# Patient Record
Sex: MALE | Race: WHITE | NOT HISPANIC OR LATINO | URBAN - METROPOLITAN AREA
[De-identification: names, ages, dates, MRNs, and addresses within clinical notes are randomized per-mention and may not be internally consistent; named-entity substitution may affect disease eponyms.]

---

## 2019-02-19 ENCOUNTER — OUTPATIENT (OUTPATIENT)
Dept: OUTPATIENT SERVICES | Facility: HOSPITAL | Age: 74
LOS: 1 days | End: 2019-02-19
Payer: MEDICARE

## 2019-02-19 VITALS
HEIGHT: 67 IN | SYSTOLIC BLOOD PRESSURE: 110 MMHG | WEIGHT: 223.11 LBS | HEART RATE: 54 BPM | OXYGEN SATURATION: 99 % | TEMPERATURE: 98 F | RESPIRATION RATE: 14 BRPM | DIASTOLIC BLOOD PRESSURE: 67 MMHG

## 2019-02-19 DIAGNOSIS — Z98.61 CORONARY ANGIOPLASTY STATUS: Chronic | ICD-10-CM

## 2019-02-19 DIAGNOSIS — Z01.818 ENCOUNTER FOR OTHER PREPROCEDURAL EXAMINATION: ICD-10-CM

## 2019-02-19 DIAGNOSIS — M17.5 OTHER UNILATERAL SECONDARY OSTEOARTHRITIS OF KNEE: ICD-10-CM

## 2019-02-19 DIAGNOSIS — M17.12 UNILATERAL PRIMARY OSTEOARTHRITIS, LEFT KNEE: ICD-10-CM

## 2019-02-19 DIAGNOSIS — Z98.890 OTHER SPECIFIED POSTPROCEDURAL STATES: Chronic | ICD-10-CM

## 2019-02-19 LAB
ALBUMIN SERPL ELPH-MCNC: 3.9 G/DL — SIGNIFICANT CHANGE UP (ref 3.3–5)
ALP SERPL-CCNC: 75 U/L — SIGNIFICANT CHANGE UP (ref 30–120)
ALT FLD-CCNC: 28 U/L DA — SIGNIFICANT CHANGE UP (ref 10–60)
ANION GAP SERPL CALC-SCNC: 5 MMOL/L — SIGNIFICANT CHANGE UP (ref 5–17)
APTT BLD: 33.5 SEC — SIGNIFICANT CHANGE UP (ref 28.5–37)
AST SERPL-CCNC: 23 U/L — SIGNIFICANT CHANGE UP (ref 10–40)
BILIRUB SERPL-MCNC: 0.5 MG/DL — SIGNIFICANT CHANGE UP (ref 0.2–1.2)
BLD GP AB SCN SERPL QL: SIGNIFICANT CHANGE UP
BUN SERPL-MCNC: 19 MG/DL — SIGNIFICANT CHANGE UP (ref 7–23)
CALCIUM SERPL-MCNC: 8.9 MG/DL — SIGNIFICANT CHANGE UP (ref 8.4–10.5)
CHLORIDE SERPL-SCNC: 105 MMOL/L — SIGNIFICANT CHANGE UP (ref 96–108)
CO2 SERPL-SCNC: 34 MMOL/L — HIGH (ref 22–31)
CREAT SERPL-MCNC: 1.06 MG/DL — SIGNIFICANT CHANGE UP (ref 0.5–1.3)
GLUCOSE SERPL-MCNC: 130 MG/DL — HIGH (ref 70–99)
HCT VFR BLD CALC: 44.2 % — SIGNIFICANT CHANGE UP (ref 39–50)
HGB BLD-MCNC: 14.5 G/DL — SIGNIFICANT CHANGE UP (ref 13–17)
INR BLD: 1.02 RATIO — SIGNIFICANT CHANGE UP (ref 0.88–1.16)
MCHC RBC-ENTMCNC: 30 PG — SIGNIFICANT CHANGE UP (ref 27–34)
MCHC RBC-ENTMCNC: 32.8 GM/DL — SIGNIFICANT CHANGE UP (ref 32–36)
MCV RBC AUTO: 91.5 FL — SIGNIFICANT CHANGE UP (ref 80–100)
NRBC # BLD: 0 /100 WBCS — SIGNIFICANT CHANGE UP (ref 0–0)
PLATELET # BLD AUTO: 154 K/UL — SIGNIFICANT CHANGE UP (ref 150–400)
POTASSIUM SERPL-MCNC: 4.1 MMOL/L — SIGNIFICANT CHANGE UP (ref 3.5–5.3)
POTASSIUM SERPL-SCNC: 4.1 MMOL/L — SIGNIFICANT CHANGE UP (ref 3.5–5.3)
PROT SERPL-MCNC: 6.9 G/DL — SIGNIFICANT CHANGE UP (ref 6–8.3)
PROTHROM AB SERPL-ACNC: 11.1 SEC — SIGNIFICANT CHANGE UP (ref 10–12.9)
RBC # BLD: 4.83 M/UL — SIGNIFICANT CHANGE UP (ref 4.2–5.8)
RBC # FLD: 13.4 % — SIGNIFICANT CHANGE UP (ref 10.3–14.5)
SODIUM SERPL-SCNC: 144 MMOL/L — SIGNIFICANT CHANGE UP (ref 135–145)
WBC # BLD: 5.34 K/UL — SIGNIFICANT CHANGE UP (ref 3.8–10.5)
WBC # FLD AUTO: 5.34 K/UL — SIGNIFICANT CHANGE UP (ref 3.8–10.5)

## 2019-02-19 PROCEDURE — 87640 STAPH A DNA AMP PROBE: CPT

## 2019-02-19 PROCEDURE — 85610 PROTHROMBIN TIME: CPT

## 2019-02-19 PROCEDURE — 85027 COMPLETE CBC AUTOMATED: CPT

## 2019-02-19 PROCEDURE — 86900 BLOOD TYPING SEROLOGIC ABO: CPT

## 2019-02-19 PROCEDURE — 86850 RBC ANTIBODY SCREEN: CPT

## 2019-02-19 PROCEDURE — 93005 ELECTROCARDIOGRAM TRACING: CPT

## 2019-02-19 PROCEDURE — 36415 COLL VENOUS BLD VENIPUNCTURE: CPT

## 2019-02-19 PROCEDURE — 80053 COMPREHEN METABOLIC PANEL: CPT

## 2019-02-19 PROCEDURE — 87641 MR-STAPH DNA AMP PROBE: CPT

## 2019-02-19 PROCEDURE — G0463: CPT

## 2019-02-19 PROCEDURE — 85730 THROMBOPLASTIN TIME PARTIAL: CPT

## 2019-02-19 PROCEDURE — 93010 ELECTROCARDIOGRAM REPORT: CPT

## 2019-02-19 PROCEDURE — 86901 BLOOD TYPING SEROLOGIC RH(D): CPT

## 2019-02-19 RX ORDER — SILODOSIN 4 MG/1
1 CAPSULE ORAL
Qty: 0 | Refills: 0 | COMMUNITY

## 2019-02-19 RX ORDER — MEMANTINE HYDROCHLORIDE 10 MG/1
0 TABLET ORAL
Qty: 0 | Refills: 0 | COMMUNITY

## 2019-02-19 NOTE — H&P PST ADULT - PROBLEM SELECTOR PLAN 1
Left knee replacement   Medical and cardiac clearance   Plavix and aspirin instruction from cardiologist  Pre op instruction   Respiratory and pharmacy consult

## 2019-02-19 NOTE — H&P PST ADULT - PMH
BPH (benign prostatic hyperplasia)    CAD (coronary artery disease)    Hypothyroid    Memory loss    Myocardial infarction  2002,2006  JILLIAN on CPAP    Other secondary osteoarthritis of left knee BPH (benign prostatic hyperplasia)    CAD (coronary artery disease)    Essential hypertension    HLD (hyperlipidemia)    Hypothyroid    Memory loss    Myocardial infarction  2002,2006  JILLIAN on CPAP    Other secondary osteoarthritis of left knee

## 2019-02-19 NOTE — H&P PST ADULT - HISTORY OF PRESENT ILLNESS
Kyra is a 72 y/o male who presents with 2 year history of left knee pain . Reports pain with movement and bending knee . Takes Tylenol PRN for pain . scheduled for left knee replacement on 5/4/19 Kyra is a 74 y/o male who presents with 2 year history of left knee pain . Reports pain with movement and bending knee . Takes Tylenol PRN for pain . scheduled for left knee replacement on 3/4/19

## 2019-02-19 NOTE — H&P PST ADULT - PSH
Coronary angioplasty status  PCI with stents 2002 LAD,  2006 x 1  S/P arthroscopy of left knee  2014

## 2019-02-20 LAB
MRSA PCR RESULT.: SIGNIFICANT CHANGE UP
S AUREUS DNA NOSE QL NAA+PROBE: SIGNIFICANT CHANGE UP

## 2019-03-01 RX ORDER — ONDANSETRON 8 MG/1
4 TABLET, FILM COATED ORAL EVERY 6 HOURS
Qty: 0 | Refills: 0 | Status: DISCONTINUED | OUTPATIENT
Start: 2019-03-04 | End: 2019-03-06

## 2019-03-01 RX ORDER — SENNA PLUS 8.6 MG/1
2 TABLET ORAL AT BEDTIME
Qty: 0 | Refills: 0 | Status: DISCONTINUED | OUTPATIENT
Start: 2019-03-04 | End: 2019-03-06

## 2019-03-01 RX ORDER — DOCUSATE SODIUM 100 MG
100 CAPSULE ORAL THREE TIMES A DAY
Qty: 0 | Refills: 0 | Status: DISCONTINUED | OUTPATIENT
Start: 2019-03-04 | End: 2019-03-06

## 2019-03-01 RX ORDER — PANTOPRAZOLE SODIUM 20 MG/1
40 TABLET, DELAYED RELEASE ORAL
Qty: 0 | Refills: 0 | Status: DISCONTINUED | OUTPATIENT
Start: 2019-03-04 | End: 2019-03-06

## 2019-03-01 RX ORDER — POLYETHYLENE GLYCOL 3350 17 G/17G
17 POWDER, FOR SOLUTION ORAL DAILY
Qty: 0 | Refills: 0 | Status: DISCONTINUED | OUTPATIENT
Start: 2019-03-04 | End: 2019-03-06

## 2019-03-01 RX ORDER — SODIUM CHLORIDE 9 MG/ML
1000 INJECTION, SOLUTION INTRAVENOUS
Qty: 0 | Refills: 0 | Status: DISCONTINUED | OUTPATIENT
Start: 2019-03-04 | End: 2019-03-06

## 2019-03-01 RX ORDER — MAGNESIUM HYDROXIDE 400 MG/1
30 TABLET, CHEWABLE ORAL DAILY
Qty: 0 | Refills: 0 | Status: DISCONTINUED | OUTPATIENT
Start: 2019-03-04 | End: 2019-03-06

## 2019-03-01 NOTE — PHARMACOTHERAPY INTERVENTION NOTE - COMMENTS
Presurgical evaluation for postoperative medication management. Team emailed. Note placed in [place].

## 2019-03-01 NOTE — PROGRESS NOTE ADULT - ASSESSMENT
Presurgical evaluation:  1.	Topical TXA  2.	Acetaminophen for pain management. History of MI x 2 – add Celecoxib only if necessary for augmented pain management  3.	Therapeutic interchange for Tolterodine ER 4mg Qday is Oxybutynin 10mg BID (enter Tolterodine ER 4mg and page down to Therapeutic Interchange). No therapeutic interchange for Silodosin.  4.	VTE prophylaxis (Caprini 8): ASA EC 81mg q12h x 6 weeks, then resume ASA 81mg Qday. Resume Clopidogrel 75mg Qday POD1.

## 2019-03-03 ENCOUNTER — INPATIENT (INPATIENT)
Facility: HOSPITAL | Age: 74
LOS: 2 days | Discharge: NURSING, REHAB OR LONG-TERM CARE | DRG: 470 | End: 2019-03-06
Attending: ORTHOPAEDIC SURGERY | Admitting: ORTHOPAEDIC SURGERY
Payer: MEDICARE

## 2019-03-03 VITALS
OXYGEN SATURATION: 95 % | SYSTOLIC BLOOD PRESSURE: 148 MMHG | TEMPERATURE: 98 F | DIASTOLIC BLOOD PRESSURE: 80 MMHG | RESPIRATION RATE: 14 BRPM | HEART RATE: 55 BPM

## 2019-03-03 DIAGNOSIS — Z98.890 OTHER SPECIFIED POSTPROCEDURAL STATES: Chronic | ICD-10-CM

## 2019-03-03 DIAGNOSIS — Z98.61 CORONARY ANGIOPLASTY STATUS: Chronic | ICD-10-CM

## 2019-03-03 DIAGNOSIS — M17.12 UNILATERAL PRIMARY OSTEOARTHRITIS, LEFT KNEE: ICD-10-CM

## 2019-03-03 RX ORDER — LEVOTHYROXINE SODIUM 125 MCG
25 TABLET ORAL DAILY
Qty: 0 | Refills: 0 | Status: DISCONTINUED | OUTPATIENT
Start: 2019-03-03 | End: 2019-03-04

## 2019-03-03 RX ORDER — FAMOTIDINE 10 MG/ML
20 INJECTION INTRAVENOUS
Qty: 0 | Refills: 0 | Status: DISCONTINUED | OUTPATIENT
Start: 2019-03-03 | End: 2019-03-04

## 2019-03-03 RX ADMIN — FAMOTIDINE 20 MILLIGRAM(S): 10 INJECTION INTRAVENOUS at 21:40

## 2019-03-03 NOTE — CHART NOTE - NSCHARTNOTEFT_GEN_A_CORE
Patient admitted for pending surgery tomorrow.  Saw patient and patient with no acute complaints and is doing well.  Vital signs stable.  Will be followed by Dr. Schaefer.

## 2019-03-03 NOTE — PROVIDER CONTACT NOTE (OTHER) - BACKGROUND
Pre-op pt pending left total knee replacement tomorrow, DOS 3/4.  Pt states hx of JILLIAN; CPAP compliant, with setting of 4cm water pressure.  Pulmonary consult requested.

## 2019-03-03 NOTE — PROVIDER CONTACT NOTE (OTHER) - ACTION/TREATMENT ORDERED:
Discussed with Dr. Alexandra.  Telephone order taken for remote tele and CPAP machine.  Respiratory made aware.

## 2019-03-04 DIAGNOSIS — I25.10 ATHEROSCLEROTIC HEART DISEASE OF NATIVE CORONARY ARTERY WITHOUT ANGINA PECTORIS: ICD-10-CM

## 2019-03-04 DIAGNOSIS — M17.12 UNILATERAL PRIMARY OSTEOARTHRITIS, LEFT KNEE: ICD-10-CM

## 2019-03-04 DIAGNOSIS — E03.9 HYPOTHYROIDISM, UNSPECIFIED: ICD-10-CM

## 2019-03-04 DIAGNOSIS — I10 ESSENTIAL (PRIMARY) HYPERTENSION: ICD-10-CM

## 2019-03-04 LAB
ANION GAP SERPL CALC-SCNC: 7 MMOL/L — SIGNIFICANT CHANGE UP (ref 5–17)
BUN SERPL-MCNC: 18 MG/DL — SIGNIFICANT CHANGE UP (ref 7–23)
CALCIUM SERPL-MCNC: 8.6 MG/DL — SIGNIFICANT CHANGE UP (ref 8.4–10.5)
CHLORIDE SERPL-SCNC: 101 MMOL/L — SIGNIFICANT CHANGE UP (ref 96–108)
CO2 SERPL-SCNC: 26 MMOL/L — SIGNIFICANT CHANGE UP (ref 22–31)
CREAT SERPL-MCNC: 1.16 MG/DL — SIGNIFICANT CHANGE UP (ref 0.5–1.3)
GLUCOSE SERPL-MCNC: 193 MG/DL — HIGH (ref 70–99)
HCT VFR BLD CALC: 39.9 % — SIGNIFICANT CHANGE UP (ref 39–50)
HGB BLD-MCNC: 13.5 G/DL — SIGNIFICANT CHANGE UP (ref 13–17)
MCHC RBC-ENTMCNC: 29.9 PG — SIGNIFICANT CHANGE UP (ref 27–34)
MCHC RBC-ENTMCNC: 33.8 GM/DL — SIGNIFICANT CHANGE UP (ref 32–36)
MCV RBC AUTO: 88.5 FL — SIGNIFICANT CHANGE UP (ref 80–100)
NRBC # BLD: 0 /100 WBCS — SIGNIFICANT CHANGE UP (ref 0–0)
PLATELET # BLD AUTO: 134 K/UL — LOW (ref 150–400)
POTASSIUM SERPL-MCNC: 4.1 MMOL/L — SIGNIFICANT CHANGE UP (ref 3.5–5.3)
POTASSIUM SERPL-SCNC: 4.1 MMOL/L — SIGNIFICANT CHANGE UP (ref 3.5–5.3)
RBC # BLD: 4.51 M/UL — SIGNIFICANT CHANGE UP (ref 4.2–5.8)
RBC # FLD: 12.9 % — SIGNIFICANT CHANGE UP (ref 10.3–14.5)
SODIUM SERPL-SCNC: 134 MMOL/L — LOW (ref 135–145)
WBC # BLD: 10.47 K/UL — SIGNIFICANT CHANGE UP (ref 3.8–10.5)
WBC # FLD AUTO: 10.47 K/UL — SIGNIFICANT CHANGE UP (ref 3.8–10.5)

## 2019-03-04 PROCEDURE — 88305 TISSUE EXAM BY PATHOLOGIST: CPT | Mod: 26

## 2019-03-04 PROCEDURE — 88311 DECALCIFY TISSUE: CPT | Mod: 26

## 2019-03-04 PROCEDURE — 73562 X-RAY EXAM OF KNEE 3: CPT | Mod: 26,LT

## 2019-03-04 PROCEDURE — 27447 TOTAL KNEE ARTHROPLASTY: CPT | Mod: AS,LT

## 2019-03-04 RX ORDER — OXYCODONE HYDROCHLORIDE 5 MG/1
5 TABLET ORAL
Qty: 0 | Refills: 0 | Status: DISCONTINUED | OUTPATIENT
Start: 2019-03-04 | End: 2019-03-06

## 2019-03-04 RX ORDER — TAMSULOSIN HYDROCHLORIDE 0.4 MG/1
0.4 CAPSULE ORAL AT BEDTIME
Qty: 0 | Refills: 0 | Status: DISCONTINUED | OUTPATIENT
Start: 2019-03-04 | End: 2019-03-06

## 2019-03-04 RX ORDER — OXYCODONE HYDROCHLORIDE 5 MG/1
10 TABLET ORAL
Qty: 0 | Refills: 0 | Status: DISCONTINUED | OUTPATIENT
Start: 2019-03-04 | End: 2019-03-06

## 2019-03-04 RX ORDER — LEVOTHYROXINE SODIUM 125 MCG
75 TABLET ORAL DAILY
Qty: 0 | Refills: 0 | Status: DISCONTINUED | OUTPATIENT
Start: 2019-03-04 | End: 2019-03-06

## 2019-03-04 RX ORDER — APREPITANT 80 MG/1
40 CAPSULE ORAL ONCE
Qty: 0 | Refills: 0 | Status: COMPLETED | OUTPATIENT
Start: 2019-03-04 | End: 2019-03-04

## 2019-03-04 RX ORDER — ACETAMINOPHEN 500 MG
1000 TABLET ORAL EVERY 6 HOURS
Qty: 0 | Refills: 0 | Status: COMPLETED | OUTPATIENT
Start: 2019-03-04 | End: 2019-03-05

## 2019-03-04 RX ORDER — TRANEXAMIC ACID 100 MG/ML
1 INJECTION, SOLUTION INTRAVENOUS ONCE
Qty: 0 | Refills: 0 | Status: DISCONTINUED | OUTPATIENT
Start: 2019-03-04 | End: 2019-03-04

## 2019-03-04 RX ORDER — ATENOLOL 25 MG/1
25 TABLET ORAL DAILY
Qty: 0 | Refills: 0 | Status: DISCONTINUED | OUTPATIENT
Start: 2019-03-04 | End: 2019-03-06

## 2019-03-04 RX ORDER — LEVOTHYROXINE SODIUM 125 MCG
50 TABLET ORAL DAILY
Qty: 0 | Refills: 0 | Status: DISCONTINUED | OUTPATIENT
Start: 2019-03-04 | End: 2019-03-04

## 2019-03-04 RX ORDER — VANCOMYCIN HCL 1 G
1500 VIAL (EA) INTRAVENOUS ONCE
Qty: 0 | Refills: 0 | Status: COMPLETED | OUTPATIENT
Start: 2019-03-04 | End: 2019-03-04

## 2019-03-04 RX ORDER — OXYBUTYNIN CHLORIDE 5 MG
10 TABLET ORAL
Qty: 0 | Refills: 0 | Status: DISCONTINUED | OUTPATIENT
Start: 2019-03-04 | End: 2019-03-06

## 2019-03-04 RX ORDER — ACETAMINOPHEN 500 MG
1000 TABLET ORAL ONCE
Qty: 0 | Refills: 0 | Status: COMPLETED | OUTPATIENT
Start: 2019-03-04 | End: 2019-03-04

## 2019-03-04 RX ORDER — ASPIRIN/CALCIUM CARB/MAGNESIUM 324 MG
81 TABLET ORAL
Qty: 0 | Refills: 0 | Status: DISCONTINUED | OUTPATIENT
Start: 2019-03-04 | End: 2019-03-06

## 2019-03-04 RX ORDER — CLOPIDOGREL BISULFATE 75 MG/1
75 TABLET, FILM COATED ORAL DAILY
Qty: 0 | Refills: 0 | Status: DISCONTINUED | OUTPATIENT
Start: 2019-03-05 | End: 2019-03-06

## 2019-03-04 RX ORDER — HYDROMORPHONE HYDROCHLORIDE 2 MG/ML
0.5 INJECTION INTRAMUSCULAR; INTRAVENOUS; SUBCUTANEOUS
Qty: 0 | Refills: 0 | Status: DISCONTINUED | OUTPATIENT
Start: 2019-03-04 | End: 2019-03-04

## 2019-03-04 RX ORDER — CHLORHEXIDINE GLUCONATE 213 G/1000ML
1 SOLUTION TOPICAL ONCE
Qty: 0 | Refills: 0 | Status: COMPLETED | OUTPATIENT
Start: 2019-03-04 | End: 2019-03-04

## 2019-03-04 RX ORDER — LEVOTHYROXINE SODIUM 125 MCG
75 TABLET ORAL DAILY
Qty: 0 | Refills: 0 | Status: DISCONTINUED | OUTPATIENT
Start: 2019-03-04 | End: 2019-03-04

## 2019-03-04 RX ORDER — ATORVASTATIN CALCIUM 80 MG/1
80 TABLET, FILM COATED ORAL AT BEDTIME
Qty: 0 | Refills: 0 | Status: DISCONTINUED | OUTPATIENT
Start: 2019-03-04 | End: 2019-03-06

## 2019-03-04 RX ORDER — DONEPEZIL HYDROCHLORIDE 10 MG/1
10 TABLET, FILM COATED ORAL AT BEDTIME
Qty: 0 | Refills: 0 | Status: DISCONTINUED | OUTPATIENT
Start: 2019-03-04 | End: 2019-03-06

## 2019-03-04 RX ORDER — HYDROMORPHONE HYDROCHLORIDE 2 MG/ML
0.5 INJECTION INTRAMUSCULAR; INTRAVENOUS; SUBCUTANEOUS
Qty: 0 | Refills: 0 | Status: DISCONTINUED | OUTPATIENT
Start: 2019-03-04 | End: 2019-03-06

## 2019-03-04 RX ORDER — SODIUM CHLORIDE 9 MG/ML
1000 INJECTION, SOLUTION INTRAVENOUS
Qty: 0 | Refills: 0 | Status: DISCONTINUED | OUTPATIENT
Start: 2019-03-04 | End: 2019-03-04

## 2019-03-04 RX ORDER — MEMANTINE HYDROCHLORIDE 10 MG/1
10 TABLET ORAL
Qty: 0 | Refills: 0 | Status: DISCONTINUED | OUTPATIENT
Start: 2019-03-04 | End: 2019-03-06

## 2019-03-04 RX ORDER — ACETAMINOPHEN 500 MG
1000 TABLET ORAL EVERY 8 HOURS
Qty: 0 | Refills: 0 | Status: DISCONTINUED | OUTPATIENT
Start: 2019-03-05 | End: 2019-03-06

## 2019-03-04 RX ADMIN — Medication 300 MILLIGRAM(S): at 06:52

## 2019-03-04 RX ADMIN — APREPITANT 40 MILLIGRAM(S): 80 CAPSULE ORAL at 06:53

## 2019-03-04 RX ADMIN — Medication 10 MILLIGRAM(S): at 17:35

## 2019-03-04 RX ADMIN — Medication 1000 MILLIGRAM(S): at 21:28

## 2019-03-04 RX ADMIN — TAMSULOSIN HYDROCHLORIDE 0.4 MILLIGRAM(S): 0.4 CAPSULE ORAL at 21:27

## 2019-03-04 RX ADMIN — MEMANTINE HYDROCHLORIDE 10 MILLIGRAM(S): 10 TABLET ORAL at 17:35

## 2019-03-04 RX ADMIN — Medication 25 MICROGRAM(S): at 05:02

## 2019-03-04 RX ADMIN — FAMOTIDINE 20 MILLIGRAM(S): 10 INJECTION INTRAVENOUS at 05:02

## 2019-03-04 RX ADMIN — Medication 50 MICROGRAM(S): at 05:17

## 2019-03-04 RX ADMIN — Medication 1000 MILLIGRAM(S): at 16:15

## 2019-03-04 RX ADMIN — SODIUM CHLORIDE 125 MILLILITER(S): 9 INJECTION, SOLUTION INTRAVENOUS at 21:26

## 2019-03-04 RX ADMIN — Medication 300 MILLIGRAM(S): at 19:47

## 2019-03-04 RX ADMIN — CHLORHEXIDINE GLUCONATE 1 APPLICATION(S): 213 SOLUTION TOPICAL at 06:53

## 2019-03-04 RX ADMIN — ATORVASTATIN CALCIUM 80 MILLIGRAM(S): 80 TABLET, FILM COATED ORAL at 21:27

## 2019-03-04 RX ADMIN — DONEPEZIL HYDROCHLORIDE 10 MILLIGRAM(S): 10 TABLET, FILM COATED ORAL at 21:28

## 2019-03-04 RX ADMIN — Medication 81 MILLIGRAM(S): at 17:35

## 2019-03-04 RX ADMIN — Medication 400 MILLIGRAM(S): at 21:25

## 2019-03-04 RX ADMIN — Medication 400 MILLIGRAM(S): at 15:31

## 2019-03-04 RX ADMIN — Medication 100 MILLIGRAM(S): at 21:27

## 2019-03-04 RX ADMIN — SENNA PLUS 2 TABLET(S): 8.6 TABLET ORAL at 21:26

## 2019-03-04 NOTE — DISCHARGE NOTE ADULT - MEDICATION SUMMARY - MEDICATIONS TO CHANGE
I will SWITCH the dose or number of times a day I take the medications listed below when I get home from the hospital:    aspirin 81 mg oral tablet  -- 2  by mouth once a day

## 2019-03-04 NOTE — PHYSICAL THERAPY INITIAL EVALUATION ADULT - RANGE OF MOTION EXAMINATION, REHAB EVAL
deficits as listed below/left knee flex ~60 degrees/bilateral upper extremity ROM was WFL (within functional limits)/Right LE ROM was WFL (within functional limits)

## 2019-03-04 NOTE — DISCHARGE NOTE ADULT - HOSPITAL COURSE
The patient is a 73 y.o.  male with severe osteoarthritis of the left knee.  he was seen in the PST department at [facility] nd obtained the appropriate medical clearances  He was admitted on 3/3/19 (pt boarded overnight due to inclement weather) On 3/4/19, he received pre-operative parenteral prophylactic antibiotics (vancomycin), and was brought to the operating room and underwent an  uncomplicated left TKA by orthopedic surgeon Dr. Eric Colunga.    A medical consultation from the Hospitalist service was obtained for post-operative medical co-management. Typical Physical & occupational therapy modalities post TKA were performed including ambulation training, range of motion, ADL's, and transfers. Ecotrin 81 mg every 12 hrs, along with bilateral venodynes, was given for DVT prophylaxis (caprini 8)  The patient had a clean appearing surgical incision with no sign of surgical site infections and had a stable neuro / vascular exam of the operated extremity.  After progression of mobility guided by the PT/ OT staff,  the patient was felt to benefit from further rehabilitative care for restoration to level of function. This was felt to best be accomplished in a rehab facility.  Discharge and Orthopedic Care instructions were delineated in the Discharge Plan and reviewed with the patient. All medications were delineated in the medication reconciliation tool and key points were reviewed with the patient. They were deemed stable from an Orthopedic & medical standpoint for discharge today.  Upon  discharge from the rehab facility they will be  following up with Dr. Colunga for office  follow up Orthopedic care.

## 2019-03-04 NOTE — DISCHARGE NOTE ADULT - INSTRUCTIONS
regular  For Constipation :   • Increase your water intake. Drink at least 8 glasses of water daily.  • Try adding fiber to your diet by eating fruits, vegetables and foods that are rich in grains.  • If you do experience constipation, you may take an over-the-counter stool softener/laxative such as Nathaly Colace, Senekot, miralax or  Milk of Magnesia.

## 2019-03-04 NOTE — BRIEF OPERATIVE NOTE - PROCEDURE
<<-----Click on this checkbox to enter Procedure Left total knee arthroplasty  03/04/2019    Active  LOGATA

## 2019-03-04 NOTE — DISCHARGE NOTE ADULT - MEDICATION SUMMARY - MEDICATIONS TO TAKE
I will START or STAY ON the medications listed below when I get home from the hospital:    acetaminophen 500 mg oral tablet  -- 2 tab(s) by mouth every 12 hours  -- Indication: For Pain    oxyCODONE 5 mg oral tablet  -- 1 tab(s) by mouth every 3 hours, As needed, Mild Pain (1 - 3)  -- Indication: For Pain    oxyCODONE 10 mg oral tablet  -- 1 tab(s) by mouth every 3 hours, As needed, Moderate Pain (4 - 6)  -- Indication: For Pain    aspirin 81 mg oral delayed release tablet  -- 1 tab(s) by mouth 2 times a day x 40 more days  -- Indication: For DVT prophylaxis    tamsulosin 0.4 mg oral capsule  -- 1 cap(s) by mouth once a day (at bedtime)  -- Indication: For BPH    Rapaflo 8 mg oral capsule  -- 1 cap(s) by mouth once a day (at bedtime)  -- Indication: For BPH    Crestor 20 mg oral tablet  -- 1 tab(s) by mouth once a day (at bedtime)  -- Indication: For Hyperlipidimia    Plavix 75 mg oral tablet  -- 1 tab(s) by mouth once a day  -- Indication: For CAD (coronary artery disease)    atenolol 25 mg oral tablet  -- 1 tab(s) by mouth once a day  -- Indication: For HTN    Aricept 10 mg oral tablet  -- 1 tab(s) by mouth once a day (at bedtime)  -- Indication: For dementia    docusate sodium 100 mg oral capsule  -- 1 cap(s) by mouth 3 times a day  -- Indication: For Constipation    senna oral tablet  -- 2 tab(s) by mouth once a day (at bedtime)  -- Indication: For Constipation    memantine 10 mg oral tablet  -- orally 2 times a day  -- Indication: For dementia    Fish Oil oral capsule  -- 1 tab(s) by mouth once a day  -- Indication: For supplem    pantoprazole 40 mg oral delayed release tablet  -- 1 tab(s) by mouth once a day (before a meal)  -- Indication: For Acid reflux    levothyroxine 75 mcg (0.075 mg) oral tablet  -- 1 tab(s) by mouth once a day  -- Indication: For Hypothyrodism    Detrol  -- 4 milligram(s) by mouth once a day  -- Indication: For Over active bladder    Multi Vitamin+  -- 1 tab(s) orally  -- Indication: For vitamin    Vitamin B12 50 mcg oral tablet  -- 1 tab(s) by mouth once a day  -- Indication: For vitamin

## 2019-03-04 NOTE — DISCHARGE NOTE ADULT - CARE PROVIDER_API CALL
Eric Colunga)  Orthopaedic Surgery  [address] [place] [address]  Phone: [phone]  Fax: [phone]  Follow Up Time:

## 2019-03-04 NOTE — DISCHARGE NOTE ADULT - MEDICATION SUMMARY - MEDICATIONS TO STOP TAKING
I will STOP taking the medications listed below when I get home from the hospital:    Ecotrin  -- 81 milligram(s) orally

## 2019-03-04 NOTE — CONSULT NOTE ADULT - ATTENDING COMMENTS
Patient seen and examined.   Full consult dictated.   Will follow.   Pulmonary consult called for JILLIAN.

## 2019-03-04 NOTE — DISCHARGE NOTE ADULT - PATIENT PORTAL LINK FT
You can access the [facility], offered by [facility], by registering with the following website: [email or web address]

## 2019-03-04 NOTE — OCCUPATIONAL THERAPY INITIAL EVALUATION ADULT - ADL RETRAINING, OT EVAL
Patient will dress lower body with set-up , AE as needed within 2-3 sessions.
Patient will dress lower body with set-up , AE as needed within 2-3 sessions.

## 2019-03-04 NOTE — DISCHARGE NOTE ADULT - PLAN OF CARE
improve ambulation, range of motion, quality of life, and decrease pain Physical Therapy/Occupational Therapy for ambulation, transfers, stairs, ADL's, Range of Motion Exercises, Isometrics.  Full weight bearing as tolerated with rolling walker  Range of Motion Goals: Flexion 120 degrees; Extension 0 degrees  Keep incision clean and dry.  Staple removal 14 days after surgery at rehab facility or Surgeon's office  May shower post-op day #5 if no drainage from incision - Call your doctor if you experience:  • An increase in pain not controlled by pain medication or change in activity or  position.  • Temperature greater than 101° F.  • Redness, increased swelling or foul smelling drainage from or around the  incision.  • Numbness, tingling or a change in color or temperature of the operative leg.  • Call your doctor immediately if you experience chest pain, shortness of breath or calf pain.

## 2019-03-04 NOTE — DISCHARGE NOTE ADULT - CARE PLAN
Principal Discharge DX:	Primary osteoarthritis of left knee  Goal:	improve ambulation, range of motion, quality of life, and decrease pain  Assessment and plan of treatment:	Physical Therapy/Occupational Therapy for ambulation, transfers, stairs, ADL's, Range of Motion Exercises, Isometrics.  Full weight bearing as tolerated with rolling walker  Range of Motion Goals: Flexion 120 degrees; Extension 0 degrees  Keep incision clean and dry.  Staple removal 14 days after surgery at rehab facility or Surgeon's office  May shower post-op day #5 if no drainage from incision  Assessment and plan of treatment:	- Call your doctor if you experience:  • An increase in pain not controlled by pain medication or change in activity or  position.  • Temperature greater than 101° F.  • Redness, increased swelling or foul smelling drainage from or around the  incision.  • Numbness, tingling or a change in color or temperature of the operative leg.  • Call your doctor immediately if you experience chest pain, shortness of breath or calf pain.

## 2019-03-05 DIAGNOSIS — E03.9 HYPOTHYROIDISM, UNSPECIFIED: ICD-10-CM

## 2019-03-05 DIAGNOSIS — G47.33 OBSTRUCTIVE SLEEP APNEA (ADULT) (PEDIATRIC): ICD-10-CM

## 2019-03-05 DIAGNOSIS — Z29.9 ENCOUNTER FOR PROPHYLACTIC MEASURES, UNSPECIFIED: ICD-10-CM

## 2019-03-05 DIAGNOSIS — D50.0 IRON DEFICIENCY ANEMIA SECONDARY TO BLOOD LOSS (CHRONIC): ICD-10-CM

## 2019-03-05 DIAGNOSIS — N40.0 BENIGN PROSTATIC HYPERPLASIA WITHOUT LOWER URINARY TRACT SYMPTOMS: ICD-10-CM

## 2019-03-05 LAB
ANION GAP SERPL CALC-SCNC: 5 MMOL/L — SIGNIFICANT CHANGE UP (ref 5–17)
BUN SERPL-MCNC: 17 MG/DL — SIGNIFICANT CHANGE UP (ref 7–23)
CALCIUM SERPL-MCNC: 8.5 MG/DL — SIGNIFICANT CHANGE UP (ref 8.4–10.5)
CHLORIDE SERPL-SCNC: 107 MMOL/L — SIGNIFICANT CHANGE UP (ref 96–108)
CO2 SERPL-SCNC: 28 MMOL/L — SIGNIFICANT CHANGE UP (ref 22–31)
CREAT SERPL-MCNC: 1.03 MG/DL — SIGNIFICANT CHANGE UP (ref 0.5–1.3)
GLUCOSE SERPL-MCNC: 149 MG/DL — HIGH (ref 70–99)
HCT VFR BLD CALC: 33.4 % — LOW (ref 39–50)
HGB BLD-MCNC: 11.5 G/DL — LOW (ref 13–17)
MCHC RBC-ENTMCNC: 30.3 PG — SIGNIFICANT CHANGE UP (ref 27–34)
MCHC RBC-ENTMCNC: 34.4 GM/DL — SIGNIFICANT CHANGE UP (ref 32–36)
MCV RBC AUTO: 88.1 FL — SIGNIFICANT CHANGE UP (ref 80–100)
NRBC # BLD: 0 /100 WBCS — SIGNIFICANT CHANGE UP (ref 0–0)
PLATELET # BLD AUTO: 129 K/UL — LOW (ref 150–400)
POTASSIUM SERPL-MCNC: 4.4 MMOL/L — SIGNIFICANT CHANGE UP (ref 3.5–5.3)
POTASSIUM SERPL-SCNC: 4.4 MMOL/L — SIGNIFICANT CHANGE UP (ref 3.5–5.3)
RBC # BLD: 3.79 M/UL — LOW (ref 4.2–5.8)
RBC # FLD: 13 % — SIGNIFICANT CHANGE UP (ref 10.3–14.5)
SODIUM SERPL-SCNC: 140 MMOL/L — SIGNIFICANT CHANGE UP (ref 135–145)
WBC # BLD: 10.75 K/UL — HIGH (ref 3.8–10.5)
WBC # FLD AUTO: 10.75 K/UL — HIGH (ref 3.8–10.5)

## 2019-03-05 PROCEDURE — 12345: CPT | Mod: NC

## 2019-03-05 RX ADMIN — DONEPEZIL HYDROCHLORIDE 10 MILLIGRAM(S): 10 TABLET, FILM COATED ORAL at 21:52

## 2019-03-05 RX ADMIN — Medication 81 MILLIGRAM(S): at 17:38

## 2019-03-05 RX ADMIN — Medication 1000 MILLIGRAM(S): at 17:40

## 2019-03-05 RX ADMIN — MEMANTINE HYDROCHLORIDE 10 MILLIGRAM(S): 10 TABLET ORAL at 05:03

## 2019-03-05 RX ADMIN — Medication 1000 MILLIGRAM(S): at 09:56

## 2019-03-05 RX ADMIN — Medication 100 MILLIGRAM(S): at 05:03

## 2019-03-05 RX ADMIN — TAMSULOSIN HYDROCHLORIDE 0.4 MILLIGRAM(S): 0.4 CAPSULE ORAL at 21:51

## 2019-03-05 RX ADMIN — Medication 100 MILLIGRAM(S): at 21:52

## 2019-03-05 RX ADMIN — ATORVASTATIN CALCIUM 80 MILLIGRAM(S): 80 TABLET, FILM COATED ORAL at 21:52

## 2019-03-05 RX ADMIN — Medication 1000 MILLIGRAM(S): at 02:56

## 2019-03-05 RX ADMIN — Medication 10 MILLIGRAM(S): at 17:38

## 2019-03-05 RX ADMIN — SENNA PLUS 2 TABLET(S): 8.6 TABLET ORAL at 21:52

## 2019-03-05 RX ADMIN — PANTOPRAZOLE SODIUM 40 MILLIGRAM(S): 20 TABLET, DELAYED RELEASE ORAL at 05:03

## 2019-03-05 RX ADMIN — Medication 100 MILLIGRAM(S): at 14:18

## 2019-03-05 RX ADMIN — Medication 400 MILLIGRAM(S): at 02:54

## 2019-03-05 RX ADMIN — Medication 1000 MILLIGRAM(S): at 09:57

## 2019-03-05 RX ADMIN — CLOPIDOGREL BISULFATE 75 MILLIGRAM(S): 75 TABLET, FILM COATED ORAL at 14:18

## 2019-03-05 RX ADMIN — ATENOLOL 25 MILLIGRAM(S): 25 TABLET ORAL at 05:03

## 2019-03-05 RX ADMIN — Medication 75 MICROGRAM(S): at 05:02

## 2019-03-05 RX ADMIN — Medication 81 MILLIGRAM(S): at 05:03

## 2019-03-05 RX ADMIN — Medication 1000 MILLIGRAM(S): at 17:38

## 2019-03-05 RX ADMIN — MEMANTINE HYDROCHLORIDE 10 MILLIGRAM(S): 10 TABLET ORAL at 17:39

## 2019-03-05 RX ADMIN — Medication 10 MILLIGRAM(S): at 05:03

## 2019-03-05 NOTE — PROGRESS NOTE ADULT - ASSESSMENT
73-year-old gentleman with known history of coronary artery disease, hypertension, hyperlipidemia, benign prostatic hypertrophy, hypothyroidism, cognitive impairment, history of myocardial infarction in the past, and obstructive sleep apnea on CPAP who is status post left total knee arthroplasty secondary to worsening knee pain.  The patient is being seen at the request of Orthopedics for medical comanagement.

## 2019-03-05 NOTE — PROGRESS NOTE ADULT - PROBLEM SELECTOR PLAN 1
S/P Left TKA, post op day #1.   On ASA/Plavix for DVT prophylaxis.   On Tylenol and oxycodone as needed for pain.   Continue PT/CPM.   Encourage incentive spirometry.   Monitor labs.   For ISHAN in AM if stable as per patient/family request.

## 2019-03-05 NOTE — PROGRESS NOTE ADULT - PROBLEM SELECTOR PLAN 2
Continue patient on Atenolol with holding parameters.   Monitor blood pressure per routine. Patient with mild anemia of acute post op blood loss.   Patient is asymptomatic.   Monitor serial CBC.

## 2019-03-05 NOTE — PROGRESS NOTE ADULT - PROBLEM SELECTOR PROBLEM 3
Hypothyroidism, unspecified type Benign prostatic hyperplasia, unspecified whether lower urinary tract symptoms present

## 2019-03-05 NOTE — PROGRESS NOTE ADULT - PROBLEM SELECTOR PLAN 4
Continue CPAP at night.   Pulmonary follow up noted.  Monitor on remote tele. Continue patient on Atenolol with holding parameters.   Monitor blood pressure per routine.

## 2019-03-05 NOTE — PROGRESS NOTE ADULT - PROBLEM SELECTOR PLAN 6
ASA BID for DVT prophylaxis.   Protonix for GI prophylaxis. Continue CPAP at night.   Pulmonary follow up noted.  Monitor on remote tele.

## 2019-03-06 VITALS
HEART RATE: 65 BPM | DIASTOLIC BLOOD PRESSURE: 71 MMHG | RESPIRATION RATE: 16 BRPM | TEMPERATURE: 98 F | SYSTOLIC BLOOD PRESSURE: 131 MMHG | OXYGEN SATURATION: 90 %

## 2019-03-06 LAB
ANION GAP SERPL CALC-SCNC: 5 MMOL/L — SIGNIFICANT CHANGE UP (ref 5–17)
BUN SERPL-MCNC: 18 MG/DL — SIGNIFICANT CHANGE UP (ref 7–23)
CALCIUM SERPL-MCNC: 8.5 MG/DL — SIGNIFICANT CHANGE UP (ref 8.4–10.5)
CHLORIDE SERPL-SCNC: 107 MMOL/L — SIGNIFICANT CHANGE UP (ref 96–108)
CO2 SERPL-SCNC: 32 MMOL/L — HIGH (ref 22–31)
CREAT SERPL-MCNC: 1.07 MG/DL — SIGNIFICANT CHANGE UP (ref 0.5–1.3)
GLUCOSE SERPL-MCNC: 119 MG/DL — HIGH (ref 70–99)
HCT VFR BLD CALC: 34 % — LOW (ref 39–50)
HGB BLD-MCNC: 11.4 G/DL — LOW (ref 13–17)
MCHC RBC-ENTMCNC: 30.1 PG — SIGNIFICANT CHANGE UP (ref 27–34)
MCHC RBC-ENTMCNC: 33.5 GM/DL — SIGNIFICANT CHANGE UP (ref 32–36)
MCV RBC AUTO: 89.7 FL — SIGNIFICANT CHANGE UP (ref 80–100)
NRBC # BLD: 0 /100 WBCS — SIGNIFICANT CHANGE UP (ref 0–0)
PLATELET # BLD AUTO: 115 K/UL — LOW (ref 150–400)
POTASSIUM SERPL-MCNC: 4 MMOL/L — SIGNIFICANT CHANGE UP (ref 3.5–5.3)
POTASSIUM SERPL-SCNC: 4 MMOL/L — SIGNIFICANT CHANGE UP (ref 3.5–5.3)
RBC # BLD: 3.79 M/UL — LOW (ref 4.2–5.8)
RBC # FLD: 13.3 % — SIGNIFICANT CHANGE UP (ref 10.3–14.5)
SODIUM SERPL-SCNC: 144 MMOL/L — SIGNIFICANT CHANGE UP (ref 135–145)
WBC # BLD: 8.68 K/UL — SIGNIFICANT CHANGE UP (ref 3.8–10.5)
WBC # FLD AUTO: 8.68 K/UL — SIGNIFICANT CHANGE UP (ref 3.8–10.5)

## 2019-03-06 PROCEDURE — 97116 GAIT TRAINING THERAPY: CPT

## 2019-03-06 PROCEDURE — C1713: CPT

## 2019-03-06 PROCEDURE — 80048 BASIC METABOLIC PNL TOTAL CA: CPT

## 2019-03-06 PROCEDURE — C1776: CPT

## 2019-03-06 PROCEDURE — 88311 DECALCIFY TISSUE: CPT

## 2019-03-06 PROCEDURE — 97535 SELF CARE MNGMENT TRAINING: CPT

## 2019-03-06 PROCEDURE — 97165 OT EVAL LOW COMPLEX 30 MIN: CPT

## 2019-03-06 PROCEDURE — 97110 THERAPEUTIC EXERCISES: CPT

## 2019-03-06 PROCEDURE — 97161 PT EVAL LOW COMPLEX 20 MIN: CPT

## 2019-03-06 PROCEDURE — 85027 COMPLETE CBC AUTOMATED: CPT

## 2019-03-06 PROCEDURE — 36415 COLL VENOUS BLD VENIPUNCTURE: CPT

## 2019-03-06 PROCEDURE — 94660 CPAP INITIATION&MGMT: CPT

## 2019-03-06 PROCEDURE — 88305 TISSUE EXAM BY PATHOLOGIST: CPT

## 2019-03-06 PROCEDURE — 73562 X-RAY EXAM OF KNEE 3: CPT

## 2019-03-06 PROCEDURE — 97530 THERAPEUTIC ACTIVITIES: CPT

## 2019-03-06 RX ORDER — OXYCODONE HYDROCHLORIDE 5 MG/1
1 TABLET ORAL
Qty: 0 | Refills: 0 | DISCHARGE
Start: 2019-03-06

## 2019-03-06 RX ORDER — ASPIRIN/CALCIUM CARB/MAGNESIUM 324 MG
2 TABLET ORAL
Qty: 0 | Refills: 0 | COMMUNITY

## 2019-03-06 RX ORDER — PANTOPRAZOLE SODIUM 20 MG/1
1 TABLET, DELAYED RELEASE ORAL
Qty: 0 | Refills: 0 | DISCHARGE
Start: 2019-03-06

## 2019-03-06 RX ORDER — DOCUSATE SODIUM 100 MG
1 CAPSULE ORAL
Qty: 0 | Refills: 0 | DISCHARGE
Start: 2019-03-06

## 2019-03-06 RX ORDER — ASPIRIN/CALCIUM CARB/MAGNESIUM 324 MG
81 TABLET ORAL
Qty: 0 | Refills: 0 | COMMUNITY

## 2019-03-06 RX ORDER — TAMSULOSIN HYDROCHLORIDE 0.4 MG/1
1 CAPSULE ORAL
Qty: 0 | Refills: 0 | DISCHARGE
Start: 2019-03-06

## 2019-03-06 RX ORDER — ASPIRIN/CALCIUM CARB/MAGNESIUM 324 MG
1 TABLET ORAL
Qty: 0 | Refills: 0 | DISCHARGE
Start: 2019-03-06

## 2019-03-06 RX ORDER — SENNA PLUS 8.6 MG/1
2 TABLET ORAL
Qty: 0 | Refills: 0 | DISCHARGE
Start: 2019-03-06

## 2019-03-06 RX ORDER — ACETAMINOPHEN 500 MG
2 TABLET ORAL
Qty: 0 | Refills: 0 | DISCHARGE
Start: 2019-03-06

## 2019-03-06 RX ADMIN — Medication 100 MILLIGRAM(S): at 05:15

## 2019-03-06 RX ADMIN — Medication 10 MILLIGRAM(S): at 05:14

## 2019-03-06 RX ADMIN — Medication 1000 MILLIGRAM(S): at 08:51

## 2019-03-06 RX ADMIN — Medication 75 MICROGRAM(S): at 05:14

## 2019-03-06 RX ADMIN — MEMANTINE HYDROCHLORIDE 10 MILLIGRAM(S): 10 TABLET ORAL at 05:14

## 2019-03-06 RX ADMIN — Medication 1000 MILLIGRAM(S): at 00:19

## 2019-03-06 RX ADMIN — Medication 1000 MILLIGRAM(S): at 00:20

## 2019-03-06 RX ADMIN — Medication 81 MILLIGRAM(S): at 05:15

## 2019-03-06 RX ADMIN — CLOPIDOGREL BISULFATE 75 MILLIGRAM(S): 75 TABLET, FILM COATED ORAL at 12:11

## 2019-03-06 RX ADMIN — OXYCODONE HYDROCHLORIDE 5 MILLIGRAM(S): 5 TABLET ORAL at 12:46

## 2019-03-06 RX ADMIN — PANTOPRAZOLE SODIUM 40 MILLIGRAM(S): 20 TABLET, DELAYED RELEASE ORAL at 05:15

## 2019-03-06 RX ADMIN — ATENOLOL 25 MILLIGRAM(S): 25 TABLET ORAL at 05:15

## 2019-03-06 RX ADMIN — OXYCODONE HYDROCHLORIDE 5 MILLIGRAM(S): 5 TABLET ORAL at 12:16

## 2019-03-06 RX ADMIN — Medication 1000 MILLIGRAM(S): at 08:50

## 2019-03-06 NOTE — PROGRESS NOTE ADULT - PROBLEM SELECTOR PLAN 2
Patient with mild anemia of acute post op blood loss.   Patient is asymptomatic.   Monitor CBC at [facility].

## 2019-03-06 NOTE — PROGRESS NOTE ADULT - SUBJECTIVE AND OBJECTIVE BOX
Post Op Day # 1    SUBJECTIVE    74yo Male status post left TKR .   Patient is alert and comfortable.    Pain is controlled with current pain regimen.  Denies nausea, vomiting, chest pain, shortness of breath, abdominal pain or fever.   No new complaints.    OBJECTIVE    Vital Signs Last 24 Hrs  T(C): 36.9 (05 Mar 2019 11:00), Max: 36.9 (05 Mar 2019 11:00)  T(F): 98.4 (05 Mar 2019 11:00), Max: 98.4 (05 Mar 2019 11:00)  HR: 60 (05 Mar 2019 11:00) (57 - 85)  BP: 108/64 (05 Mar 2019 11:00) (95/56 - 137/68)  BP(mean): --  RR: 20 (05 Mar 2019 11:00) (13 - 20)  SpO2: 95% (05 Mar 2019 11:00) (95% - 98%)  I&O's Summary    04 Mar 2019 07:01  -  05 Mar 2019 07:00  --------------------------------------------------------  IN: 2150 mL / OUT: 2480 mL / NET: -330 mL    05 Mar 2019 07:01  -  05 Mar 2019 11:35  --------------------------------------------------------  IN: 117 mL / OUT: 0 mL / NET: 117 mL        Left knee dressing is clean, dry and intact.   The calf is supple/nontender.   Passive range of motion is acceptable to due postoperative pain.   Sensation to light touch is grossly intact distally.   The lateral cutaneous nerve is intact.   Motor function distally is intact.   No foot drop.   (2+) dorsalis pedis pulse. Capillary refill is less than 2 seconds. No cyanosis.                          11.5<L>  10.75<H> )-----------( 129<L>    ( 05 Mar 2019 07:25 )             33.4<L>  05 Mar 2019 07:25                        13.5   10.47 )-----------( 134<L>    ( 04 Mar 2019 17:43 )             39.9   04 Mar 2019 17:43    05 Mar 2019 07:25    140    |  107    |  17     ----------------------------<  149<H>  4.4     |  28     |  1.03   04 Mar 2019 17:43    134<L>  |  101    |  18     ----------------------------<  193<H>  4.1     |  26     |  1.16     Ca    8.5        05 Mar 2019 07:25  Ca    8.6        04 Mar 2019 17:43        ASSESSMENT AND PLAN    - Orthopedically stable  - DVT prophylaxis: PAS + Ecotrin  - Continue physical therapy and occupational therapy  - Weight bearing as tolerated of the left lower extremity with assistance of a walker  - Incentive spirometry encouraged  - Pain control as clinically indicated  - Disposition:  subacute rehabilitation
Admission medication reconciliation/Presurgical evaluation:  Allergies:  penicillin: Swelling, Rash, Hives    Home Medications:   · 	Donepezil (Aricept) 10 mg once a day (at bedtime)  · 	atenolol 25 mg once a day  · 	Tolterodine (Detrol) ER 4mg once a day  · 	Plavix 75 mg once a day  · 	Crestor 20 mg once a day (at bedtime)  · 	Ecotrin 81 milligram(s) orally  · 	Fish Oil once a day  · 	Vitamin B12 50 mcg once a day  · 	Multi Vitamin  · 	aspirin 162mg once a day  · 	Silodosin (Rapaflo) 8 mg once a day (at bedtime)  · 	levothyroxine 75 once a day  · 	memantine 10 mg 2 times a day     Past Medical History:  BPH (benign prostatic hyperplasia)    CAD (coronary artery disease)    Essential hypertension    HLD (hyperlipidemia)    Hypothyroid    Memory loss    Myocardial infarction  2002,2006  JILLIAN on CPAP    Osteoarthritis of left knee      Past Surgical History:  Coronary angioplasty status  PCI with stents 2002 LAD,  2006 x 1  S/P arthroscopy of left knee  2014    PST values of interest:  SCr 1.06 mg/dL  LFTs WNL  QTc 396ms (WNL)
Discharge medication calendar:  (ASA 162mg Qday, Clopidogrel 75mg Qday preop)  Aspirin EC 81mg q12h x 6 weeks then resume ASA 162mg Qday  Clopidogrel 75mg Qday  APAP 1000mg q8h x 2-3 weeks  No NSAID (MI x 2)  Pantoprazole 40mg QAM x 6 weeks  Narcotic PRN  Docusate 100mg TID while taking narcotic  Miralax, Senna, or Bisacodyl PRN for treatment of constipation
Ortho Post Op Check  Procedure: left TKA  Surgeon: Cristine    Pt comfortable without complaints, pain controlled  Denies CP, SOB, N/V, numbness/tingling   Pain Rx:  HYDROmorphone  Injectable 0.5 milliGRAM(s) IV Push every 10 minutes PRN      General Exam:  Vital Signs Last 24 Hrs  T(C): 36.3 (03-04-19 @ 10:30), Max: 36.3 (03-04-19 @ 10:30)  T(F): 97.4 (03-04-19 @ 10:30), Max: 97.4 (03-04-19 @ 10:30)  HR: 75 (03-04-19 @ 11:45) (71 - 78)  BP: 120/62 (03-04-19 @ 11:45) (116/57 - 132/58)  BP(mean): --  RR: 15 (03-04-19 @ 11:45) (12 - 16)  SpO2: 98% (03-04-19 @ 11:45) (93% - 100%)    General: Pt Alert and oriented, NAD, controlled pain.  Heart: RRR no murmur  Lungs:clear  Abdomen: BS+ soft  Ext : Left Knee Dressing clean, dry, & intact.  ROM: Extension 0 deg. Flexion  80 deg   Neuro/Vasc: Feet toes warm, pink. DP = 2+. No calf tenderness bilat.. Has sensation over feet & toes bilat. Full AROM bilat feet & toes. EHL = 5/5  Stood w/ PT.  Exercises reveiwed and performed by pt.  No void as yet, though he feels the urge  VTEP: On Venodynes Bilat + BID Ecotrin       A/P: 73yMale POD#0 s/p   - Stable  - Pain Control  - DVT ppx: ecotrin  - Post op abx: vanco-- one dose remains  - PT eval pending  - Weight bearing status: wbat  - d/c plan-- pt want rehab.
PULMONARY/CRITICAL CARE        Patient is a 73y old  Male who presents with a chief complaint of left knee pain-- for left TKR (04 Mar 2019 14:38)    BRIEF HOSPITAL COURSE: ***    Events last 24 hours: ***    PAST MEDICAL & SURGICAL HISTORY:  HLD (hyperlipidemia)  Essential hypertension  Hypothyroid  CAD (coronary artery disease)  Other secondary osteoarthritis of left knee  Memory loss  BPH (benign prostatic hyperplasia)  Myocardial infarction: 2002,2006  JILLIAN on CPAP 7  Coronary angioplasty status: PCI with stents 2002 LAD,  2006 x 1  S/P arthroscopy of left knee: 2014    Allergies    penicillin (Swelling; Rash; Hives)    Intolerances      FAMILY HISTORY and SOCIAL: smoked 1-2 ppd for 20 yrs until 1996  No etoh.   Family history of lung cancer (Mother): mother      Review of Systems:  CONSTITUTIONAL: No fever, chills, or fatigue  EYES: No eye pain, visual disturbances, or discharge  ENMT:  No difficulty hearing, tinnitus, vertigo; No sinus or throat pain  NECK: No pain or stiffness  RESPIRATORY: No cough, wheezing, chills or hemoptysis; No shortness of breath  CARDIOVASCULAR: No chest pain, palpitations, dizziness, or leg swelling  GASTROINTESTINAL: No abdominal or epigastric pain. No nausea, vomiting, or hematemesis; No diarrhea or constipation. No melena or hematochezia.  GENITOURINARY: No dysuria, frequency, hematuria, or incontinence  NEUROLOGICAL: No headaches, memory loss, loss of strength, numbness, or tremors  SKIN: No itching, burning, rashes, or lesions   MUSCULOSKELETAL:  No muscle, back,  Left knee  pain  PSYCHIATRIC: No depression, anxiety, mood swings, or difficulty sleeping      Medications:  vancomycin  IVPB 1500 milliGRAM(s) IV Intermittent once    ATENolol  Tablet 25 milliGRAM(s) Oral daily  tamsulosin 0.4 milliGRAM(s) Oral at bedtime      acetaminophen  IVPB .. 1000 milliGRAM(s) IV Intermittent every 6 hours  donepezil 10 milliGRAM(s) Oral at bedtime  HYDROmorphone  Injectable 0.5 milliGRAM(s) IV Push every 3 hours PRN  memantine 10 milliGRAM(s) Oral two times a day  ondansetron Injectable 4 milliGRAM(s) IV Push every 6 hours PRN  oxyCODONE    IR 5 milliGRAM(s) Oral every 3 hours PRN  oxyCODONE    IR 10 milliGRAM(s) Oral every 3 hours PRN      aspirin enteric coated 81 milliGRAM(s) Oral two times a day    aluminum hydroxide/magnesium hydroxide/simethicone Suspension 30 milliLiter(s) Oral four times a day PRN  docusate sodium 100 milliGRAM(s) Oral three times a day  magnesium hydroxide Suspension 30 milliLiter(s) Oral daily PRN  pantoprazole    Tablet 40 milliGRAM(s) Oral before breakfast  polyethylene glycol 3350 17 Gram(s) Oral daily PRN  senna 2 Tablet(s) Oral at bedtime    oxybutynin 10 milliGRAM(s) Oral two times a day    atorvastatin 80 milliGRAM(s) Oral at bedtime  levothyroxine 75 MICROGram(s) Oral daily    lactated ringers. 1000 milliLiter(s) IV Continuous <Continuous>                ICU Vital Signs Last 24 Hrs  T(C): 36.6 (04 Mar 2019 15:16), Max: 36.8 (04 Mar 2019 06:57)  T(F): 97.9 (04 Mar 2019 15:16), Max: 98.2 (04 Mar 2019 06:57)  HR: 67 (04 Mar 2019 15:16) (54 - 78)  BP: 122/79 (04 Mar 2019 15:16) (110/60 - 154/83)  BP(mean): --  ABP: --  ABP(mean): --  RR: 17 (04 Mar 2019 15:16) (12 - 17)  SpO2: 98% (04 Mar 2019 15:16) (93% - 100%)    Vital Signs Last 24 Hrs  T(C): 36.6 (04 Mar 2019 15:16), Max: 36.8 (04 Mar 2019 06:57)  T(F): 97.9 (04 Mar 2019 15:16), Max: 98.2 (04 Mar 2019 06:57)  HR: 67 (04 Mar 2019 15:16) (54 - 78)  BP: 122/79 (04 Mar 2019 15:16) (110/60 - 154/83)  BP(mean): --  RR: 17 (04 Mar 2019 15:16) (12 - 17)  SpO2: 98% (04 Mar 2019 15:16) (93% - 100%)        I&O's Detail    04 Mar 2019 07:01  -  04 Mar 2019 19:05  --------------------------------------------------------  IN:    IV PiggyBack: 100 mL    lactated ringers.: 1550 mL    lactated ringers.: 500 mL  Total IN: 2150 mL    OUT:    Intermittent Catheterization - Urethral: 900 mL    Voided: 180 mL  Total OUT: 1080 mL    Total NET: 1070 mL            LABS:                        13.5   10.47 )-----------( 134      ( 04 Mar 2019 17:43 )             39.9     03-04    134<L>  |  101  |  18  ----------------------------<  193<H>  4.1   |  26  |  1.16    Ca    8.6      04 Mar 2019 17:43            CAPILLARY BLOOD GLUCOSE            CULTURES:      Physical Examination:    General: No acute distress.  obese male    HEENT: Pupils equal, reactive to light.  Symmetric.    PULM: Clear to auscultation bilaterally, no significant sputum production    CVS: Regular rate and rhythm, no murmurs, rubs, or gallops    ABD: Soft, nondistended, nontender, normoactive bowel sounds, no masses    EXT: No edema, nontender  Left knee bandaged    SKIN: Warm and well perfused, no rashes noted.    NEURO: Alert, oriented, interactive, nonfocal    RADIOLOGY: ***    CRITICAL CARE TIME SPENT: ***
PULMONARY/CRITICAL CARE      INTERVAL HPI/OVERNIGHT EVENTS:    73y MaleHPI:    Doing well, ambulated. Didnt wear cpap overnite--wore O2    PAST MEDICAL & SURGICAL HISTORY:  HLD (hyperlipidemia)  Essential hypertension  Hypothyroid  CAD (coronary artery disease)  Other secondary osteoarthritis of left knee  Memory loss  BPH (benign prostatic hyperplasia)  Myocardial infarction: 2002,2006  JILLIAN on CPAP  Coronary angioplasty status: PCI with stents 2002 LAD,  2006 x 1  S/P arthroscopy of left knee: 2014        ICU Vital Signs Last 24 Hrs  T(C): 36.8 (06 Mar 2019 07:52), Max: 37 (05 Mar 2019 19:15)  T(F): 98.2 (06 Mar 2019 07:52), Max: 98.6 (05 Mar 2019 19:15)  HR: 65 (06 Mar 2019 07:52) (57 - 80)  BP: 131/71 (06 Mar 2019 07:52) (99/58 - 131/71)  BP(mean): --  ABP: --  ABP(mean): --  RR: 16 (06 Mar 2019 07:52) (15 - 20)  SpO2: 90% (06 Mar 2019 07:52) (90% - 98%)    Qtts:     I&O's Summary    05 Mar 2019 07:01  -  06 Mar 2019 07:00  --------------------------------------------------------  IN: 117 mL / OUT: 1200 mL / NET: -1083 mL                LABS:                        11.4   8.68  )-----------( 115      ( 06 Mar 2019 07:19 )             34.0     03-06    144  |  107  |  18  ----------------------------<  119<H>  4.0   |  32<H>  |  1.07    Ca    8.5      06 Mar 2019 07:19      REVIEW OF SYSTEMS:    CONSTITUTIONAL: No fever, weight loss, or fatigue  RESPIRATORY: No cough, wheezing, chills or hemoptysis; No shortness of breath  CARDIOVASCULAR: No chest pain, palpitations, dizziness, or leg swelling  GASTROINTESTINAL: No abdominal or epigastric pain. No nausea, vomiting, or hematemesis; No diarrhea or constipation. No melena or hematochezia.  GENITOURINARY: No dysuria, frequency, hematuria, or incontinence  NEUROLOGICAL: No headaches, memory loss, loss of strength, numbness, or tremors  SKIN: No itching, burning, rashes, or lesions   LYMPH NODES: No enlarged glands  ENDOCRINE: No heat or cold intolerance; No hair loss  MUSCULOSKELETAL: left knee  joint pain      PHYSICAL EXAM:    GENERAL: NAD, well-groomed, well-developed, NAD  HEAD:  Atraumatic, Normocephalic  EYES: EOMI, PERRLA, conjunctiva and sclera clear  ENMT: No tonsillar erythema, exudates, or enlargement; Moist mucous membranes, Good dentition, No lesions  NECK: Supple, No JVD, Normal thyroid  NERVOUS SYSTEM:  Alert & Oriented X3, Good concentration; Motor Strength 5/5 B/L upper and lower extremities  CHEST/LUNG: Clear to percussion bilaterally; No rales, rhonchi, wheezing, or rubs  HEART: Regular rate and rhythm; No murmurs, rubs, or gallops  ABDOMEN: Soft, Nontender, Nondistended; Bowel sounds present  EXTREMITIES:  2+ Peripheral Pulses, No clubbing, cyanosis, or edema  Left knee bandaged.  LYMPH: No lymphadenopathy noted  SKIN: No rashes or lesions        vanco through     RADIOLOGY & ADDITIONAL STUDIES:      CRITICAL CARE TIME SPENT:
PULMONARY/CRITICAL CARE      INTERVAL HPI/OVERNIGHT EVENTS:    73y MaleHPI: Pt feel well, denies pain. Wore cpap        PAST MEDICAL & SURGICAL HISTORY:  HLD (hyperlipidemia)  Essential hypertension  Hypothyroid  CAD (coronary artery disease)  Other secondary osteoarthritis of left knee  Memory loss  BPH (benign prostatic hyperplasia)  Myocardial infarction: 2002,2006  JILLIAN on CPAP  Coronary angioplasty status: PCI with stents 2002 LAD,  2006 x 1  S/P arthroscopy of left knee: 2014        ICU Vital Signs Last 24 Hrs  T(C): 36.6 (05 Mar 2019 07:41), Max: 36.8 (04 Mar 2019 23:00)  T(F): 97.9 (05 Mar 2019 07:41), Max: 98.3 (04 Mar 2019 23:00)  HR: 57 (05 Mar 2019 07:41) (57 - 85)  BP: 95/56 (05 Mar 2019 07:41) (95/56 - 137/68)  BP(mean): --  ABP: --  ABP(mean): --  RR: 17 (05 Mar 2019 07:41) (12 - 18)  SpO2: 97% (05 Mar 2019 07:41) (93% - 100%)    Qtts:     I&O's Summary    04 Mar 2019 07:01  -  05 Mar 2019 07:00  --------------------------------------------------------  IN: 2150 mL / OUT: 2480 mL / NET: -330 mL            REVIEW OF SYSTEMS:    CONSTITUTIONAL: No fever, weight loss, or fatigue  EYES: No eye pain, visual disturbances, or discharge  ENMT:  No difficulty hearing, tinnitus, vertigo; No sinus or throat pain  NECK: No pain or stiffness  BREASTS: No pain, masses, or nipple discharge  RESPIRATORY: No cough, wheezing, chills or hemoptysis; No shortness of breath  CARDIOVASCULAR: No chest pain, palpitations, dizziness, or leg swelling  GASTROINTESTINAL: No abdominal or epigastric pain. No nausea, vomiting, or hematemesis; No diarrhea or constipation. No melena or hematochezia.  GENITOURINARY: No dysuria, frequency, hematuria, or incontinence  NEUROLOGICAL: No headaches, memory loss, loss of strength, numbness, or tremors  SKIN: No itching, burning, rashes, or lesions   LYMPH NODES: No enlarged glands  ENDOCRINE: No heat or cold intolerance; No hair loss  MUSCULOSKELETAL: left knee  joint pain  PSYCHIATRIC: No depression, anxiety, mood swings, or difficulty sleeping  HEME/LYMPH: No easy bruising, or bleeding gums  ALLERGY AND IMMUNOLOGIC: No hives or eczema      PHYSICAL EXAM:    GENERAL: NAD, well-groomed, well-developed, NAD  HEAD:  Atraumatic, Normocephalic  EYES: EOMI, PERRLA, conjunctiva and sclera clear  ENMT: No tonsillar erythema, exudates, or enlargement; Moist mucous membranes, Good dentition, No lesions  NECK: Supple, No JVD, Normal thyroid  NERVOUS SYSTEM:  Alert & Oriented X3, Good concentration; Motor Strength 5/5 B/L upper and lower extremities  CHEST/LUNG: Clear to percussion bilaterally; No rales, rhonchi, wheezing, or rubs  HEART: Regular rate and rhythm; No murmurs, rubs, or gallops  ABDOMEN: Soft, Nontender, Nondistended; Bowel sounds present  EXTREMITIES:  2+ Peripheral Pulses, No clubbing, cyanosis, or edema  Left knee bandaged.  LYMPH: No lymphadenopathy noted  SKIN: No rashes or lesions        LABS:                        11.5   10.75 )-----------( 129      ( 05 Mar 2019 07:25 )             33.4     03-05    140  |  107  |  17  ----------------------------<  149<H>  4.4   |  28  |  1.03    Ca    8.5      05 Mar 2019 07:25            vanco through     RADIOLOGY & ADDITIONAL STUDIES:      CRITICAL CARE TIME SPENT:
Patient is a 73y old  Male who presents with a chief complaint of left knee pain-- for left TKA (04 Mar 2019 14:38)    HPI: 73-year-old gentleman with known history of coronary artery disease, status post MI x2, history of stent placement, hypertension, hyperlipidemia, hypothyroidism, obstructive sleep apnea on CPAP, benign prostatic hypertrophy, cognitive impairment, and osteoarthritis of the knee who had failed outpatient treatment for his left knee pain.  The patient had increasing difficulty ambulating and performing activities of daily living.  He was recommended left total knee arthroplasty.  The patient is seen postoperatively at the request of Orthopedics for medical comanagement.    INTERVAL HPI/OVERNIGHT EVENTS:  Chart reviewed,  notes reviewed.  Patient seen and examined.  Pain is fairly controlled with medications.  Ambulated with PT.   Doing incentive spirometry on regular basis.    Pain Location & Control: Left knee, controlled.     03/05/19 --> Doing well. Pain is well controlled. Denies any chest pain or pressure. No nausea or vomiting.  No fever or chills.     03/06/19 --> Doing well. Denies any chest pain or pressure. Pain is well controlled. For rehab today.     MEDICATIONS  (STANDING):  acetaminophen   Tablet .. 1000 milliGRAM(s) Oral every 8 hours  aspirin enteric coated 81 milliGRAM(s) Oral two times a day  ATENolol  Tablet 25 milliGRAM(s) Oral daily  atorvastatin 80 milliGRAM(s) Oral at bedtime  clopidogrel Tablet 75 milliGRAM(s) Oral daily  docusate sodium 100 milliGRAM(s) Oral three times a day  donepezil 10 milliGRAM(s) Oral at bedtime  lactated ringers. 1000 milliLiter(s) (125 mL/Hr) IV Continuous <Continuous>  levothyroxine 75 MICROGram(s) Oral daily  memantine 10 milliGRAM(s) Oral two times a day  oxybutynin 10 milliGRAM(s) Oral two times a day  pantoprazole    Tablet 40 milliGRAM(s) Oral before breakfast  senna 2 Tablet(s) Oral at bedtime  tamsulosin 0.4 milliGRAM(s) Oral at bedtime    MEDICATIONS  (PRN):  aluminum hydroxide/magnesium hydroxide/simethicone Suspension 30 milliLiter(s) Oral four times a day PRN Indigestion  bisacodyl Suppository 10 milliGRAM(s) Rectal daily PRN If no bowel movement by postoperative day #2  HYDROmorphone  Injectable 0.5 milliGRAM(s) IV Push every 3 hours PRN Severe Pain (7 - 10)  magnesium hydroxide Suspension 30 milliLiter(s) Oral daily PRN Constipation  ondansetron Injectable 4 milliGRAM(s) IV Push every 6 hours PRN Nausea and/or Vomiting  oxyCODONE    IR 5 milliGRAM(s) Oral every 3 hours PRN Mild Pain (1 - 3)  oxyCODONE    IR 10 milliGRAM(s) Oral every 3 hours PRN Moderate Pain (4 - 6)  polyethylene glycol 3350 17 Gram(s) Oral daily PRN Constipation      Allergies:  penicillin (Swelling; Rash; Hives)    Intolerances    REVIEW OF SYSTEMS:  CONSTITUTIONAL: No fever, weight loss, or fatigue  EES: No eye pain, visual disturbances, or discharge  ENMT:  No difficulty hearing, tinnitus, vertigo; No sinus or throat pain  NECK: No pain or stiffness  BREASTS: No pain, masses, or nipple discharge  RESPIRATORY: No cough, wheezing, chills or hemoptysis; No shortness of breath  CARDIOVASCULAR: No chest pain, palpitations, dizziness, or leg swelling  GASTROINTESTINAL: No abdominal or epigastric pain. No nausea, vomiting, or hematemesis; No diarrhea or constipation. No melena or hematochezia.  GENITOURINARY: No dysuria, frequency, hematuria, or incontinence  NEUROLOGICAL: No headaches, memory loss, loss of strength, numbness, or tremors  SKIN: No itching, burning, rashes, or lesions   LYMPH NODES: No enlarged glands  ENDOCRINE: No heat or cold intolerance; No hair loss; No polydipsia or polyuria  MUSCULOSKELETAL: No back pain  PSYCHIATRIC: No depression, anxiety, mood swings, or difficulty sleeping  HEME/LYMPH: No easy bruising, or bleeding gums  ALLERGY AND IMMUNOLOGIC: No hives or eczema    Vital Signs Last 24 Hrs  T(C): 36.8 (06 Mar 2019 07:52), Max: 37 (05 Mar 2019 19:15)  T(F): 98.2 (06 Mar 2019 07:52), Max: 98.6 (05 Mar 2019 19:15)  HR: 65 (06 Mar 2019 07:52) (57 - 80)  BP: 131/71 (06 Mar 2019 07:52) (102/57 - 131/71)  BP(mean): --  RR: 16 (06 Mar 2019 07:52) (15 - 17)  SpO2: 90% (06 Mar 2019 07:52) (90% - 98%)    PHYSICAL EXAM:  GENERAL: NAD, well-groomed, well-developed  HEAD:  Atraumatic, Normocephalic  EYES: EOMI, PERRLA, conjunctiva and sclera clear  ENMT: No tonsillar erythema, exudates, or enlargement; Moist mucous membranes, Good dentition, No lesions  NECK: Supple, No JVD, Normal thyroid  NERVOUS SYSTEM:  Alert & Oriented X3, Good concentration; Bilateral LE mobile, sensation to light touch intact  CHEST/LUNG: Clear to auscultation bilaterally; No rales, rhonchi, wheezing, or rubs  HEART: Regular rate and rhythm; No murmurs, rubs, or gallops  ABDOMEN: Soft, Nontender, Nondistended; Bowel sounds present  EXTREMITIES:  2+ Peripheral Pulses, No clubbing or cyanosis  LYMPH: No lymphadenopathy noted  SKIN: No rashes or lesions  INCISION:  Dressing dry and intact    LABS:                        11.4   8.68  )-----------( 115      ( 06 Mar 2019 07:19 )             34.0     06 Mar 2019 07:19    144    |  107    |  18     ----------------------------<  119    4.0     |  32     |  1.07     Ca    8.5        06 Mar 2019 07:19    RADIOLOGY & ADDITIONAL TESTS:  < from: Xray Knee 3 Views, Left (03.04.19 @ 10:20) >  EXAM:  KNEE AP LAT & OBL LEFT                        PROCEDURE DATE:  03/04/2019    INTERPRETATION:  Clinical information: Left knee osteoarthritis.    Portable postop study obtained in the operating room, 10:02 AM    2 views, frontal and lateral    A total left knee replacement is identified, in place. Skin staples   present anterior soft tissues. Suggest follow-up left knee radiographs   when clinically warranted.    IMPRESSION: See above report      < end of copied text >    Imaging Personally Reviewed:  [X] YES      Consultant(s) Notes Reviewed:  Yes    Care Discussed with Consultants/Other Providers: Yes
Patient is a 73y old  Male who presents with a chief complaint of left knee pain-- for left TKA (04 Mar 2019 14:38)    HPI: 73-year-old gentleman with known history of coronary artery disease, status post MI x2, history of stent placement, hypertension, hyperlipidemia, hypothyroidism, obstructive sleep apnea on CPAP, benign prostatic hypertrophy, cognitive impairment, and osteoarthritis of the knee who had failed outpatient treatment for his left knee pain.  The patient had increasing difficulty ambulating and performing activities of daily living.  He was recommended left total knee arthroplasty.  The patient is seen postoperatively at the request of Orthopedics for medical comanagement.    INTERVAL HPI/OVERNIGHT EVENTS:  Chart reviewed,  notes reviewed.  Patient seen and examined.  Pain is fairly controlled with medications.  Ambulated with PT.   Doing incentive spirometry on regular basis.    Pain Location & Control: Left knee, controlled.     03/05/19 --> Doing well. Pain is well controlled. Denies any chest pain or pressure. No nausea or vomiting.  No fever or chills.     MEDICATIONS  (STANDING):  acetaminophen   Tablet .. 1000 milliGRAM(s) Oral every 8 hours  aspirin enteric coated 81 milliGRAM(s) Oral two times a day  ATENolol  Tablet 25 milliGRAM(s) Oral daily  atorvastatin 80 milliGRAM(s) Oral at bedtime  clopidogrel Tablet 75 milliGRAM(s) Oral daily  docusate sodium 100 milliGRAM(s) Oral three times a day  donepezil 10 milliGRAM(s) Oral at bedtime  lactated ringers. 1000 milliLiter(s) (125 mL/Hr) IV Continuous <Continuous>  levothyroxine 75 MICROGram(s) Oral daily  memantine 10 milliGRAM(s) Oral two times a day  oxybutynin 10 milliGRAM(s) Oral two times a day  pantoprazole    Tablet 40 milliGRAM(s) Oral before breakfast  senna 2 Tablet(s) Oral at bedtime  tamsulosin 0.4 milliGRAM(s) Oral at bedtime    MEDICATIONS  (PRN):  aluminum hydroxide/magnesium hydroxide/simethicone Suspension 30 milliLiter(s) Oral four times a day PRN Indigestion  HYDROmorphone  Injectable 0.5 milliGRAM(s) IV Push every 3 hours PRN Severe Pain (7 - 10)  magnesium hydroxide Suspension 30 milliLiter(s) Oral daily PRN Constipation  ondansetron Injectable 4 milliGRAM(s) IV Push every 6 hours PRN Nausea and/or Vomiting  oxyCODONE    IR 5 milliGRAM(s) Oral every 3 hours PRN Mild Pain (1 - 3)  oxyCODONE    IR 10 milliGRAM(s) Oral every 3 hours PRN Moderate Pain (4 - 6)  polyethylene glycol 3350 17 Gram(s) Oral daily PRN Constipation      Allergies:  penicillin (Swelling; Rash; Hives)    Intolerances    REVIEW OF SYSTEMS:  CONSTITUTIONAL: No fever, weight loss, or fatigue  EYES: No eye pain, visual disturbances, or discharge  ENMT:  No difficulty hearing, tinnitus, vertigo; No sinus or throat pain  NECK: No pain or stiffness  BREASTS: No pain, masses, or nipple discharge  RESPIRATORY: No cough, wheezing, chills or hemoptysis; No shortness of breath  CARDIOVASCULAR: No chest pain, palpitations, dizziness, or leg swelling  GASTROINTESTINAL: No abdominal or epigastric pain. No nausea, vomiting, or hematemesis; No diarrhea or constipation. No melena or hematochezia.  GENITOURINARY: No dysuria, frequency, hematuria, or incontinence  NEUROLOGICAL: No headaches, memory loss, loss of strength, numbness, or tremors  SKIN: No itching, burning, rashes, or lesions   LYMPH NODES: No enlarged glands  ENDOCRINE: No heat or cold intolerance; No hair loss; No polydipsia or polyuria  MUSCULOSKELETAL: No back pain  PSYCHIATRIC: No depression, anxiety, mood swings, or difficulty sleeping  HEME/LYMPH: No easy bruising, or bleeding gums  ALLERGY AND IMMUNOLOGIC: No hives or eczema    Vital Signs Last 24 Hrs  T(C): 36.6 (05 Mar 2019 15:25), Max: 36.9 (05 Mar 2019 11:00)  T(F): 97.9 (05 Mar 2019 15:25), Max: 98.4 (05 Mar 2019 11:00)  HR: 70 (05 Mar 2019 15:25) (57 - 85)  BP: 102/61 (05 Mar 2019 15:25) (95/56 - 137/68)  BP(mean): --  RR: 16 (05 Mar 2019 15:25) (16 - 20)  SpO2: 98% (05 Mar 2019 15:25) (95% - 98%)    PHYSICAL EXAM:  GENERAL: NAD, well-groomed, well-developed  HEAD:  Atraumatic, Normocephalic  EYES: EOMI, PERRLA, conjunctiva and sclera clear  ENMT: No tonsillar erythema, exudates, or enlargement; Moist mucous membranes, Good dentition, No lesions  NECK: Supple, No JVD, Normal thyroid  NERVOUS SYSTEM:  Alert & Oriented X3, Good concentration; Bilateral LE mobile, sensation to light touch intact  CHEST/LUNG: Clear to auscultation bilaterally; No rales, rhonchi, wheezing, or rubs  HEART: Regular rate and rhythm; No murmurs, rubs, or gallops  ABDOMEN: Soft, Nontender, Nondistended; Bowel sounds present  EXTREMITIES:  2+ Peripheral Pulses, No clubbing or cyanosis  LYMPH: No lymphadenopathy noted  SKIN: No rashes or lesions  INCISION:  Dressing dry and intact    LABS:                        11.5   10.75 )-----------( 129      ( 05 Mar 2019 07:25 )             33.4     05 Mar 2019 07:25    140    |  107    |  17     ----------------------------<  149    4.4     |  28     |  1.03     Ca    8.5        05 Mar 2019 07:25      RADIOLOGY & ADDITIONAL TESTS:  < from: Xray Knee 3 Views, Left (03.04.19 @ 10:20) >  EXAM:  KNEE AP LAT & OBL LEFT                        PROCEDURE DATE:  03/04/2019    INTERPRETATION:  Clinical information: Left knee osteoarthritis.    Portable postop study obtained in the operating room, 10:02 AM    2 views, frontal and lateral    A total left knee replacement is identified, in place. Skin staples   present anterior soft tissues. Suggest follow-up left knee radiographs   when clinically warranted.    IMPRESSION: See above report      < end of copied text >    Imaging Personally Reviewed:  [X] YES      Consultant(s) Notes Reviewed:  Yes    Care Discussed with Consultants/Other Providers: Yes
Post Op     CARLITOS LOPEZ      73y        Male                                                                                                                 T(C): 36.8 (03-06-19 @ 03:36), Max: 37 (03-05-19 @ 19:15)  HR: 65 (03-06-19 @ 05:13) (57 - 80)  BP: 123/76 (03-06-19 @ 05:13) (95/56 - 124/70)  RR: 15 (03-06-19 @ 03:36) (15 - 20)  SpO2: 95% (03-06-19 @ 03:36) (92% - 98%)      S/P   total knee replacement    Patient denies shortness of breath, chest pain, dyspnea, No complaints  Pain is 3 /10    Physical Exam    Extremity: Bilaterally:  No holmon                                           No Cord                                          PAS on b/l                                           Neurovascular intact                                          Motor intact EHL/FHL                                          Sensation intact                                          Pulses intact DP/PT                                         Calves Soft                                         Dressing Clean / Dry / Intact changed  incision clean dry and intact staples, mild swelling , dressing applied                                          Capillary refill with 5 seconds                          11.5   10.75 )-----------( 129      ( 05 Mar 2019 07:25 )             33.4       03-05    140  |  107  |  17  ----------------------------<  149<H>  4.4   |  28  |  1.03    Ca    8.5      05 Mar 2019 07:25        A/P  -- S/P total knee replacement     -  Medicine To Follow   - DVT prophylaxis PAS, plavix , ASA 81mg po bid  - No celebrex  History of MI  as per provider handoff  - PT & OT   - Analagesia  - Incentive Spirometry  - Discharge Planning  - Safety Precautions  -  CBC , BMP daily

## 2019-03-06 NOTE — PROGRESS NOTE ADULT - PROVIDER SPECIALTY LIST ADULT
Internal Medicine
Internal Medicine
Orthopedics
Orthopedics
Pharmacy
Pharmacy
Pulmonology
Orthopedics

## 2019-03-06 NOTE — PROGRESS NOTE ADULT - PROBLEM SELECTOR PLAN 1
S/P Left TKA, post op day # 2.   On ASA/Plavix for DVT prophylaxis.   On Tylenol and oxycodone as needed for pain.   Continue PT/CPM.   Encourage incentive spirometry.   Monitor labs.   Stable for ISHAN.

## 2019-08-21 NOTE — OCCUPATIONAL THERAPY INITIAL EVALUATION ADULT - PHYSICAL ASSIST/NONPHYSICAL ASSIST: SIT/STAND, REHAB EVAL
1 person + 1 person to manage equipment SSKI Counseling:  I discussed with the patient the risks of SSKI including but not limited to thyroid abnormalities, metallic taste, GI upset, fever, headache, acne, arthralgias, paraesthesias, lymphadenopathy, easy bleeding, arrhythmias, and allergic reaction.

## 2019-12-15 NOTE — H&P PST ADULT - GENITOURINARY COMMENTS
Airway patent, TM normal bilaterally, normal appearing mouth, nose, throat, neck supple with full range of motion, no cervical adenopathy.
bph

## 2020-07-30 NOTE — PROGRESS NOTE ADULT - PROBLEM SELECTOR PLAN 8
ASA BID for DVT prophylaxis.   Protonix for GI prophylaxis.
ASA BID for DVT prophylaxis.   Protonix for GI prophylaxis.
Statement Selected

## 2021-08-10 NOTE — H&P PST ADULT - BP NONINVASIVE DIASTOLIC (MM HG)
Patient discharged to [facility] ([facility]) located at [address], [place] [address]; ([phone]). transported via Ambulance. Pts daughter, Rain 
([phone]) consented to transfer to [facility] . At this time, pt presents calm and 
cooperative at time of discharge.. Pt is on continuos O2 via N/C @ 2L/M. Medical clearance 
given by Marvin Garcia NP. Dr Barnes ordered Zyprexa Zydis 5mg sublingual prior to transport. 
Vital taken /62 HR 66. Dr barnes asked to consult with CHRISTA Garcia prior to administering 
zyprexa Zydis 5mg sublingual. CHRISTA Garcia ordered administration of Zyprexa Zydis 5mg 
sublingual. Report given to Kathi ALLEN from [facility]. Kathi aware that 
pt is on continuos O2 and needs a 1:1 sitter in order to help maintain O2@ sats @ 88% - 92%. 
Personal meds SUBOXONE 8MG2MG SL FILM in the amount of 7.5 given to transport team in order 
to give to facility Pt is cooperative. Pt denies both suicidal and homicidal ideation as 
well as auditory and visual hallucinations. Pt will continue to be under the care of her 
psychiatrist, Dr. Gonsales located at [phone]

## 2022-10-05 NOTE — DISCHARGE NOTE ADULT - CONDITION (STATED IN TERMS THAT PERMIT A SPECIFIC MEASURABLE COMPARISON WITH CONDITION ON ADMISSION):
[facility] Hematology and Oncology Progress Note    Patient: Theo Meyers  MRN: [id number]  Date of Service: Oct 5, 2022          Reason for Visit    Chief Complaint   Patient presents with     Hematology     Anemia        Assessment and Plan    Cancer Staging  No matching staging information was found for the patient.    1.  Myeloma, kappa light chain disease: Patient had natural killer cell therapy at the [facility] Dr. Guan.  This does not appear to have helped his myeloma unfortunately.  He had his myeloma labs drawn last week and his light chains are significantly elevated which is very worrisome.  Unfortunately patient's performance status and his heart is preventing us from really doing any significant treatment.  Patient is also having some pancytopenia issues which may be from his myeloma or his previous treatment.  Last dose of chemo in July, last dose of Daratumumab 9/8. At this point patient would like to pursue more treatment for his myeloma if possible.  Per Dr. Clifford he feels like he needs to have his heart fixed before we can do that.  I will have patient see Dr. Clifford in 2 weeks to evaluate what is been going on and to see if we can pursue some treatment.  Some treatment options that Dr. Guan suggested were: Selinexor/Dex, CyBorD, Ninlaro/Dex     2. DVT: This is recurrent.    Is been on Xarelto but we have been holding it since his platelets have been less than 50.     3.  Pancytopenia: Unclear if this is from his disease or all of his previous treatment.  He has not really had any major treatment for a while now.  Fludarabine/Cytoxan, daratumumab and NK cell therapy was done at the end of July.  That has now been over 8 weeks ago.  He did have a little bit of an improvement in his counts last week but today they are worse again.  I am slightly concerned that some of his pancytopenia may be from his myeloma.  We may have to do a bone marrow biopsy at some point to  evaluate.  For right now we will do weekly labs on patient.  He knows about neutropenic precautions.  We want to keep his hemoglobin above 8 since he has some any heart issues.  He did get 1 unit of blood on Monday.  No need for any products today.  Encourage patient to call us with any worsening symptoms especially infection, bruising or bleeding    4.  Aortic valve stenosis: Patient is quite symptomatic, likely from this issue.  He has significant chest pain even at rest.  He also has a lot of dyspnea on exertion.  He did see cardiology about a week and a half ago.  He had a CT of his heart yesterday and is waiting an appointment tomorrow.  Is hoping to get a TAVR.  If that is the case the sooner this gets done the better so we can try to pursue some treatment for his myeloma.  Other than his shortness of breath and chest issues he has a good performance status.    5.  Pulmonary artery hypertension: Patient may need to see someone at the [place] for this.  At this point we can hold off until his heart issues hopefully improved.    6.  Prophylaxis: Patient continues on acyclovir and levofloxacin.  Would like to continue these until his neutrophil count is consistently above 500.  He also I believe was started on pentamadine at the [facility].  His last dose was September 15.  He likely should be getting that monthly.     ECOG Performance    0 - Independent    Distress Screening (within last 30 days)    No data recorded     Pain  Pain Score: Severe Pain (6)    Problem List    Patient Active Problem List   Diagnosis     Multiple myeloma (H)     Unspecified glaucoma     Cataract     Shingles (herpes zoster) polyneuropathy     Back pain     Post herpetic neuralgia     Pancytopenia (H)     Syncope and collapse     History of DVT (deep vein thrombosis)     Spinal stenosis of lumbar region without neurogenic claudication     Anemia, vitamin B12 deficiency     Chemotherapy-induced neutropenia (H)      Thrombocytopenia (H)     Persistent atrial fibrillation (H)     Nonrheumatic aortic valve stenosis     SAMANO (dyspnea on exertion)     Immunocompromised state (H)     Neutropenic fever (H)     Personal history of DVT (deep vein thrombosis)     Pneumonia of both lungs due to infectious organism, unspecified part of lung     Acute heart failure with preserved ejection fraction (HFpEF) (H)     Moderate pulmonary hypertension (H)     Status post coronary angiogram     Chronic kidney disease, stage 3a (H)     COPD (chronic obstructive pulmonary disease) (H)     Multiple myeloma in relapse (H)     Stem cells transplant status (H)        ______________________________________________________________________________    History of Present Illness    DIAGNOSIS:   1. IgG kappa light chain myeloma. Hyposecretory. Diagnosed 2009.   He presented at diagnosis with a lytic lesion involving the C6 vertebral body, although no measurable M protein. IgG kappa monoclonal immunoglobulin was confirmed by ELMA as well as elevated kappa free light chains with an abnormal kappa lambda ratio. Bone marrow biopsy showed 30% involvement and cytogenetics confirmed deletion of 13 q. as well as 11;14 translocation.    2. Deep vein thrombosis of the left leg diagnosed 09/2012 while on treatment.       CURRENT TREATMENT:   Observation.  Patient last had treatment at the [facility] with fludarabine and Cytoxan given on July 22.  Patient then had infusion of  NK cellular therapy on July 25.  Patient also went on to have weekly daratumumab subcu. Last dose 9/8/22.          PAST TREATMENT and HISTORY:    -He was initially treated with Velcade and dexamethasone and achieved a good partial response.     -He was referred to the [facility] where he underwent high-dose chemotherapy with autologous peripheral stem cell transplant in April of 2010.     -He began Revlimid as maintenance therapy post transplant.     -Repeat bone  marrow biopsy done October 2010 showed about 5% kappa restricted plasma cells and so at that time weekly Velcade was added along with his maintenance Revlimid and dexamethasone. He required dose reductions of weekly Velcade because of cytopenias and was ultimately switched to a q.2 week maintenance schedule which he has been on since September 2012. His Revlimid dose is 20 mg daily and he continues on dexamethasone 20 mg p.o. q. Week.  Was on this until October 2020 when he had signs of progression.  His Revlimid dose was reduced to 15 mg daily and he continues on dexamethasone 20 mg p.o. weekly  Revlimid dosing changed to 20 mg, 3 weeks on 1 week (instead of 15 mg daily) for cost reasons. Started March, 2018     Started daratumumab/pomalidomide/dexamethasone (20mg every 14 days) on October 14, 2020.     Carfilzomib/dexamethasone started 4/6/22. Days 1, 2, 8, 9, 15, 16     Flu/Cy/Brianne  (allogeneic cell therapy/NK cell)  7/25/2022 clinical trial    Progressed on:  bortezomib  lenalidomide        INTERIM HISTORY:     Pt is here today for follow up.  He is doing poorly. Mainly issues with chest pain, SOB. Ongoing issues. Causing him to not sleep well. Hoping heart clinic will be able to help him. Unable to walk and do any exercise because of it. Denies pain. Denies bleeding. Having bruising. No fevers/infectious complaints. Pt very anxious about worsening myeloma.     Review of Systems    Pertinent items are noted in HPI.    Past History    Past Medical History:   Diagnosis Date     Anemia 12/13/2017     Arthritis      Bilateral inguinal hernia      Chest tube in place      Elevated INR      Glaucoma      Glaucoma      History of blood clots     DVT - left chronic     Multiple myeloma (H)     Gets chemo infusions every 2 weeks     Pancytopenia (H)      Parapneumonic effusion      Peripheral neuropathy      Shingles 9/24/2014     Syncope      TMJ (temporomandibular joint disorder)        PHYSICAL EXAM  BP 96/51    Pulse 61   Temp 97.6  F (36.4  C)   Resp 20   Wt 74 kg (163 lb 1.6 oz)   SpO2 100%   BMI 24.80 kg/m      GENERAL: no acute distress. Cooperative in conversation. Here with wife today. Mask on  RESP: Regular respiratory rate. No expiratory wheezes   MUSCULOSKELETAL: no bilateral leg swelling  NEURO: non focal. Alert and oriented x3.   PSYCH: within normal limits. No depression or anxiety.  SKIN: exposed skin is dry intact.  Some bruising around eyes.       Lab Results    Recent Results (from the past 168 hour(s))   Comprehensive metabolic panel   Result Value Ref Range    Sodium 137 136 - 145 mmol/L    Potassium 4.8 3.4 - 5.3 mmol/L    Chloride 101 98 - 107 mmol/L    Carbon Dioxide (CO2) 27 22 - 29 mmol/L    Anion Gap 9 7 - 15 mmol/L    Urea Nitrogen 42.7 (H) 8.0 - 23.0 mg/dL    Creatinine 1.93 (H) 0.67 - 1.17 mg/dL    Calcium 8.4 (L) 8.8 - 10.2 mg/dL    Glucose 107 (H) 70 - 99 mg/dL    Alkaline Phosphatase 105 40 - 129 U/L    AST 26 10 - 50 U/L    ALT 57 (H) 10 - 50 U/L    Protein Total 4.8 (L) 6.4 - 8.3 g/dL    Albumin 3.4 (L) 3.5 - 5.2 g/dL    Bilirubin Total 0.3 <=1.2 mg/dL    GFR Estimate 35 (L) >60 mL/min/1.73m2   Magnesium   Result Value Ref Range    Magnesium 1.9 1.7 - 2.3 mg/dL   Phosphorus   Result Value Ref Range    Phosphorus 4.2 2.5 - 4.5 mg/dL   Lactate Dehydrogenase   Result Value Ref Range    Lactate Dehydrogenase 169 0 - 250 U/L   Bilirubin direct   Result Value Ref Range    Bilirubin Direct <0.20 0.00 - 0.30 mg/dL   Protein Immunofixation Serum   Result Value Ref Range    Immunofixation ELP       Monoclonal free immunoglobulin light chain of kappa chain type.  Monoclonal IgG immunoglobulin of kappa light chain type. Monoclonal antibody therapeutics (e.g. Daratumumab) may appear as monoclonal proteins on serum electrophoresis and immunofixation. Results should be interpreted with caution.  Pathologic significance requires clinical correlation. Waldemar Enamorado MD   Immunoglobulins A G and M    Result Value Ref Range    Immunoglobulin G 186 (L) 610 - 1,616 mg/dL    Immunoglobulin A <2 (L) 84 - 499 mg/dL    Immunoglobulin M <10 (L) 35 - 242 mg/dL   Protein electrophoresis timed urine   Result Value Ref Range    Albumin Urine 20.6 (H) <=0.0 %    Alpha 1 Urine 2.5 (H) <=0.0 %    Alpha 2 Urine 5.7 (H) <=0.0 %    Beta Globulin Urine 58.4 (H) <=0.0 %    Gamma Globulin Urine 12.8 (H) <=0.0 %    Monocloncal Peak Urine % 25.7 (H) <=0.0 %    ELP Interpretation Urine       Albumin and globulins seen. A monoclonal protein (25.7%) is seen in the beta fraction. A possible second small monoclonal protein is also present. See immunofixation report on this sample.  Pathologic significance requires clinical correlation. Waldemar Enamorado MD   Protein immunofixation urine   Result Value Ref Range    Immunofixation ELP Urine       Two monoclonal free immunoglobulin light chains of kappa type.  Pathologic significance requires clinical correlation. Waldemar Enamorado MD   [place] and lambda light chain   Result Value Ref Range    Kappa Free Light Chains 152.02 (H) 0.33 - 1.94 mg/dL    Lambda Free Light Chains 0.18 (L) 0.57 - 2.63 mg/dL    Kappa /Lambda Ratio 844.56 (H) 0.26 - 1.65   CBC with platelets and differential   Result Value Ref Range    WBC Count 1.4 (L) 4.0 - 11.0 10e3/uL    RBC Count 2.38 (L) 4.40 - 5.90 10e6/uL    Hemoglobin 8.0 (L) 13.3 - 17.7 g/dL    Hematocrit 24.7 (L) 40.0 - 53.0 %     (H) 78 - 100 fL    MCH 33.6 (H) 26.5 - 33.0 pg    MCHC 32.4 31.5 - 36.5 g/dL    RDW 16.3 (H) 10.0 - 15.0 %    Platelet Count 29 (LL) 150 - 450 10e3/uL    % Neutrophils 75 %    % Lymphocytes 6 %    % Monocytes 16 %    % Eosinophils 2 %    % Basophils 0 %    % Immature Granulocytes 1 %    NRBCs per 100 WBC 0 <1 /100    Absolute Neutrophils 1.0 (L) 1.6 - 8.3 10e3/uL    Absolute Lymphocytes 0.1 (L) 0.8 - 5.3 10e3/uL    Absolute Monocytes 0.2 0.0 - 1.3 10e3/uL    Absolute Eosinophils 0.0 0.0 - 0.7 10e3/uL    Absolute  Basophils 0.0 0.0 - 0.2 10e3/uL    Absolute Immature Granulocytes 0.0 <=0.4 10e3/uL    Absolute NRBCs 0.0 10e3/uL   Total Protein, Serum for ELP   Result Value Ref Range    Total Protein Serum for ELP 4.5 (L) 6.4 - 8.3 g/dL   Protein Electrophoresis, Serum   Result Value Ref Range    Albumin 2.9 (L) 3.7 - 5.1 g/dL    Alpha 1 0.3 0.2 - 0.4 g/dL    Alpha 2 0.6 0.5 - 0.9 g/dL    Beta Globulin 0.5 (L) 0.6 - 1.0 g/dL    Gamma Globulin 0.2 (L) 0.7 - 1.6 g/dL    Monoclonal Peak 0.1 (H) <=0.0 g/dL    ELP Interpretation       Small monoclonal protein (0.1 g/dL) seen in the gamma fraction. Very small monoclonal protein (0.1 g/dL) seen in the alpha 2 fraction. See immunofixation report on same specimen. Hypoalbuminemia. Decreased beta fraction. Marked hypogammaglobulinemia. Pathologic significance requires clinical correlation. Waldemar Enamorado MD   Adult Type and Screen   Result Value Ref Range    Antibody Screen Positive (A) Negative    SPECIMEN EXPIRATION DATE 20221003235900    Antibody Screen (Reflex)   Result Value Ref Range    ANTIBODY SCREEN, TUBE POS     SPECIMEN EXPIRATION DATE 20221003235900    ABO/RH Type & Screen   Result Value Ref Range    SPECIMEN EXPIRATION DATE [id number]     ABORH A POS    Prepare red blood cells (unit)   Result Value Ref Range    Blood Component Type Red Blood Cells     Product Code J2815G60     Unit Status Released     Unit Number [id number]     UNIT ABO/RH O+     CROSSMATCH LEAST INCOMPATIBLE     CODING SYSTEM [id number]     UNIT TYPE ISBT 5100    Other Laboratory; MBC; Reference Lab Workup (Laboratory Miscellaneous Order)   Result Value Ref Range    Performing Laboratory MBC     Test Name Reference Lab Workup     Test Code     Extra Red Top Tube   Result Value Ref Range    Hold Specimen JIC    Extra Purple Top Tube   Result Value Ref Range    Hold Specimen JIC    Prepare red blood cells (unit)   Result Value Ref Range    ISSUE DATE AND TIME [id number]     Blood Component Type Red  Blood Cells     Product Code W7400X19     Unit Status Transfused     Unit Number [id number]     UNIT ABO/RH O+     CROSSMATCH LEAST INCOMPATIBLE     CODING SYSTEM NIUE351     UNIT TYPE ISBT 5100    CBC with platelets and differential   Result Value Ref Range    WBC Count 0.7 (LL) 4.0 - 11.0 10e3/uL    RBC Count 2.67 (L) 4.40 - 5.90 10e6/uL    Hemoglobin 8.8 (L) 13.3 - 17.7 g/dL    Hematocrit 26.9 (L) 40.0 - 53.0 %     (H) 78 - 100 fL    MCH 33.0 26.5 - 33.0 pg    MCHC 32.7 31.5 - 36.5 g/dL    RDW 16.9 (H) 10.0 - 15.0 %    Platelet Count 30 (LL) 150 - 450 10e3/uL    NRBCs per 100 WBC 0 <1 /100    Absolute NRBCs 0.0 10e3/uL     Reviewed myeloma labs.  [place] light chains  Lab Results   Component Value Date    KFLCA 152.02 (H) 09/30/2022    KFLCA 87.74 (H) 09/08/2022    KFLCA 84.26 (H) 08/22/2022    KFLCA 108.57 (H) 07/14/2022    KFLCA 88.26 (H) 06/30/2022    KFLCA 29.02 (H) 05/25/2022    KFLCA 38.53 (H) 04/27/2022    KFLCA 100.90 (H) 04/06/2022    KFLCA 57.39 (H) 02/23/2022    KFLCA 56.66 (H) 01/26/2022     kappa/lambda ratio  Lab Results   Component Value Date    KLRA 844.56 (H) 09/30/2022    KLRA 516.12 (H) 09/08/2022    KLRA 443.47 (H) 08/22/2022    KLRA 517.00 (H) 07/14/2022    KLRA 519.18 (H) 06/30/2022    KLRA 207.29 (H) 05/25/2022    KLRA 256.87 (H) 04/27/2022    KLRA 347.93 (H) 04/06/2022    KLRA 92.56 (H) 02/23/2022    KLRA 106.91 (H) 01/26/2022   ]  Imaging    Radiologist Consult For Cardiology    Result Date: 10/4/2022  OVERREAD: DETAILED [place] RADIOLOGY EXTRACARDIAC OVERREAD OF CARDIAC CT LOCATION: [facility] DATE/TIME: 10/4/2022 1:19 PM INDICATION:  Aortic valve stenosis, etiology of cardiac valve disease unspecified, SAMANO (dyspnea on exertion), Angina at rest (H) TECHNIQUE: Dose reduction techniques were used. COMPARISON: 09/10/2022 FINDINGS:  LIMITED CHEST: Small right greater than left pleural effusions with atelectasis.  Left pleural effusions are partially  loculated. Low attenuation in the cardiac ventricles. LIMITED MEDIASTINUM: Right chest wall Port-A-Cath. LIMITED ABDOMEN: Atherosclerotic disease.  Nonobstructing 3 mm calculus at the lower pole of the right kidney. LIMITED PELVIS: Trace ascites. LIMITED MUSCULOSKELETAL: T6 vertebroplasty.     IMPRESSION:  1.  Right greater than left pleural effusions with atelectasis. Left pleural fluid is partially loculated. 2.  Low attenuation in the cardiac ventricles could be seen with anemia. 3.  Please refer to cardiologist's dictation for the cardiac CT report.    CT Chest Pulmonary Embolism w Contrast    Result Date: 9/10/2022  EXAM: CT CHEST PULMONARY EMBOLISM W CONTRAST LOCATION: [facility] DATE/TIME: 9/10/2022 8:26 PM INDICATION: Shortness of breath COMPARISON: PET/CT 08/22/2022 TECHNIQUE: CT chest pulmonary angiogram during arterial phase injection of IV contrast. Multiplanar reformats and MIP reconstructions were performed. Dose reduction techniques were used. CONTRAST: 75ml Isovue 370 FINDINGS: ANGIOGRAM CHEST: Pulmonary arteries are normal caliber and negative for pulmonary emboli. Mid ascending aorta is dilated measuring 4 cm in diameter, but does not meet size criteria for aneurysm. Considerable thickening/calcification of aortic valve leaflets. Mild arch and descending aorta atheromatous calcifications. No findings of acute aortic syndrome. LUNGS AND PLEURA: Symmetric lung inflation. No airspace opacities. Symmetric central airway wall thickening is present. No bronchiectasis. Minimal foci of subpleural atelectasis in the paradiaphragmatic left lower lobe. No pleural effusion. MEDIASTINUM: Chest port catheter terminates near the SVC right atrial junction. Mitral annular calcifications. Mild enlargement of the left atrium and left atrial appendage. No pericardial effusion. No lymphadenopathy in the chest. Esophagus is decompressed. CORONARY ARTERY CALCIFICATION: Severe. UPPER ABDOMEN:  Normal. MUSCULOSKELETAL: T6 compression deformity is moderate containing radiopaque cement from prior treatment. Generalized bone demineralization. No other compression deformities. Multilevel disc space calcifications are present. Small lytic lesion at the base  of the right scapular spine (series 6, image 50. No new lytic lesions in the chest.     IMPRESSION: 1.  No acute pulmonary embolism. 2.  Calcific aortic stenosis. Minimal enlargement of the mid ascending aorta measuring 4 cm. 3.  Stable lytic lesion at the base of the right scapular spine consistent with known diagnosis of multiple myeloma.    Total time spent with patient in face to face time, chart review and documentation was 45 minutes.  Discussed with Dr. Clifford      Signed by: CECELIA Elaine CNP   medically, orthopedically, and neurovascularly stable for discharge

## 2023-06-21 ENCOUNTER — INPATIENT (INPATIENT)
Facility: HOSPITAL | Age: 78
LOS: 4 days | Discharge: NURSING, REHAB OR LONG-TERM CARE | DRG: 73 | End: 2023-06-26
Attending: GENERAL ACUTE CARE HOSPITAL | Admitting: FAMILY MEDICINE
Payer: MEDICARE

## 2023-06-21 VITALS
OXYGEN SATURATION: 97 % | HEART RATE: 67 BPM | DIASTOLIC BLOOD PRESSURE: 69 MMHG | SYSTOLIC BLOOD PRESSURE: 167 MMHG | HEIGHT: 70 IN | RESPIRATION RATE: 16 BRPM

## 2023-06-21 DIAGNOSIS — Z98.61 CORONARY ANGIOPLASTY STATUS: Chronic | ICD-10-CM

## 2023-06-21 DIAGNOSIS — Z96.652 PRESENCE OF LEFT ARTIFICIAL KNEE JOINT: Chronic | ICD-10-CM

## 2023-06-21 DIAGNOSIS — G45.9 TRANSIENT CEREBRAL ISCHEMIC ATTACK, UNSPECIFIED: ICD-10-CM

## 2023-06-21 DIAGNOSIS — Z98.890 OTHER SPECIFIED POSTPROCEDURAL STATES: Chronic | ICD-10-CM

## 2023-06-21 DIAGNOSIS — Z95.9 PRESENCE OF CARDIAC AND VASCULAR IMPLANT AND GRAFT, UNSPECIFIED: Chronic | ICD-10-CM

## 2023-06-21 LAB
ALBUMIN SERPL ELPH-MCNC: 4.3 G/DL — SIGNIFICANT CHANGE UP (ref 3.3–5.2)
ALP SERPL-CCNC: 90 U/L — SIGNIFICANT CHANGE UP (ref 40–120)
ALT FLD-CCNC: 12 U/L — SIGNIFICANT CHANGE UP
ANION GAP SERPL CALC-SCNC: 10 MMOL/L — SIGNIFICANT CHANGE UP (ref 5–17)
APTT BLD: 30.8 SEC — SIGNIFICANT CHANGE UP (ref 27.5–35.5)
AST SERPL-CCNC: 17 U/L — SIGNIFICANT CHANGE UP
BASOPHILS # BLD AUTO: 0.03 K/UL — SIGNIFICANT CHANGE UP (ref 0–0.2)
BASOPHILS NFR BLD AUTO: 0.3 % — SIGNIFICANT CHANGE UP (ref 0–2)
BILIRUB SERPL-MCNC: 0.4 MG/DL — SIGNIFICANT CHANGE UP (ref 0.4–2)
BUN SERPL-MCNC: 20.4 MG/DL — HIGH (ref 8–20)
CALCIUM SERPL-MCNC: 9.5 MG/DL — SIGNIFICANT CHANGE UP (ref 8.4–10.5)
CHLORIDE SERPL-SCNC: 107 MMOL/L — SIGNIFICANT CHANGE UP (ref 96–108)
CK SERPL-CCNC: 88 U/L — SIGNIFICANT CHANGE UP (ref 30–200)
CO2 SERPL-SCNC: 30 MMOL/L — HIGH (ref 22–29)
CREAT SERPL-MCNC: 0.81 MG/DL — SIGNIFICANT CHANGE UP (ref 0.5–1.3)
EGFR: 92 ML/MIN/1.73M2 — SIGNIFICANT CHANGE UP
EOSINOPHIL # BLD AUTO: 0.05 K/UL — SIGNIFICANT CHANGE UP (ref 0–0.5)
EOSINOPHIL NFR BLD AUTO: 0.5 % — SIGNIFICANT CHANGE UP (ref 0–6)
GLUCOSE SERPL-MCNC: 100 MG/DL — HIGH (ref 70–99)
HCT VFR BLD CALC: 42.6 % — SIGNIFICANT CHANGE UP (ref 39–50)
HGB BLD-MCNC: 14.2 G/DL — SIGNIFICANT CHANGE UP (ref 13–17)
IMM GRANULOCYTES NFR BLD AUTO: 0.3 % — SIGNIFICANT CHANGE UP (ref 0–0.9)
INR BLD: 1.07 RATIO — SIGNIFICANT CHANGE UP (ref 0.88–1.16)
LYMPHOCYTES # BLD AUTO: 2.66 K/UL — SIGNIFICANT CHANGE UP (ref 1–3.3)
LYMPHOCYTES # BLD AUTO: 26.4 % — SIGNIFICANT CHANGE UP (ref 13–44)
MCHC RBC-ENTMCNC: 30 PG — SIGNIFICANT CHANGE UP (ref 27–34)
MCHC RBC-ENTMCNC: 33.3 GM/DL — SIGNIFICANT CHANGE UP (ref 32–36)
MCV RBC AUTO: 89.9 FL — SIGNIFICANT CHANGE UP (ref 80–100)
MONOCYTES # BLD AUTO: 0.63 K/UL — SIGNIFICANT CHANGE UP (ref 0–0.9)
MONOCYTES NFR BLD AUTO: 6.2 % — SIGNIFICANT CHANGE UP (ref 2–14)
NEUTROPHILS # BLD AUTO: 6.69 K/UL — SIGNIFICANT CHANGE UP (ref 1.8–7.4)
NEUTROPHILS NFR BLD AUTO: 66.3 % — SIGNIFICANT CHANGE UP (ref 43–77)
PLATELET # BLD AUTO: 184 K/UL — SIGNIFICANT CHANGE UP (ref 150–400)
POTASSIUM SERPL-MCNC: 4.3 MMOL/L — SIGNIFICANT CHANGE UP (ref 3.5–5.3)
POTASSIUM SERPL-SCNC: 4.3 MMOL/L — SIGNIFICANT CHANGE UP (ref 3.5–5.3)
PROT SERPL-MCNC: 7.2 G/DL — SIGNIFICANT CHANGE UP (ref 6.6–8.7)
PROTHROM AB SERPL-ACNC: 12.4 SEC — SIGNIFICANT CHANGE UP (ref 10.5–13.4)
RBC # BLD: 4.74 M/UL — SIGNIFICANT CHANGE UP (ref 4.2–5.8)
RBC # FLD: 13.5 % — SIGNIFICANT CHANGE UP (ref 10.3–14.5)
SODIUM SERPL-SCNC: 147 MMOL/L — HIGH (ref 135–145)
TROPONIN T SERPL-MCNC: <0.01 NG/ML — SIGNIFICANT CHANGE UP (ref 0–0.06)
WBC # BLD: 10.09 K/UL — SIGNIFICANT CHANGE UP (ref 3.8–10.5)
WBC # FLD AUTO: 10.09 K/UL — SIGNIFICANT CHANGE UP (ref 3.8–10.5)

## 2023-06-21 PROCEDURE — 70496 CT ANGIOGRAPHY HEAD: CPT | Mod: 26,MA

## 2023-06-21 PROCEDURE — 71045 X-RAY EXAM CHEST 1 VIEW: CPT | Mod: 26

## 2023-06-21 PROCEDURE — 93010 ELECTROCARDIOGRAM REPORT: CPT

## 2023-06-21 PROCEDURE — 70498 CT ANGIOGRAPHY NECK: CPT | Mod: 26,MA

## 2023-06-21 PROCEDURE — 99285 EMERGENCY DEPT VISIT HI MDM: CPT

## 2023-06-21 PROCEDURE — 0042T: CPT

## 2023-06-21 PROCEDURE — 99223 1ST HOSP IP/OBS HIGH 75: CPT

## 2023-06-21 RX ORDER — CIPROFLOXACIN HCL 0.3 %
1 DROPS OPHTHALMIC (EYE) EVERY 8 HOURS
Refills: 0 | Status: COMPLETED | OUTPATIENT
Start: 2023-06-21 | End: 2023-06-26

## 2023-06-21 RX ORDER — ASPIRIN/CALCIUM CARB/MAGNESIUM 324 MG
300 TABLET ORAL DAILY
Refills: 0 | Status: DISCONTINUED | OUTPATIENT
Start: 2023-06-21 | End: 2023-06-22

## 2023-06-21 RX ORDER — HALOPERIDOL DECANOATE 100 MG/ML
3 INJECTION INTRAMUSCULAR ONCE
Refills: 0 | Status: COMPLETED | OUTPATIENT
Start: 2023-06-21 | End: 2023-06-21

## 2023-06-21 RX ORDER — HALOPERIDOL DECANOATE 100 MG/ML
2.5 INJECTION INTRAMUSCULAR EVERY 6 HOURS
Refills: 0 | Status: DISCONTINUED | OUTPATIENT
Start: 2023-06-21 | End: 2023-06-24

## 2023-06-21 RX ORDER — ACETAMINOPHEN 500 MG
650 TABLET ORAL EVERY 6 HOURS
Refills: 0 | Status: DISCONTINUED | OUTPATIENT
Start: 2023-06-21 | End: 2023-06-26

## 2023-06-21 RX ORDER — ONDANSETRON 8 MG/1
4 TABLET, FILM COATED ORAL EVERY 6 HOURS
Refills: 0 | Status: DISCONTINUED | OUTPATIENT
Start: 2023-06-21 | End: 2023-06-26

## 2023-06-21 RX ORDER — SODIUM CHLORIDE 9 MG/ML
1000 INJECTION, SOLUTION INTRAVENOUS
Refills: 0 | Status: DISCONTINUED | OUTPATIENT
Start: 2023-06-21 | End: 2023-06-24

## 2023-06-21 RX ORDER — MIDAZOLAM HYDROCHLORIDE 1 MG/ML
5 INJECTION, SOLUTION INTRAMUSCULAR; INTRAVENOUS ONCE
Refills: 0 | Status: DISCONTINUED | OUTPATIENT
Start: 2023-06-21 | End: 2023-06-21

## 2023-06-21 RX ORDER — LEVOTHYROXINE SODIUM 125 MCG
37.5 TABLET ORAL AT BEDTIME
Refills: 0 | Status: DISCONTINUED | OUTPATIENT
Start: 2023-06-21 | End: 2023-06-23

## 2023-06-21 RX ADMIN — SODIUM CHLORIDE 75 MILLILITER(S): 9 INJECTION, SOLUTION INTRAVENOUS at 22:10

## 2023-06-21 RX ADMIN — Medication 300 MILLIGRAM(S): at 22:40

## 2023-06-21 RX ADMIN — MIDAZOLAM HYDROCHLORIDE 5 MILLIGRAM(S): 1 INJECTION, SOLUTION INTRAMUSCULAR; INTRAVENOUS at 14:17

## 2023-06-21 RX ADMIN — Medication 1 DROP(S): at 22:38

## 2023-06-21 RX ADMIN — HALOPERIDOL DECANOATE 3 MILLIGRAM(S): 100 INJECTION INTRAMUSCULAR at 18:28

## 2023-06-21 RX ADMIN — Medication 37.5 MICROGRAM(S): at 22:38

## 2023-06-21 NOTE — H&P ADULT - ASSESSMENT
INCOMPLETE 77 year old male with history of Dementia, PAD s/p LE Stents x 2, HTN (diet controlled), HLD, Hypothyroidism and BPH brought by EMS with right facial droop. History was taken from wife at bedside as patient is unable to provide any information due to his baseline dementia. Per wife, patient was doing well until this morning when she went to visit him and noticed his right eye was drooping. Few minutes later she noticed right facial droop as well so she called the NH staff who confirmed that these are new changes so EMS was called. He does not speak much but there was no change from baseline. No arm/leg weakness. He had breakfast this morning with no difficulty swallowing.  In ER, he was Code Stroke. NIH 1. Not a candidate for Tenecteplase. CT Scans with no acute findings.     1) R/O Stroke  - Stroke protocol  - MRI Brain   - ECHO  - Lipid Profile and HbA1c   - PT/OT  - Aspirin Suppository   - Will start Lipitor once passes dysphagia screen (failed earlier today)   - Permissive HTN    2) Hypernatremia  - Gentle hydration     3) PAD / HLD / HTN  - Will resume Plavix and Statins once starts PO    4) Hypothyroidism  - Levothyroxine 37.5 IVP daily     5) BPH  - Will resume Flomax once starts PO    6) Dementia   - Will resume Memantine once starts PO    DVT Prophylaxis -- Venodyne    Dispo: Vladislav Valadez in 48 hours.

## 2023-06-21 NOTE — H&P ADULT - NSHPLABSRESULTS_GEN_ALL_CORE
LABS:                        14.2   10.09 )-----------( 184      ( 21 Jun 2023 13:45 )             42.6     06-21    147<H>  |  107  |  20.4<H>  ----------------------------<  100<H>  4.3   |  30.0<H>  |  0.81    Ca    9.5      21 Jun 2023 13:45    TPro  7.2  /  Alb  4.3  /  TBili  0.4  /  DBili  x   /  AST  17  /  ALT  12  /  AlkPhos  90  06-21    PT/INR - ( 21 Jun 2023 13:45 )   PT: 12.4 sec;   INR: 1.07 ratio       PTT - ( 21 Jun 2023 13:45 )  PTT:30.8 sec  CARDIAC MARKERS ( 21 Jun 2023 13:45 )  x     / <0.01 ng/mL / 88 U/L / x     / x        ACC: [id number] EXAM:  CT PERFUSION W MAPS IC   ORDERED BY: RODRICK HYDE     ACC: [id number] EXAM:  CT ANGIO BRAIN (W)AW IC   ORDERED BY: RODRICK HYDE     ACC: [id number] EXAM:  CT BRAIN STROKE PROTOCOL   ORDERED BY: RODRICK HYDE     ACC: [id number] EXAM:  CT ANGIO NECK (W)AW IC   ORDERED BY: RODRICK HYDE     No acute intracranial hemorrhage.    Age-indeterminate lacunar infarct posterior limb right internal capsule.   Artifact versus questionable loss of gray-white matter differentiation in the bilateral occipital lobes. MRI exam recommended to exclude acute ischemia.   ER called at 2:00 PM and 2:02 PM on 6/21/2023. Dr. Hyde   notified at 2:04 PM    Unremarkable CT perfusion    No hemodynamically significant stenosis LABS:                        14.2   10.09 )-----------( 184      ( 21 Jun 2023 13:45 )             42.6     06-21    147<H>  |  107  |  20.4<H>  ----------------------------<  100<H>  4.3   |  30.0<H>  |  0.81    Ca    9.5      21 Jun 2023 13:45    TPro  7.2  /  Alb  4.3  /  TBili  0.4  /  DBili  x   /  AST  17  /  ALT  12  /  AlkPhos  90  06-21    PT/INR - ( 21 Jun 2023 13:45 )   PT: 12.4 sec;   INR: 1.07 ratio       PTT - ( 21 Jun 2023 13:45 )  PTT:30.8 sec  CARDIAC MARKERS ( 21 Jun 2023 13:45 )  x     / <0.01 ng/mL / 88 U/L / x     / x        ACC: [id number] EXAM:  CT PERFUSION W MAPS IC   ORDERED BY: RODRICK HYDE     ACC: [id number] EXAM:  CT ANGIO BRAIN (W)AW IC   ORDERED BY: RODRICK HYDE     ACC: [id number] EXAM:  CT BRAIN STROKE PROTOCOL   ORDERED BY: RODRICK HYDE     ACC: [id number] EXAM:  CT ANGIO NECK (W)AW IC   ORDERED BY: RODRICK HYDE     No acute intracranial hemorrhage.    Age-indeterminate lacunar infarct posterior limb right internal capsule.   Artifact versus questionable loss of gray-white matter differentiation in the bilateral occipital lobes. MRI exam recommended to exclude acute ischemia.   ER called at 2:00 PM and 2:02 PM on 6/21/2023. Dr. Hyde   notified at 2:04 PM    Unremarkable CT perfusion    No hemodynamically significant stenosis LABS:                        14.2   10.09 )-----------( 184      ( 21 Jun 2023 13:45 )             42.6     06-21    147<H>  |  107  |  20.4<H>  ----------------------------<  100<H>  4.3   |  30.0<H>  |  0.81    Ca    9.5      21 Jun 2023 13:45    TPro  7.2  /  Alb  4.3  /  TBili  0.4  /  DBili  x   /  AST  17  /  ALT  12  /  AlkPhos  90  06-21    PT/INR - ( 21 Jun 2023 13:45 )   PT: 12.4 sec;   INR: 1.07 ratio       PTT - ( 21 Jun 2023 13:45 )  PTT:30.8 sec  CARDIAC MARKERS ( 21 Jun 2023 13:45 )  x     / <0.01 ng/mL / 88 U/L / x     / x        ACC: [id number] EXAM:  CT PERFUSION W MAPS IC   ORDERED BY: RODRICK HYDE     ACC: [id number] EXAM:  CT ANGIO BRAIN (W)AW IC   ORDERED BY: RODRICK HYDE     ACC: [id number] EXAM:  CT BRAIN STROKE PROTOCOL   ORDERED BY: RODRICK HYDE     ACC: [id number] EXAM:  CT ANGIO NECK (W)AW IC   ORDERED BY: RODRICK HYDE     No acute intracranial hemorrhage.    Age-indeterminate lacunar infarct posterior limb right internal capsule.   Artifact versus questionable loss of gray-white matter differentiation in the bilateral occipital lobes. MRI exam recommended to exclude acute ischemia.   ER called at 2:00 PM and 2:02 PM on 6/21/2023. Dr. Hyde   notified at 2:04 PM    Unremarkable CT perfusion    No hemodynamically significant stenosis

## 2023-06-21 NOTE — PHARMACOTHERAPY INTERVENTION NOTE - COMMENTS
Referenced facility records to obtain medication list. Outpatient Medication Review updated.    HOME MEDICATIONS:  bisacodyl 10 mg rectal suppository: 1 suppository(ies) rectally once a day as needed for  constipation (21 Jun 2023 15:25)  clopidogrel 75 mg oral tablet: 1 tab(s) orally once a day (21 Jun 2023 15:25)  escitalopram 5 mg oral tablet: 0.5 tab(s) orally once a day (in the morning) (21 Jun 2023 15:25)  Fleet Enema 19 g-7 g rectal enema: 133 milliliter(s) rectally once a day as needed for  constipation (21 Jun 2023 15:26)  levothyroxine 75 mcg (0.075 mg) oral tablet: 1 tab(s) orally once a day (21 Jun 2023 15:25)  memantine 10 mg oral tablet: 1 tab(s) orally once a day (21 Jun 2023 15:25)  memantine 5 mg oral tablet: 1 tab(s) orally once a day (at bedtime) (21 Jun 2023 15:27)  rosuvastatin 20 mg oral capsule: 1 tab(s) orally once a day (21 Jun 2023 15:28)  tamsulosin 0.4 mg oral capsule: 1 cap(s) orally once a day (21 Jun 2023 15:25)  Triple Antibiotic topical ointment: Apply topically to affected area to R and L heel fissures every shift (21 Jun 2023 15:25)  
Stroke

## 2023-06-21 NOTE — PATIENT PROFILE ADULT - FALL HARM RISK - HARM RISK INTERVENTIONS
Assistance with ambulation/Assistance OOB with selected safe patient handling equipment/Communicate Risk of Fall with Harm to all staff/Discuss with provider need for PT consult/Monitor for mental status changes/Monitor gait and stability/Move patient closer to nurses' station/Provide patient with walking aids - walker, cane, crutches/Reinforce activity limits and safety measures with patient and family/Reorient to person, place and time as needed/Tailored Fall Risk Interventions/Toileting schedule using arm’s reach rule for commode and bathroom/Use of alarms - bed, chair and/or voice tab/Visual Cue: Yellow wristband and red socks/Bed in lowest position, wheels locked, appropriate side rails in place/Call bell, personal items and telephone in reach/Instruct patient to call for assistance before getting out of bed or chair/Non-slip footwear when patient is out of bed/[place] to call system/Physically safe environment - no spills, clutter or unnecessary equipment/Purposeful Proactive Rounding/Room/bathroom lighting operational, light cord in reach Assistance with ambulation/Assistance OOB with selected safe patient handling equipment/Communicate Risk of Fall with Harm to all staff/Discuss with provider need for PT consult/Monitor for mental status changes/Monitor gait and stability/Move patient closer to nurses' station/Provide patient with walking aids - walker, cane, crutches/Reinforce activity limits and safety measures with patient and family/Reorient to person, place and time as needed/Tailored Fall Risk Interventions/Toileting schedule using arm’s reach rule for commode and bathroom/Use of alarms - bed, chair and/or voice tab/Visual Cue: Yellow wristband and red socks/Bed in lowest position, wheels locked, appropriate side rails in place/Call bell, personal items and telephone in reach/Instruct patient to call for assistance before getting out of bed or chair/Non-slip footwear when patient is out of bed/[place] to call system/Physically safe environment - no spills, clutter or unnecessary equipment/Purposeful Proactive Rounding/Room/bathroom lighting operational, light cord in reach Assistance with ambulation/Assistance OOB with selected safe patient handling equipment/Communicate Risk of Fall with Harm to all staff/Discuss with provider need for PT consult/Monitor for mental status changes/Monitor gait and stability/Move patient closer to nurses' station/Provide patient with walking aids - walker, cane, crutches/Reinforce activity limits and safety measures with patient and family/Reorient to person, place and time as needed/Tailored Fall Risk Interventions/Toileting schedule using arm’s reach rule for commode and bathroom/Use of alarms - bed, chair and/or voice tab/Visual Cue: Yellow wristband and red socks/Bed in lowest position, wheels locked, appropriate side rails in place/Call bell, personal items and telephone in reach/Instruct patient to call for assistance before getting out of bed or chair/Non-slip footwear when patient is out of bed/[place] to call system/Physically safe environment - no spills, clutter or unnecessary equipment/Purposeful Proactive Rounding/Room/bathroom lighting operational, light cord in reach

## 2023-06-21 NOTE — ED ADULT NURSE REASSESSMENT NOTE - NS ED NURSE REASSESS COMMENT FT1
pt restless and agitated in stretcher pt keeps trying to get up and pulling at lines, called MD Vazquez pt medicated as ordered and 1:1 placed, pt A&Ox0, not following commands, resp even and unlabored no  distress noted, bed in lowest position and side rails up pt restless and agitated in stretcher pt keeps trying to get up and pulling at lines, called MD Vazquez pt medicated as ordered and 1:1 placed, pt A&Ox0, not following most  commands, resp even and unlabored no  distress noted, bed in lowest position and side rails up

## 2023-06-21 NOTE — ED ADULT NURSE REASSESSMENT NOTE - NS ED NURSE REASSESS COMMENT FT1
Report given to PAZ Ravi: pt. A&Ox0, baseline mental status. No noted neuro deficits at this time. Resp. equal and unlabored b/l. VSS. NAD. Comfort measures provided, safety measures implemented. PCA at bedside for CO.

## 2023-06-21 NOTE — H&P ADULT - HISTORY OF PRESENT ILLNESS
INCOMPLETE 77 year old male with history of Dementia, PAD s/p LE Stents x 2, HTN (diet controlled), HLD, Hypothyroidism and BPH brought by EMS with right facial droop. History was taken from wife at bedside as patient is unable to provide any information due to his baseline dementia. Per wife, patient was doing well until this morning when she went to visit him and noticed his right eye was drooping. Few minutes later she noticed right facial droop as well so she called the NH staff who confirmed that these are new changes so EMS was called. He does not speak much but there was no change from baseline. No arm/leg weakness. He had breakfast this morning with no difficulty swallowing.  In ER, he was Code Stroke. NIH 1. Not a candidate for Tenecteplase. CT Scans with no acute findings.

## 2023-06-21 NOTE — H&P ADULT - NSICDXPASTMEDICALHX_GEN_ALL_CORE_FT
PAST MEDICAL HISTORY:  Dementia     HLD (hyperlipidemia)     HTN (hypertension)     PAD (peripheral artery disease)

## 2023-06-21 NOTE — H&P ADULT - NSHPSOCIALHISTORY_GEN_ALL_CORE
Resident of [facility].  No smoking, alcohol or illicit drug use. Resident of [facility].  No smoking, alcohol or illicit drug use. Resident of [facility].  No smoking, alcohol or illicit drug use.

## 2023-06-21 NOTE — ED ADULT NURSE REASSESSMENT NOTE - NS ED NURSE REASSESS COMMENT FT1
Assumed care of pt from PAZ Olivera: Pt. resting cam and comfortable in stretcher. A&Ox0, which is pt baseline. PERRLA. No notable neuro deficits at this time. EKG obtained and given to attending MD. VSS as documented in flow sheet. Pt. on constant observation for safety due to worsening dementia and sundowning. Scene maintained for safety. PCA at bedside within arms reach.

## 2023-06-21 NOTE — ED PROVIDER NOTE - ATTENDING APP SHARED VISIT CONTRIBUTION OF CARE
Pt is a 78 yo M sent from [facility] for ams, facial droop and slurred speech. LKW 1130 AM.  Pt's symptoms improved en route. Pt unable to give clear hx.    physical - rrr. ctab. abd - soft, nt. no edema. no rash. mild R facial droop. moving all extremities equally. agitated and uncooperative.    plan - labs and imaging reviewed.  will admit to medicine for further eval. Pt is a 76 yo M sent from [facility] for ams, facial droop and slurred speech. LKW 1130 AM.  Pt's symptoms improved en route. Pt unable to give clear hx.    physical - rrr. ctab. abd - soft, nt. no edema. no rash. mild R facial droop. moving all extremities equally. agitated and uncooperative.    plan - labs and imaging reviewed.  will admit to medicine for further eval.

## 2023-06-21 NOTE — H&P ADULT - NSHPPHYSICALEXAM_GEN_ALL_CORE
Vital Signs   HR: 53 (21 Jun 2023 14:23) (53 - 67)  BP: 128/58 (21 Jun 2023 14:23) (128/58 - 167/69)  RR: 18 (21 Jun 2023 14:23) (16 - 18)  SpO2: 100% (21 Jun 2023 14:23) (97% - 100%)  Parameters below as of 21 Jun 2023 14:23  Patient On (Oxygen Delivery Method): room air  General: Elderly male lying in bed comfortably. No acute distress  HEENT: PERRLA. EOMI. Conjunctival injection on right side. Moist mucus membrane  Neck: Supple.   Chest: CTA bilaterally - no wheezing, rales or rhonchi.   Heart: Normal S1 & S2 with RRR.   Abdomen: Non distended. Soft. Non-tender. + BS  Ext: No pedal edema. No calf tenderness   Neuro: Awake. Following commands intermittently. Right facial droop. Motor 5/5 in all extremities. Sensation intact. Reflexes 1+. Baseline aphasia.   Skin: Warm and Dry  Psychiatry: Normal mood and affect

## 2023-06-21 NOTE — ED PROVIDER NOTE - CLINICAL SUMMARY MEDICAL DECISION MAKING FREE TEXT BOX
ASSESSMENT:   CARLITOS LOPEZ is a 78yo M who presented with symptoms concerning for stroke, improving on arrival and now w/ only mild facial droop. Vitals stable.     PLAN: Code stroke called. PT w/ negative CTs. Family refusing TNK in setting of mild, improving symptoms but requesting MRI. Will admit for further work-up. ASSESSMENT:   CARLITOS LOPEZ is a 76yo M who presented with symptoms concerning for stroke, improving on arrival and now w/ only mild facial droop. Vitals stable.     PLAN: Code stroke called. PT w/ negative CTs. Family refusing TNK in setting of mild, improving symptoms but requesting MRI. Will admit for further work-up.

## 2023-06-21 NOTE — ED PROVIDER NOTE - PHYSICAL EXAMINATION
PT w/ dementia and not participating in exam.     CONSTITUTIONAL: appears to be w/o acute distress  SKIN: Warm, dry, no rash.  HEAD: Normocephalic, atraumatic.  EYES: pupils equal, 3mm, conjunctiva and sclera clear.  CARD: Regular rate and rhythm.   RESP: No wheezes, rales or rhonchi.   ABD:  soft, non-distended, apparently not tender.   MSK:  Good ROM in upper/lower extremities +Good strength against gravity in upper/lowers. Strength equal bilaterally.   NEURO: Alert. Not answer orientation questions. +MILD RIGHT SIDED FACIAL DROOP. No aphasia, no slurring.

## 2023-06-21 NOTE — ED PROVIDER NOTE - OBJECTIVE STATEMENT
CARLITOS LOPEZ is a 76yo Male with unknown PMH who presents as a code stroke from nursing home. PT was noted to be w/ right sided facial asymmetry, slurring of his words about thirty minutes prior to arrival. Staff saw him well at 11:30 am this morning. ON arrival to the ED pt w/ mild right sided facial drop and speaking clearly. PT minimally following commands and not answering questions. History of dementia at baseline. Code stroke called and pt taken to scanner. CARLITOS LOPEZ is a 78yo Male with unknown PMH who presents as a code stroke from nursing home. PT was noted to be w/ right sided facial asymmetry, slurring of his words about thirty minutes prior to arrival. Staff saw him well at 11:30 am this morning. ON arrival to the ED pt w/ mild right sided facial drop and speaking clearly. PT minimally following commands and not answering questions. History of dementia at baseline. Code stroke called and pt taken to scanner.

## 2023-06-21 NOTE — ED ADULT TRIAGE NOTE - NS ED NURSE AMBULANCES
[facility]

## 2023-06-21 NOTE — ED PROVIDER NOTE - NS ED ATTENDING STATEMENT MOD
This was a shared visit with the NICOLE. I reviewed and verified the documentation and independently performed the documented: Attending with

## 2023-06-22 LAB
A1C WITH ESTIMATED AVERAGE GLUCOSE RESULT: 6 % — HIGH (ref 4–5.6)
ANION GAP SERPL CALC-SCNC: 8 MMOL/L — SIGNIFICANT CHANGE UP (ref 5–17)
APPEARANCE UR: ABNORMAL
BACTERIA # UR AUTO: ABNORMAL
BILIRUB UR-MCNC: NEGATIVE — SIGNIFICANT CHANGE UP
BUN SERPL-MCNC: 16.2 MG/DL — SIGNIFICANT CHANGE UP (ref 8–20)
CALCIUM SERPL-MCNC: 8.8 MG/DL — SIGNIFICANT CHANGE UP (ref 8.4–10.5)
CHLORIDE SERPL-SCNC: 108 MMOL/L — SIGNIFICANT CHANGE UP (ref 96–108)
CHOLEST SERPL-MCNC: 105 MG/DL — SIGNIFICANT CHANGE UP
CO2 SERPL-SCNC: 29 MMOL/L — SIGNIFICANT CHANGE UP (ref 22–29)
COLOR SPEC: YELLOW — SIGNIFICANT CHANGE UP
CREAT SERPL-MCNC: 0.78 MG/DL — SIGNIFICANT CHANGE UP (ref 0.5–1.3)
DIFF PNL FLD: ABNORMAL
EGFR: 93 ML/MIN/1.73M2 — SIGNIFICANT CHANGE UP
EPI CELLS # UR: SIGNIFICANT CHANGE UP
ESTIMATED AVERAGE GLUCOSE: 126 MG/DL — HIGH (ref 68–114)
GLUCOSE SERPL-MCNC: 102 MG/DL — HIGH (ref 70–99)
GLUCOSE UR QL: NEGATIVE — SIGNIFICANT CHANGE UP
HCV AB S/CO SERPL IA: 0.29 S/CO — SIGNIFICANT CHANGE UP (ref 0–0.99)
HCV AB SERPL-IMP: SIGNIFICANT CHANGE UP
HDLC SERPL-MCNC: 44 MG/DL — SIGNIFICANT CHANGE UP
KETONES UR-MCNC: NEGATIVE — SIGNIFICANT CHANGE UP
LEUKOCYTE ESTERASE UR-ACNC: ABNORMAL
LIPID PNL WITH DIRECT LDL SERPL: 40 MG/DL — SIGNIFICANT CHANGE UP
MAGNESIUM SERPL-MCNC: 2.2 MG/DL — SIGNIFICANT CHANGE UP (ref 1.6–2.6)
NITRITE UR-MCNC: POSITIVE
NON HDL CHOLESTEROL: 61 MG/DL — SIGNIFICANT CHANGE UP
PH UR: 6 — SIGNIFICANT CHANGE UP (ref 5–8)
POTASSIUM SERPL-MCNC: 3.7 MMOL/L — SIGNIFICANT CHANGE UP (ref 3.5–5.3)
POTASSIUM SERPL-SCNC: 3.7 MMOL/L — SIGNIFICANT CHANGE UP (ref 3.5–5.3)
PROT UR-MCNC: 15
RBC CASTS # UR COMP ASSIST: ABNORMAL /HPF (ref 0–4)
SODIUM SERPL-SCNC: 145 MMOL/L — SIGNIFICANT CHANGE UP (ref 135–145)
SP GR SPEC: 1.01 — SIGNIFICANT CHANGE UP (ref 1.01–1.02)
TRIGL SERPL-MCNC: 104 MG/DL — SIGNIFICANT CHANGE UP
UROBILINOGEN FLD QL: 1
WBC UR QL: >50 /HPF (ref 0–5)

## 2023-06-22 PROCEDURE — 99232 SBSQ HOSP IP/OBS MODERATE 35: CPT

## 2023-06-22 RX ORDER — CEFTRIAXONE 500 MG/1
1000 INJECTION, POWDER, FOR SOLUTION INTRAMUSCULAR; INTRAVENOUS EVERY 24 HOURS
Refills: 0 | Status: DISCONTINUED | OUTPATIENT
Start: 2023-06-22 | End: 2023-06-26

## 2023-06-22 RX ORDER — ASPIRIN/CALCIUM CARB/MAGNESIUM 324 MG
81 TABLET ORAL DAILY
Refills: 0 | Status: DISCONTINUED | OUTPATIENT
Start: 2023-06-22 | End: 2023-06-26

## 2023-06-22 RX ORDER — CEFTRIAXONE 500 MG/1
1000 INJECTION, POWDER, FOR SOLUTION INTRAMUSCULAR; INTRAVENOUS EVERY 24 HOURS
Refills: 0 | Status: DISCONTINUED | OUTPATIENT
Start: 2023-06-22 | End: 2023-06-22

## 2023-06-22 RX ADMIN — Medication 1 DROP(S): at 14:35

## 2023-06-22 RX ADMIN — CEFTRIAXONE 1000 MILLIGRAM(S): 500 INJECTION, POWDER, FOR SOLUTION INTRAMUSCULAR; INTRAVENOUS at 06:13

## 2023-06-22 RX ADMIN — Medication 1 DROP(S): at 21:19

## 2023-06-22 RX ADMIN — Medication 81 MILLIGRAM(S): at 10:59

## 2023-06-22 RX ADMIN — Medication 37.5 MICROGRAM(S): at 21:19

## 2023-06-22 RX ADMIN — Medication 1 DROP(S): at 05:27

## 2023-06-22 NOTE — SWALLOW BEDSIDE ASSESSMENT ADULT - COMMENTS
As per MD note: "77 year old male with history of Dementia, PAD s/p LE Stents x 2, HTN (diet controlled), HLD, Hypothyroidism and BPH brought by EMS with right facial droop. History was taken from wife at bedside as patient is unable to provide any information due to his baseline dementia. Per wife, patient was doing well until this morning when she went to visit him and noticed his right eye was drooping. Few minutes later she noticed right facial droop as well so she called the NH staff who confirmed that these are new changes so EMS was called. He does not speak much but there was no change from baseline. No arm/leg weakness. He had breakfast this morning with no difficulty swallowing.  In ER, he was Code Stroke. NIH 1. Not a candidate for Tenecteplase. CT Scans with no acute findings."

## 2023-06-22 NOTE — CHART NOTE - NSCHARTNOTEFT_GEN_A_CORE
Urinalysis Basic - ( 2023 04:00 )    Color: Yellow /   Appearance: Slightly Turbid /   S.015 / pH: x  Gluc: x / Ketone: Negative  /   Bili: Negative / Urobili: 1   Blood: x / Protein: 15 /   Nitrite: Positive   Leuk Esterase: Moderate /   RBC: 25-50 /HPF /   WBC >50 /HPF   Sq Epi: x / Non Sq Epi: x /   Bacteria: Moderate    Impression: 1- UTI  Plan: 1-urine culture          2- Ceftriaxone ordered          3- Dr. Chan aware

## 2023-06-22 NOTE — SWALLOW BEDSIDE ASSESSMENT ADULT - DIET PRIOR TO ADMI
Regular solids, thin fluids as per transfer paperwork from [facility] Regular solids, thin fluids as per transfer paperwork from [facility] Regular solids, thin fluids as per transfer paperwork from [facility]

## 2023-06-22 NOTE — OCCUPATIONAL THERAPY INITIAL EVALUATION ADULT - PERTINENT HX OF CURRENT PROBLEM, REHAB EVAL
As per MD note: 77 year old male with history of Dementia, PAD s/p LE Stents x 2, HTN (diet controlled), HLD, Hypothyroidism and BPH brought by EMS with right facial droop. History was taken from wife at bedside as patient is unable to provide any information due to his baseline dementia. Per wife, patient was doing well until this morning when she went to visit him and noticed his right eye was drooping. Few minutes later she noticed right facial droop as well so she called the NH staff who confirmed that these are new changes so EMS was called. He does not speak much but there was no change from baseline. No arm/leg weakness. He had breakfast this morning with no difficulty swallowing.

## 2023-06-22 NOTE — SWALLOW BEDSIDE ASSESSMENT ADULT - SLP GENERAL OBSERVATIONS
Pt received & seen seated upright in bed, awake/alert, 1:1 present, 02 NC, confused with reduced cognition, 0/10 pain pre/post

## 2023-06-22 NOTE — PROGRESS NOTE ADULT - SUBJECTIVE AND OBJECTIVE BOX
cc: r/o cva , hypernatremia       interval hx: patient seen and eval.a wake, alert x 0-1 , comfortable, not following commands,    Vital Signs Last 24 Hrs  T(C): 36.8 (22 Jun 2023 15:13), Max: 98 (21 Jun 2023 23:16)  T(F): 98.2 (22 Jun 2023 15:13), Max: 208.4 (21 Jun 2023 23:16)  HR: 62 (22 Jun 2023 15:13) (54 - 62)  BP: 123/73 (22 Jun 2023 15:13) (123/73 - 152/80)  BP(mean): --  RR: 19 (22 Jun 2023 15:13) (18 - 19)  SpO2: 98% (22 Jun 2023 15:13) (97% - 100%)    Parameters below as of 22 Jun 2023 15:13  Patient On (Oxygen Delivery Method): nasal cannula  O2 Flow (L/min): 2      General: Elderly male lying in bed comfortably. No acute distress  HEENT: PERRLA. EOMI. Conjunctival injection on right side. Moist mucus membrane  Neck: Supple.   Chest: CTA bilaterally - no wheezing, rales or rhonchi.   Heart: Normal S1 & S2 with RRR.   Abdomen: Non distended. Soft. Non-tender. + BS  Ext: No pedal edema. No calf tenderness   Neuro: Awake. confused ,  Following commands intermittently. mild Right facial droop. Motor 5/5 in all extremities. Sensation intact. Reflexes 1+. Baseline aphasia.   Skin: Warm and Dry

## 2023-06-22 NOTE — SWALLOW BEDSIDE ASSESSMENT ADULT - PHARYNGEAL PHASE
Within functional limits when fed by ST; cough x1 when self-fed due to intake of large bolus size/Within functional limits

## 2023-06-22 NOTE — PROGRESS NOTE ADULT - ASSESSMENT
77 year old male with history of Dementia, PAD s/p LE Stents x 2, HTN (diet controlled), HLD, Hypothyroidism and BPH brought by EMS with right facial droop. History was taken from wife at bedside as patient is unable to provide any information due to his baseline dementia. Per wife, patient was doing well until this morning when she went to visit him and noticed his right eye was drooping. Few minutes later she noticed right facial droop as well so she called the NH staff who confirmed that these are new changes so EMS was called. He does not speak much but there was no change from baseline. No arm/leg weakness. He had breakfast this morning with no difficulty swallowing.  In ER, he was Code Stroke. NIH 1. Not a candidate for Tenecteplase. CT Scans with no acute findings.     1) R/O Stroke  - Stroke protocol  - MRI Brain pending   - echo pending   - Lipid Profile and HbA1c   - PT/OT  - Aspirin Suppository   - Will start Lipitor once passes dysphagia screen (failed earlier today)   - Permissive HTN    2) Hypernatremia/ imrpoving   - Gentle hydration     3) PAD / HLD / HTN  - Will resume Plavix and Statins once starts PO    4) Hypothyroidism  - Levothyroxine 37.5 IVP daily     5) BPH  - Will resume Flomax once starts PO    6) Dementia   - Will resume Memantine once starts PO    DVT Prophylaxis -- Venodyne    Dispo: Vladislav Valadez in 48 hours.

## 2023-06-22 NOTE — SWALLOW BEDSIDE ASSESSMENT ADULT - SWALLOW EVAL: DIAGNOSIS
Oral stage grossly WFL. Pharyngeal stage of swallow clinically unremarkable for puree, solids & small cup sips of thin fluids. Cough observed post intake when self fed-via intake of large bolus size

## 2023-06-23 LAB
ALBUMIN SERPL ELPH-MCNC: 3.8 G/DL — SIGNIFICANT CHANGE UP (ref 3.3–5.2)
ALP SERPL-CCNC: 79 U/L — SIGNIFICANT CHANGE UP (ref 40–120)
ALT FLD-CCNC: 8 U/L — SIGNIFICANT CHANGE UP
ANION GAP SERPL CALC-SCNC: 12 MMOL/L — SIGNIFICANT CHANGE UP (ref 5–17)
AST SERPL-CCNC: 14 U/L — SIGNIFICANT CHANGE UP
BILIRUB SERPL-MCNC: 0.5 MG/DL — SIGNIFICANT CHANGE UP (ref 0.4–2)
BUN SERPL-MCNC: 18.5 MG/DL — SIGNIFICANT CHANGE UP (ref 8–20)
CALCIUM SERPL-MCNC: 9 MG/DL — SIGNIFICANT CHANGE UP (ref 8.4–10.5)
CHLORIDE SERPL-SCNC: 107 MMOL/L — SIGNIFICANT CHANGE UP (ref 96–108)
CO2 SERPL-SCNC: 28 MMOL/L — SIGNIFICANT CHANGE UP (ref 22–29)
CREAT SERPL-MCNC: 0.89 MG/DL — SIGNIFICANT CHANGE UP (ref 0.5–1.3)
EGFR: 89 ML/MIN/1.73M2 — SIGNIFICANT CHANGE UP
GLUCOSE SERPL-MCNC: 105 MG/DL — HIGH (ref 70–99)
HCT VFR BLD CALC: 40.6 % — SIGNIFICANT CHANGE UP (ref 39–50)
HGB BLD-MCNC: 13.4 G/DL — SIGNIFICANT CHANGE UP (ref 13–17)
MAGNESIUM SERPL-MCNC: 2.1 MG/DL — SIGNIFICANT CHANGE UP (ref 1.6–2.6)
MCHC RBC-ENTMCNC: 29.6 PG — SIGNIFICANT CHANGE UP (ref 27–34)
MCHC RBC-ENTMCNC: 33 GM/DL — SIGNIFICANT CHANGE UP (ref 32–36)
MCV RBC AUTO: 89.8 FL — SIGNIFICANT CHANGE UP (ref 80–100)
MRSA PCR RESULT.: SIGNIFICANT CHANGE UP
PLATELET # BLD AUTO: 161 K/UL — SIGNIFICANT CHANGE UP (ref 150–400)
POTASSIUM SERPL-MCNC: 4.3 MMOL/L — SIGNIFICANT CHANGE UP (ref 3.5–5.3)
POTASSIUM SERPL-SCNC: 4.3 MMOL/L — SIGNIFICANT CHANGE UP (ref 3.5–5.3)
PROT SERPL-MCNC: 6.5 G/DL — LOW (ref 6.6–8.7)
RBC # BLD: 4.52 M/UL — SIGNIFICANT CHANGE UP (ref 4.2–5.8)
RBC # FLD: 13.3 % — SIGNIFICANT CHANGE UP (ref 10.3–14.5)
S AUREUS DNA NOSE QL NAA+PROBE: SIGNIFICANT CHANGE UP
SODIUM SERPL-SCNC: 147 MMOL/L — HIGH (ref 135–145)
WBC # BLD: 10.43 K/UL — SIGNIFICANT CHANGE UP (ref 3.8–10.5)
WBC # FLD AUTO: 10.43 K/UL — SIGNIFICANT CHANGE UP (ref 3.8–10.5)

## 2023-06-23 PROCEDURE — 70551 MRI BRAIN STEM W/O DYE: CPT | Mod: 26

## 2023-06-23 PROCEDURE — 99223 1ST HOSP IP/OBS HIGH 75: CPT

## 2023-06-23 PROCEDURE — 99232 SBSQ HOSP IP/OBS MODERATE 35: CPT

## 2023-06-23 PROCEDURE — 93306 TTE W/DOPPLER COMPLETE: CPT | Mod: 26

## 2023-06-23 RX ORDER — TAMSULOSIN HYDROCHLORIDE 0.4 MG/1
0.4 CAPSULE ORAL AT BEDTIME
Refills: 0 | Status: DISCONTINUED | OUTPATIENT
Start: 2023-06-23 | End: 2023-06-26

## 2023-06-23 RX ORDER — SODIUM CHLORIDE 9 MG/ML
1000 INJECTION, SOLUTION INTRAVENOUS
Refills: 0 | Status: DISCONTINUED | OUTPATIENT
Start: 2023-06-23 | End: 2023-06-24

## 2023-06-23 RX ORDER — CHLORHEXIDINE GLUCONATE 213 G/1000ML
1 SOLUTION TOPICAL
Refills: 0 | Status: DISCONTINUED | OUTPATIENT
Start: 2023-06-23 | End: 2023-06-26

## 2023-06-23 RX ORDER — LEVOTHYROXINE SODIUM 125 MCG
75 TABLET ORAL DAILY
Refills: 0 | Status: DISCONTINUED | OUTPATIENT
Start: 2023-06-23 | End: 2023-06-26

## 2023-06-23 RX ORDER — ATORVASTATIN CALCIUM 80 MG/1
80 TABLET, FILM COATED ORAL AT BEDTIME
Refills: 0 | Status: DISCONTINUED | OUTPATIENT
Start: 2023-06-23 | End: 2023-06-24

## 2023-06-23 RX ADMIN — Medication 1 DROP(S): at 23:10

## 2023-06-23 RX ADMIN — Medication 1 DROP(S): at 15:37

## 2023-06-23 RX ADMIN — Medication 1 DROP(S): at 05:33

## 2023-06-23 RX ADMIN — TAMSULOSIN HYDROCHLORIDE 0.4 MILLIGRAM(S): 0.4 CAPSULE ORAL at 23:10

## 2023-06-23 RX ADMIN — Medication 81 MILLIGRAM(S): at 11:29

## 2023-06-23 RX ADMIN — CEFTRIAXONE 1000 MILLIGRAM(S): 500 INJECTION, POWDER, FOR SOLUTION INTRAMUSCULAR; INTRAVENOUS at 05:33

## 2023-06-23 RX ADMIN — ATORVASTATIN CALCIUM 80 MILLIGRAM(S): 80 TABLET, FILM COATED ORAL at 23:10

## 2023-06-23 RX ADMIN — SODIUM CHLORIDE 75 MILLILITER(S): 9 INJECTION, SOLUTION INTRAVENOUS at 11:29

## 2023-06-23 RX ADMIN — CHLORHEXIDINE GLUCONATE 1 APPLICATION(S): 213 SOLUTION TOPICAL at 15:38

## 2023-06-23 NOTE — PROGRESS NOTE ADULT - ASSESSMENT
77 year old male with history of Dementia, PAD s/p LE Stents x 2, HTN (diet controlled), HLD, Hypothyroidism and BPH brought by EMS with right facial droop. History was taken from wife at bedside as patient is unable to provide any information due to his baseline dementia. Per wife, patient was doing well until this morning when she went to visit him and noticed his right eye was drooping. Few minutes later she noticed right facial droop as well so she called the NH staff who confirmed that these are new changes so EMS was called. He does not speak much but there was no change from baseline. No arm/leg weakness. He had breakfast this morning with no difficulty swallowing.  In ER, he was Code Stroke. NIH 1. Not a candidate for Tenecteplase. CT Scans with no acute findings.     > facial droop / likely cva   - < from: CT Angio Neck w/ IV Cont (06.21.23 @ 14:00) >No acute intracranial hemorrhage. Age-indeterminate lacunar infarct posterior limb right internal capsule. Artifact versus questionable loss of gray-white matter differentiation in the bilateral occipital lobes. MRI exam recommended to exclude acute ischemia.  No hemodynamically significant stenosis  -In ER, he was Code Stroke. NIH 1. Not a candidate for Tenecteplase. CT Scans with no acute findings.   - Stroke protocol  - neuro checks   - echo , MRI Brain pending   - Lipid Profile and HbA1c   - c/w aspirin , statin   - PT/OT, speech eval   - neuro consulted     >Hypernatremia/ lkely due to dehydration   -  improving   - Gentle hydration     > ua positive / possible uti  - started on     >PAD / HLD / HTN  - resume Plavix if no bleed on mri   - c/w statins     >Hypothyroidism  - Levothyroxine 37.5 IVP daily , change to po     > BPH  - po flomax     >Dementia   - c/w Memantine     >DVT Prophylaxis -- Venodyne pendign mri

## 2023-06-23 NOTE — CONSULT NOTE ADULT - ASSESSMENT
ASSESSMENT:     NEURO: neurologically ---, Continue close monitoring for neurologic deterioration , stroke neuro checks q _ , permissive HTN or  SBP goal , titrate statin to LDL goal less than 70, MRI Brain w/o, MRA Head w/o and Neck w/contrast. Physical therapy/OT/Speech eval/treatment.     ANTITHROMBOTIC THERAPY: ASA 81mg    PULMONARY: CXR showed minimal atelectasis or scarring of the lateral left base. protecting airway, saturating well     CARDIOVASCULAR: TTE pending, cardiac monitoring to screen for atrial fibrillation                         GASTROINTESTINAL:  dysphagia screen  pass/fail     Diet:     RENAL: BUN/Cr _, monitor urine output, maintain adquate hydration       Na Goal:  135-145     Cosme:    HEMATOLOGY: H/H _, Platelets __, patient should have all age and risk appropriate malignancy screenings with PCP or sooner if clinically suspected      DVT ppx: Heparin s.c [] LMWH []     ID: afebrile, no leukocytosis, monitor for si/sx of infection     OTHER:  condition and plan of care d/w patient, questsions and concerns addressed.     DISPOSITION: Rehab or home depending on PT eval once stable and workup is complete      CORE MEASURES:        Admission NIHSS:      Tenecteplase : [] YES [] NO      LDL/HDL/A1C:     Depression Screen- if depression hx and/or present      Statin Therapy:     Dysphagia Screen: [] PASS [] FAIL     Smoking [] YES [] NO      Afib [] YES [] NO     Stroke Education [] YES [] NO    Obtain screening lower extremity venous ultrasound in patients who meet 1 or more of the following criteria as patient is high risk for DVT/PE on admission:   [] History of DVT/PE  []Hypercoagulable states (Factor V Leiden, Cancer, OCP, etc. )  []Prolonged immobility (hemiplegia/hemiparesis/post operative or any other extended immobilization)  [] Transferred from outside facility (Rehab or Long term care)  [] Age </= to 50 ASSESSMENT:     NEURO: neurologically stable with right facial weakness. Continue close monitoring for neurologic deterioration , stroke neuro checks q 4 , currently tolerating 120-140s, avoid rapid fluctuations and hypotension, titrate statin to LDL goal less than 70, MRI Brain w/o, MRA Head w/o and Neck w/contrast. Physical therapy/OT/Speech eval/treatment.     ANTITHROMBOTIC THERAPY: ASA 81mg    PULMONARY: CXR showed minimal atelectasis or scarring of the lateral left base. protecting airway, saturating well     CARDIOVASCULAR: TTE pending, cardiac monitoring to screen for atrial fibrillation                         GASTROINTESTINAL:  dysphagia screen- fail     Diet: NPO    RENAL: BUN/Cr 18.5/0.89, monitor urine output, maintain adequate hydration       Na Goal:  135-145    HEMATOLOGY: H/H 13.4/40.6, Platelets 161,  patient should have all age and risk appropriate malignancy screenings with PCP or sooner if clinically suspected      DVT ppx: Heparin s.c [] LMWH [] - no absolute neurological contraindication to DVT prophylaxis    ID: afebrile, no leukocytosis, monitor for si/sx of infection. Suggest urine culture due to possible UTI.     OTHER:  condition and plan of care d/w patient, questsions and concerns addressed.     DISPOSITION: Rehab or home depending on PT eval once stable and workup is complete      CORE MEASURES:        Admission NIHSS: 1     Tenecteplase : [] YES [x} NO      LDL/HDL/A1C: 40/44/6.0%     Depression Screen- if depression hx and/or present      Statin Therapy: Atorvastatin 80mg     Dysphagia Screen: [] PASS [x] FAIL     Smoking [] YES [x] NO      Afib [] YES [x] NO     Stroke Education [] YES [] NO- ordered and pending    Obtain screening lower extremity venous ultrasound in patients who meet 1 or more of the following criteria as patient is high risk for DVT/PE on admission:   [] History of DVT/PE  []Hypercoagulable states (Factor V Leiden, Cancer, OCP, etc. )  []Prolonged immobility (hemiplegia/hemiparesis/post operative or any other extended immobilization)  [] Transferred from outside facility (Rehab or Long term care)  [] Age </= to 50 ASSESSMENT: 77 year old male with history of Dementia, PAD s/p LE Stents x 2, HTN (diet controlled), HLD, Hypothyroidism and BPH brought by EMS on 6/21/23 with right sided upper motor facial weakness. History was taken from wife at bedside as patient is unable to provide any information due to his baseline dementia. As per wife, patient lives at a memory center where the nurse woke him up on 6/21/23 at an unknown time in the morning and she noticed a right eye weakness. Last known well unknown. The wife then came to visit him at 12:00 pm, where she noticed a right upper motor neuron facial weakness. Initial CT showed no acute intracranial hemorrhage and an age-indeterminate lacunar infarct in the posterior limb right internal capsule. Also, artifact versus questionable loss of gray-white matter differentiation in   the bilateral occipital lobes. CT perfusion was unremarkable. CT angio head w/out showed no findings. CT angio neck w/ showed calcified atherosclerotic plaque at the right carotid bulb with all other arteries patent. Patient not a tenecteplase candidate due to time of last known normal unknown and NIHSS of 1. Patient not a thrombectomy candidate due to no presence of LVO.     Etiology: Workup for stroke and pending MRI to evaluate further.  Old lacunar infacrt likely due to small vessel disease. Need to maintain management of risk factors such as hypertension, hyperlipidemia, and diabetes     NEURO: neurologically stable with right upper motor facial weakness. Continue close monitoring for neurologic deterioration , stroke neuro checks q 4 , currently tolerating 120-140s, avoid rapid fluctuations and hypotension, titrate statin to LDL goal less than 70, MRI Brain w/o,  Physical therapy/OT/Speech eval/treatment.     ANTITHROMBOTIC THERAPY: ASA 81mg    PULMONARY: CXR showed minimal atelectasis or scarring of the lateral left base. protecting airway, saturating well     CARDIOVASCULAR: TTE pending, cardiac monitoring to screen for atrial fibrillation                         GASTROINTESTINAL:  dysphagia screen- fail     Diet: NPO    RENAL: BUN/Cr 18.5/0.89, monitor urine output, maintain adequate hydration       Na Goal:  135-145    HEMATOLOGY: H/H 13.4/40.6, Platelets 161,  patient should have all age and risk appropriate malignancy screenings with PCP or sooner if clinically suspected      DVT ppx: Heparin s.c [] LMWH [] - no absolute neurological contraindication to DVT prophylaxis    ID: afebrile, no leukocytosis, monitor for si/sx of infection. Suggest urine culture due to possible UTI.     OTHER:  condition and plan of care d/w patient, questions and concerns addressed.     DISPOSITION: Rehab or home depending on PT eval once stable and workup is complete      CORE MEASURES:        Admission NIHSS: 1     Tenecteplase : [] YES [x} NO      LDL/HDL/A1C: 40/44/6.0%     Depression Screen- if depression hx and/or present      Statin Therapy: Atorvastatin 80mg     Dysphagia Screen: [] PASS [x] FAIL     Smoking [] YES [x] NO      Afib [] YES [x] NO     Stroke Education [] YES [] NO- ordered and pending    Obtain screening lower extremity venous ultrasound in patients who meet 1 or more of the following criteria as patient is high risk for DVT/PE on admission:   [] History of DVT/PE  []Hypercoagulable states (Factor V Leiden, Cancer, OCP, etc. )  []Prolonged immobility (hemiplegia/hemiparesis/post operative or any other extended immobilization)  [x] Transferred from outside facility (Rehab or Long term care)  [] Age </= to 50 ASSESSMENT: 77 year old male with history of Dementia, PAD s/p LE Stents x 2, HTN (diet controlled), HLD, Hypothyroidism and BPH brought by EMS on 6/21/23 with right sided upper motor facial weakness. History was taken from wife at bedside as patient is unable to provide any information due to his baseline dementia. As per wife, patient lives at a memory center where the nurse woke him up on 6/21/23 at an unknown time in the morning and she noticed a right eye weakness. Last known well unknown. The wife then came to visit him at 12:00 pm, where she noticed a right upper motor neuron facial weakness. Initial CT showed no acute intracranial hemorrhage and an age-indeterminate lacunar infarct in the posterior limb right internal capsule. Also, artifact versus questionable loss of gray-white matter differentiation in   the bilateral occipital lobes. CT perfusion was unremarkable. CT angio head w/out showed no findings. CT angio neck w/ showed calcified atherosclerotic plaque at the right carotid bulb with all other arteries patent. Patient not a tenecteplase candidate due to time of last known normal unknown and NIHSS of 1. Patient not a thrombectomy candidate due to no presence of LVO.     Etiology: Workup for stroke and pending MRI to evaluate further.  Old lacunar infarct likely due to small vessel disease. Need to maintain management of risk factors such as hypertension, hyperlipidemia, and diabetes     NEURO: neurologically stable with right upper motor facial weakness. Continue close monitoring for neurologic deterioration , stroke neuro checks q 4 , currently tolerating 120-140s, avoid rapid fluctuations and hypotension, titrate statin to LDL goal less than 70, MRI Brain w/o,  Physical therapy/OT/Speech eval/treatment.     ANTITHROMBOTIC THERAPY: ASA 81mg    PULMONARY: CXR showed minimal atelectasis or scarring of the lateral left base. protecting airway, saturating well     CARDIOVASCULAR: TTE pending, cardiac monitoring to screen for atrial fibrillation                         GASTROINTESTINAL:  dysphagia screen- fail     Diet: NPO    RENAL: BUN/Cr 18.5/0.89, monitor urine output, maintain adequate hydration       Na Goal:  135-145    HEMATOLOGY: H/H 13.4/40.6, Platelets 161,  patient should have all age and risk appropriate malignancy screenings with PCP or sooner if clinically suspected      DVT ppx: Heparin s.c [] LMWH [] - no absolute neurological contraindication to DVT prophylaxis    ID: afebrile, no leukocytosis, monitor for si/sx of infection. Suggest urine culture due to possible UTI.     OTHER:  condition and plan of care d/w patient, questions and concerns addressed.     DISPOSITION: Rehab or home depending on PT eval once stable and workup is complete      CORE MEASURES:        Admission NIHSS: 1     Tenecteplase : [] YES [x} NO      LDL/HDL/A1C: 40/44/6.0%     Depression Screen- if depression hx and/or present      Statin Therapy: Atorvastatin 80mg     Dysphagia Screen: [] PASS [x] FAIL     Smoking [] YES [x] NO      Afib [] YES [x] NO     Stroke Education [] YES [] NO- ordered and pending    Obtain screening lower extremity venous ultrasound in patients who meet 1 or more of the following criteria as patient is high risk for DVT/PE on admission:   [] History of DVT/PE  []Hypercoagulable states (Factor V Leiden, Cancer, OCP, etc. )  []Prolonged immobility (hemiplegia/hemiparesis/post operative or any other extended immobilization)  [x] Transferred from outside facility (Rehab or Long term care)  [] Age </= to 50

## 2023-06-23 NOTE — PROGRESS NOTE ADULT - SUBJECTIVE AND OBJECTIVE BOX
cc: r/o cva , hypernatremia       interval hx: patient seen and eval.a wake, alert x 0-1 , comfortable, not following commands,    Vital Signs Last 24 Hrs  T(C): 37.1 (23 Jun 2023 15:12), Max: 37.1 (23 Jun 2023 05:18)  T(F): 98.7 (23 Jun 2023 15:12), Max: 98.7 (23 Jun 2023 05:18)  HR: 63 (23 Jun 2023 15:12) (57 - 68)  BP: 138/77 (23 Jun 2023 15:12) (101/61 - 138/77)  BP(mean): 97 (23 Jun 2023 15:12) (97 - 97)  RR: 18 (23 Jun 2023 15:12) (18 - 19)  SpO2: 98% (23 Jun 2023 15:12) (95% - 98%)    Parameters below as of 23 Jun 2023 15:12  Patient On (Oxygen Delivery Method): nasal cannula  O2 Flow (L/min): 3      General: Elderly male lying in bed comfortably. No acute distress  HEENT: PERRLA. EOMI. Conjunctival injection on right side. Moist mucus membrane  Neck: Supple.   Chest: CTA bilaterally - no wheezing, rales or rhonchi.   Heart: Normal S1 & S2 with RRR.   Abdomen: Non distended. Soft. Non-tender. + BS  Ext: No pedal edema. No calf tenderness   Neuro: Awake. confused ,  Following commands intermittently. mild Right facial droop. Motor 5/5 in all extremities. Sensation intact. Reflexes 1+. Baseline aphasia.   Skin: Warm and Dry     MEDICATIONS  (STANDING):  aspirin  chewable 81 milliGRAM(s) Oral daily  cefTRIAXone Injectable. 1000 milliGRAM(s) IV Push every 24 hours  chlorhexidine 2% Cloths 1 Application(s) Topical <User Schedule>  ciprofloxacin  0.3% Ophthalmic Solution 1 Drop(s) Right EYE every 8 hours  dextrose 5% + sodium chloride 0.45%. 1000 milliLiter(s) (75 mL/Hr) IV Continuous <Continuous>  lactated ringers. 1000 milliLiter(s) (75 mL/Hr) IV Continuous <Continuous>  levothyroxine Injectable 37.5 MICROGram(s) IV Push at bedtime    MEDICATIONS  (PRN):  acetaminophen  Suppository .. 650 milliGRAM(s) Rectal every 6 hours PRN Temp greater or equal to 38C (100.4F), Mild Pain (1 - 3), Moderate Pain (4 - 6)  haloperidol    Injectable 2.5 milliGRAM(s) IntraMuscular every 6 hours PRN Agitation  ondansetron Injectable 4 milliGRAM(s) IV Push every 6 hours PRN Nausea and/or Vomiting   cc: r/o cva , hypernatremia       interval hx: patient seen and eval.a wake, alert x 0-1 , comfortable, not following commands,    Vital Signs Last 24 Hrs  T(C): 37.1 (23 Jun 2023 15:12), Max: 37.1 (23 Jun 2023 05:18)  T(F): 98.7 (23 Jun 2023 15:12), Max: 98.7 (23 Jun 2023 05:18)  HR: 63 (23 Jun 2023 15:12) (57 - 68)  BP: 138/77 (23 Jun 2023 15:12) (101/61 - 138/77)  BP(mean): 97 (23 Jun 2023 15:12) (97 - 97)  RR: 18 (23 Jun 2023 15:12) (18 - 19)  SpO2: 98% (23 Jun 2023 15:12) (95% - 98%)    Parameters below as of 23 Jun 2023 15:12  Patient On (Oxygen Delivery Method): nasal cannula  O2 Flow (L/min): 3      General: Elderly male lying in bed comfortably. No acute distress  HEENT: PERRLA. EOMI. Conjunctival injection on right side. Moist mucus membrane  Neck: Supple.   Chest: CTA bilaterally - no wheezing, rales or rhonchi.   Heart: Normal S1 & S2 with RRR.   Abdomen: Non distended. Soft. Non-tender. + BS  Ext: No pedal edema. No calf tenderness   Neuro: Awake. confused ,  Following commands intermittently. mild Right facial droop. Motor 4/5 in all extremities, keeps arms crossed. Sensation intact. Reflexes 1+. Baseline aphasia.   Skin: Warm and Dry     MEDICATIONS  (STANDING):  aspirin  chewable 81 milliGRAM(s) Oral daily  cefTRIAXone Injectable. 1000 milliGRAM(s) IV Push every 24 hours  chlorhexidine 2% Cloths 1 Application(s) Topical <User Schedule>  ciprofloxacin  0.3% Ophthalmic Solution 1 Drop(s) Right EYE every 8 hours  dextrose 5% + sodium chloride 0.45%. 1000 milliLiter(s) (75 mL/Hr) IV Continuous <Continuous>  lactated ringers. 1000 milliLiter(s) (75 mL/Hr) IV Continuous <Continuous>  levothyroxine Injectable 37.5 MICROGram(s) IV Push at bedtime    MEDICATIONS  (PRN):  acetaminophen  Suppository .. 650 milliGRAM(s) Rectal every 6 hours PRN Temp greater or equal to 38C (100.4F), Mild Pain (1 - 3), Moderate Pain (4 - 6)  haloperidol    Injectable 2.5 milliGRAM(s) IntraMuscular every 6 hours PRN Agitation  ondansetron Injectable 4 milliGRAM(s) IV Push every 6 hours PRN Nausea and/or Vomiting

## 2023-06-23 NOTE — CONSULT NOTE ADULT - NS ATTEND AMEND GEN_ALL_CORE FT
Seen and examined with the ACP team.  Plan discussed with ACPs.  I agree with as written above with modifications as below    right facial weakness  new, but no clear time of onset  advanced dementia, aphasic at baseline  pending MRI brain to clarify if this is acute stroke    continue ASA 81 mg  Following MRI brain can likely return to his memory disorder unit, would like to avoid prolonged immobility in hospital setting  PT/OT as tolerated    will follow with you    Santi Cho MD PhD   [id number] Seen and examined with the ACP team.  Plan discussed with ACPs.  I agree with as written above with modifications as below    right facial weakness  new, but no clear time of onset  advanced dementia, aphasic at baseline  pending MRI brain to clarify if this is acute stroke    continue ASA 81 mg  Following MRI brain can likely return to his memory disorder unit, would like to avoid prolonged immobility in hospital setting  PT/OT as tolerated    will follow with you    Santi Cho MD PhD   [id number] Seen and examined with the ACP team.  Plan discussed with ACPs.  I agree with as written above with modifications as below    right facial weakness  new, but no clear time of onset  advanced dementia, aphasic at baseline  pending MRI brain to clarify if this is acute stroke    continue ASA 81 mg  Following MRI brain can likely return to his memory disorder unit, would like to avoid prolonged immobility in hospital setting  PT/OT as tolerated    will follow with you    Santi Cho MD PhD   [id number]

## 2023-06-23 NOTE — CONSULT NOTE ADULT - SUBJECTIVE AND OBJECTIVE BOX
Preliminary note, official note pending attending review/signature.                               [facility] Stroke Team  CC:  HPI:  77 year old male with history of Dementia, PAD s/p LE Stents x 2, HTN (diet controlled), HLD, Hypothyroidism and BPH brought by EMS with right facial droop. History was taken from wife at bedside as patient is unable to provide any information due to his baseline dementia. Per wife, patient was doing well until this morning when she went to visit him and noticed his right eye was drooping. Few minutes later she noticed right facial droop as well so she called the NH staff who confirmed that these are new changes so EMS was called. He does not speak much but there was no change from baseline. No arm/leg weakness. He had breakfast this morning with no difficulty swallowing.  In ER, he was Code Stroke. NIH 1. Not a candidate for Tenecteplase. CT Scans with no acute findings.  (21 Jun 2023 16:03)  The patient is a 77y Male who presented with    77 year old male with history of Dementia, PAD s/p LE Stents x 2, HTN (diet controlled), HLD, Hypothyroidism and BPH brought by EMS with right facial droop. History was taken from wife at bedside as patient is unable to provide any information due to his baseline dementia. Per wife, patient was doing well until this morning when she went to visit him and noticed his right eye was drooping. Few minutes later she noticed right facial droop as well so she called the NH staff who confirmed that these are new changes so EMS was called. He does not speak much but there was no change from baseline. No arm/leg weakness. He had breakfast this morning with no difficulty swallowing.  In ER, he was Code Stroke. NIH 1. Not a candidate for Tenecteplase. CT Scans with no acute findings.     Stroke risk factors:    PAST MEDICAL & SURGICAL HISTORY:  Dementia      PAD (peripheral artery disease)      HTN (hypertension)      HLD (hyperlipidemia)      Status post arterial stent      History of left knee replacement          MEDICATIONS  (STANDING):  aspirin  chewable 81 milliGRAM(s) Oral daily  atorvastatin 80 milliGRAM(s) Oral at bedtime  cefTRIAXone Injectable. 1000 milliGRAM(s) IV Push every 24 hours  chlorhexidine 2% Cloths 1 Application(s) Topical <User Schedule>  ciprofloxacin  0.3% Ophthalmic Solution 1 Drop(s) Right EYE every 8 hours  dextrose 5% + sodium chloride 0.45%. 1000 milliLiter(s) (75 mL/Hr) IV Continuous <Continuous>  lactated ringers. 1000 milliLiter(s) (75 mL/Hr) IV Continuous <Continuous>  levothyroxine 75 MICROGram(s) Oral daily  tamsulosin 0.4 milliGRAM(s) Oral at bedtime    MEDICATIONS  (PRN):  acetaminophen  Suppository .. 650 milliGRAM(s) Rectal every 6 hours PRN Temp greater or equal to 38C (100.4F), Mild Pain (1 - 3), Moderate Pain (4 - 6)  haloperidol    Injectable 2.5 milliGRAM(s) IntraMuscular every 6 hours PRN Agitation  ondansetron Injectable 4 milliGRAM(s) IV Push every 6 hours PRN Nausea and/or Vomiting      Allergies    penicillin (Hives; Urticaria)    Intolerances        SOCIAL HISTORY:  no tob,   no alcohol   no drugs    FAMILY HISTORY:  No pertinent family history in first degree relatives          ROS: 14 point ROS negative other than what is present in HPI or below    Vital Signs Last 24 Hrs  T(C): 37.1 (23 Jun 2023 15:12), Max: 37.1 (23 Jun 2023 05:18)  T(F): 98.7 (23 Jun 2023 15:12), Max: 98.7 (23 Jun 2023 05:18)  HR: 63 (23 Jun 2023 15:12) (57 - 68)  BP: 138/77 (23 Jun 2023 15:12) (101/61 - 138/77)  BP(mean): 97 (23 Jun 2023 15:12) (97 - 97)  RR: 18 (23 Jun 2023 15:12) (18 - 19)  SpO2: 98% (23 Jun 2023 15:12) (95% - 98%)    Parameters below as of 23 Jun 2023 15:12  Patient On (Oxygen Delivery Method): nasal cannula  O2 Flow (L/min): 3        General: NAD    Detailed Neurologic Exam:    Mental status: The patient is awake and alert and has normal attention span.  The patient is fully oriented in 3 spheres. The patient is oriented to current events. The patient is able to name objects, follow commands, repeat sentences.    Cranial nerves: Pupils equal and react symmetrically to light. There is no visual field deficit to confrontation. Extraocular motion is full with no nystagmus. There is no ptosis. Facial sensation is intact. Facial musculature is symmetric. Palate elevates symmetrically. Tongue is midline.    Motor: There is normal bulk and tone.  There is no tremor.  Strength is 5/5 in the right arm and leg.   Strength is 5/5 in the left arm and leg.    Sensation: Intact to light touch and pin in 4 extremities    Reflexes: 1-2+ throughout and plantar responses are flexor.    Cerebellar: There is no dysmetria on finger to nose testing.    Gait : deferred    NIH SS:  DATE: 6/23/23  TIME: 16:00  1A: Level of consciousness (0-3): 0  1B: Questions (0-2): 2  1C: Commands (0-2): 2  2: Gaze (0-2): 0  3: Visual fields (0-3): 0  4: Facial palsy (0-3): 1  MOTOR:  5A: Left arm motor drift (0-4): 0  5B: Right arm motor drift (0-4): 0  6A: Left leg motor drift (0-4): 0  6B: Right leg motor drift (0-4): 0  7: Limb ataxia (0-2): 0  SENSORY:  8: Sensation (0-2): 0  SPEECH:  9: Language (0-3): 3  10: Dysarthria (0-2): 1  EXTINCTION:  11: Extinction/inattention (0-2): 0    TOTAL SCORE: 9    prehospital mRS= 5      LABS:                         13.4   10.43 )-----------( 161      ( 23 Jun 2023 02:54 )             40.6       06-23    147<H>  |  107  |  18.5  ----------------------------<  105<H>  4.3   |  28.0  |  0.89    Ca    9.0      23 Jun 2023 02:54  Mg     2.1     06-23    TPro  6.5<L>  /  Alb  3.8  /  TBili  0.5  /  DBili  x   /  AST  14  /  ALT  8   /  AlkPhos  79  06-23    Lipid Profile (06.22.23 @ 02:50)    Cholesterol: 105 mg/dL   Triglycerides, Serum: 104 mg/dL   HDL Cholesterol: 44 mg/dL   Non HDL Cholesterol: 61tients              LDL Cholesterol Calculated: 40 mg/dL    (06.22.23 @ 02:50)    A1C with Estimated Average Glucose Result: 6.0 %   Estimated Average Glucose: 126 mg/dL    RADIOLOGY & ADDITIONAL STUDIES (independently reviewed unless otherwise noted):    CT Brain Stroke Protocol (06.21.23 @ 13:51)   IMPRESSION:  No acute intracranial hemorrhage.    Age-indeterminate lacunar infarct posterior limb right internal capsule.   Artifact versus questionable loss of gray-white matter differentiation in   the bilateral occipital lobes. MRI exam recommended to exclude acute   ischemia.   ER called at 2:00 PM and 2:02 PM on 6/21/2023. Dr. Perez   notified at 2:04 PM    Unremarkable CT perfusion    No hemodynamically significant stenosis    CTA Head and Neck and CT Perfusion 6/21/23  CTA of the neck:  The common carotid and internal carotid arteries are patent. Calcified   atherosclerotic plaque at the right carotid bulb noted  The extracranial vertebral arteries are patent.  Degenerative changes noted. Small thyroid nodules noted    CTA Head:  The proximal anterior, middle and posterior cerebral arteries are patent.  The intracranial vertebral and basilar arteries are patent.  There is no intracranial aneurysm.    CT perfusion:  No core infarct or evidence of delayed mean transit time is identified.     Preliminary note, official note pending attending review/signature.                               [facility] Stroke Team  CC:  HPI:  77 year old male with history of Dementia, PAD s/p LE Stents x 2, HTN (diet controlled), HLD, Hypothyroidism and BPH brought by EMS with right facial droop. History was taken from wife at bedside as patient is unable to provide any information due to his baseline dementia. Per wife, patient was doing well until this morning when she went to visit him and noticed his right eye was drooping. Few minutes later she noticed right facial droop as well so she called the NH staff who confirmed that these are new changes so EMS was called. He does not speak much but there was no change from baseline. No arm/leg weakness. He had breakfast this morning with no difficulty swallowing.  In ER, he was Code Stroke. NIH 1. Not a candidate for Tenecteplase. CT Scans with no acute findings.  (21 Jun 2023 16:03)  The patient is a 77y Male who presented with    77 year old male with history of Dementia, PAD s/p LE Stents x 2, HTN (diet controlled), HLD, Hypothyroidism and BPH brought by EMS with right facial droop. History was taken from wife at bedside as patient is unable to provide any information due to his baseline dementia. Per wife, patient was doing well until this morning when she went to visit him and noticed his right eye was drooping. Few minutes later she noticed right facial droop as well so she called the NH staff who confirmed that these are new changes so EMS was called. He does not speak much but there was no change from baseline. No arm/leg weakness. He had breakfast this morning with no difficulty swallowing.  In ER, he was Code Stroke. NIH 1. Not a candidate for Tenecteplase. CT Scans with no acute findings.     Stroke risk factors:    PAST MEDICAL & SURGICAL HISTORY:  Dementia      PAD (peripheral artery disease)      HTN (hypertension)      HLD (hyperlipidemia)      Status post arterial stent      History of left knee replacement          MEDICATIONS  (STANDING):  aspirin  chewable 81 milliGRAM(s) Oral daily  atorvastatin 80 milliGRAM(s) Oral at bedtime  cefTRIAXone Injectable. 1000 milliGRAM(s) IV Push every 24 hours  chlorhexidine 2% Cloths 1 Application(s) Topical <User Schedule>  ciprofloxacin  0.3% Ophthalmic Solution 1 Drop(s) Right EYE every 8 hours  dextrose 5% + sodium chloride 0.45%. 1000 milliLiter(s) (75 mL/Hr) IV Continuous <Continuous>  lactated ringers. 1000 milliLiter(s) (75 mL/Hr) IV Continuous <Continuous>  levothyroxine 75 MICROGram(s) Oral daily  tamsulosin 0.4 milliGRAM(s) Oral at bedtime    MEDICATIONS  (PRN):  acetaminophen  Suppository .. 650 milliGRAM(s) Rectal every 6 hours PRN Temp greater or equal to 38C (100.4F), Mild Pain (1 - 3), Moderate Pain (4 - 6)  haloperidol    Injectable 2.5 milliGRAM(s) IntraMuscular every 6 hours PRN Agitation  ondansetron Injectable 4 milliGRAM(s) IV Push every 6 hours PRN Nausea and/or Vomiting      Allergies    penicillin (Hives; Urticaria)    Intolerances        SOCIAL HISTORY:  no tob,   no alcohol   no drugs    FAMILY HISTORY:  No pertinent family history in first degree relatives          ROS: 14 point ROS negative other than what is present in HPI or below    Vital Signs Last 24 Hrs  T(C): 37.1 (23 Jun 2023 15:12), Max: 37.1 (23 Jun 2023 05:18)  T(F): 98.7 (23 Jun 2023 15:12), Max: 98.7 (23 Jun 2023 05:18)  HR: 63 (23 Jun 2023 15:12) (57 - 68)  BP: 138/77 (23 Jun 2023 15:12) (101/61 - 138/77)  BP(mean): 97 (23 Jun 2023 15:12) (97 - 97)  RR: 18 (23 Jun 2023 15:12) (18 - 19)  SpO2: 98% (23 Jun 2023 15:12) (95% - 98%)    Parameters below as of 23 Jun 2023 15:12  Patient On (Oxygen Delivery Method): nasal cannula  O2 Flow (L/min): 3        General: NAD    Detailed Neurologic Exam:    Mental status: The patient is awake and alert and has normal attention span.  The patient is fully oriented in 3 spheres. The patient is oriented to current events. The patient is able to name objects, follow commands, repeat sentences.    Cranial nerves: Pupils equal and react symmetrically to light. There is no visual field deficit to confrontation. Extraocular motion is full with no nystagmus. There is no ptosis. Facial sensation is intact. Facial musculature is symmetric. Palate elevates symmetrically. Tongue is midline.    Motor: There is normal bulk and tone.  There is no tremor.  Strength is 5/5 in the right arm and leg.   Strength is 5/5 in the left arm and leg.    Sensation: Intact to light touch and pin in 4 extremities    Reflexes: 1-2+ throughout and plantar responses are flexor.    Cerebellar: There is no dysmetria on finger to nose testing.    Gait : deferred    NIH SS:  DATE: 6/23/23  TIME: 16:00  1A: Level of consciousness (0-3): 0  1B: Questions (0-2): 2  1C: Commands (0-2): 2  2: Gaze (0-2): 0  3: Visual fields (0-3): 0  4: Facial palsy (0-3): 1  MOTOR:  5A: Left arm motor drift (0-4): 0  5B: Right arm motor drift (0-4): 0  6A: Left leg motor drift (0-4): 0  6B: Right leg motor drift (0-4): 0  7: Limb ataxia (0-2): 0  SENSORY:  8: Sensation (0-2): 0  SPEECH:  9: Language (0-3): 3  10: Dysarthria (0-2): 1  EXTINCTION:  11: Extinction/inattention (0-2): 0    TOTAL SCORE: 9    prehospital mRS= 5      LABS:                         13.4   10.43 )-----------( 161      ( 23 Jun 2023 02:54 )             40.6       06-23    147<H>  |  107  |  18.5  ----------------------------<  105<H>  4.3   |  28.0  |  0.89    Ca    9.0      23 Jun 2023 02:54  Mg     2.1     06-23    TPro  6.5<L>  /  Alb  3.8  /  TBili  0.5  /  DBili  x   /  AST  14  /  ALT  8   /  AlkPhos  79  06-23    Lipid Profile (06.22.23 @ 02:50)    Cholesterol: 105 mg/dL   Triglycerides, Serum: 104 mg/dL   HDL Cholesterol: 44 mg/dL   Non HDL Cholesterol: 61tients              LDL Cholesterol Calculated: 40 mg/dL    (06.22.23 @ 02:50)    A1C with Estimated Average Glucose Result: 6.0 %   Estimated Average Glucose: 126 mg/dL    RADIOLOGY & ADDITIONAL STUDIES (independently reviewed unless otherwise noted):    CT Brain Stroke Protocol (06.21.23 @ 13:51)   IMPRESSION:  No acute intracranial hemorrhage.    Age-indeterminate lacunar infarct posterior limb right internal capsule.   Artifact versus questionable loss of gray-white matter differentiation in   the bilateral occipital lobes. MRI exam recommended to exclude acute   ischemia.   ER called at 2:00 PM and 2:02 PM on 6/21/2023. Dr. Perez   notified at 2:04 PM    Unremarkable CT perfusion    No hemodynamically significant stenosis    CTA Head and Neck and CT Perfusion 6/21/23  CTA of the neck:  The common carotid and internal carotid arteries are patent. Calcified   atherosclerotic plaque at the right carotid bulb noted  The extracranial vertebral arteries are patent.  Degenerative changes noted. Small thyroid nodules noted    CTA Head:  The proximal anterior, middle and posterior cerebral arteries are patent.  The intracranial vertebral and basilar arteries are patent.  There is no intracranial aneurysm.    CT perfusion:  No core infarct or evidence of delayed mean transit time is identified.     Preliminary note, official note pending attending review/signature.                               [facility] Stroke Team  CC:  HPI:  77 year old male with history of Dementia, PAD s/p LE Stents x 2, HTN (diet controlled), HLD, Hypothyroidism and BPH brought by EMS with right facial droop. History was taken from wife at bedside as patient is unable to provide any information due to his baseline dementia. Per wife, patient was doing well until this morning when she went to visit him and noticed his right eye was drooping. Few minutes later she noticed right facial droop as well so she called the NH staff who confirmed that these are new changes so EMS was called. He does not speak much but there was no change from baseline. No arm/leg weakness. He had breakfast this morning with no difficulty swallowing.  In ER, he was Code Stroke. NIH 1. Not a candidate for Tenecteplase. CT Scans with no acute findings.  (21 Jun 2023 16:03)  The patient is a 77y Male who presented with    77 year old male with history of Dementia, PAD s/p LE Stents x 2, HTN (diet controlled), HLD, Hypothyroidism and BPH brought by EMS with right facial droop. History was taken from wife at bedside as patient is unable to provide any information due to his baseline dementia. Per wife, patient was doing well until this morning when she went to visit him and noticed his right eye was drooping. Few minutes later she noticed right facial droop as well so she called the NH staff who confirmed that these are new changes so EMS was called. He does not speak much but there was no change from baseline. No arm/leg weakness. He had breakfast this morning with no difficulty swallowing.  In ER, he was Code Stroke. NIH 1. Not a candidate for Tenecteplase. CT Scans with no acute findings.     Stroke risk factors:    PAST MEDICAL & SURGICAL HISTORY:  Dementia      PAD (peripheral artery disease)      HTN (hypertension)      HLD (hyperlipidemia)      Status post arterial stent      History of left knee replacement          MEDICATIONS  (STANDING):  aspirin  chewable 81 milliGRAM(s) Oral daily  atorvastatin 80 milliGRAM(s) Oral at bedtime  cefTRIAXone Injectable. 1000 milliGRAM(s) IV Push every 24 hours  chlorhexidine 2% Cloths 1 Application(s) Topical <User Schedule>  ciprofloxacin  0.3% Ophthalmic Solution 1 Drop(s) Right EYE every 8 hours  dextrose 5% + sodium chloride 0.45%. 1000 milliLiter(s) (75 mL/Hr) IV Continuous <Continuous>  lactated ringers. 1000 milliLiter(s) (75 mL/Hr) IV Continuous <Continuous>  levothyroxine 75 MICROGram(s) Oral daily  tamsulosin 0.4 milliGRAM(s) Oral at bedtime    MEDICATIONS  (PRN):  acetaminophen  Suppository .. 650 milliGRAM(s) Rectal every 6 hours PRN Temp greater or equal to 38C (100.4F), Mild Pain (1 - 3), Moderate Pain (4 - 6)  haloperidol    Injectable 2.5 milliGRAM(s) IntraMuscular every 6 hours PRN Agitation  ondansetron Injectable 4 milliGRAM(s) IV Push every 6 hours PRN Nausea and/or Vomiting      Allergies    penicillin (Hives; Urticaria)    Intolerances        SOCIAL HISTORY:  no tob,   no alcohol   no drugs    FAMILY HISTORY:  No pertinent family history in first degree relatives          ROS: 14 point ROS negative other than what is present in HPI or below    Vital Signs Last 24 Hrs  T(C): 37.1 (23 Jun 2023 15:12), Max: 37.1 (23 Jun 2023 05:18)  T(F): 98.7 (23 Jun 2023 15:12), Max: 98.7 (23 Jun 2023 05:18)  HR: 63 (23 Jun 2023 15:12) (57 - 68)  BP: 138/77 (23 Jun 2023 15:12) (101/61 - 138/77)  BP(mean): 97 (23 Jun 2023 15:12) (97 - 97)  RR: 18 (23 Jun 2023 15:12) (18 - 19)  SpO2: 98% (23 Jun 2023 15:12) (95% - 98%)    Parameters below as of 23 Jun 2023 15:12  Patient On (Oxygen Delivery Method): nasal cannula  O2 Flow (L/min): 3        General: NAD    Detailed Neurologic Exam:    Mental status: The patient is awake and alert and has normal attention span.  The patient is fully oriented in 3 spheres. The patient is oriented to current events. The patient is able to name objects, follow commands, repeat sentences.    Cranial nerves: Pupils equal and react symmetrically to light. There is no visual field deficit to confrontation. Extraocular motion is full with no nystagmus. There is no ptosis. Facial sensation is intact. Facial musculature is symmetric. Palate elevates symmetrically. Tongue is midline.    Motor: There is normal bulk and tone.  There is no tremor.  Strength is 5/5 in the right arm and leg.   Strength is 5/5 in the left arm and leg.    Sensation: Intact to light touch and pin in 4 extremities    Reflexes: 1-2+ throughout and plantar responses are flexor.    Cerebellar: There is no dysmetria on finger to nose testing.    Gait : deferred    NIH SS:  DATE: 6/23/23  TIME: 16:00  1A: Level of consciousness (0-3): 0  1B: Questions (0-2): 2  1C: Commands (0-2): 2  2: Gaze (0-2): 0  3: Visual fields (0-3): 0  4: Facial palsy (0-3): 1  MOTOR:  5A: Left arm motor drift (0-4): 0  5B: Right arm motor drift (0-4): 0  6A: Left leg motor drift (0-4): 0  6B: Right leg motor drift (0-4): 0  7: Limb ataxia (0-2): 0  SENSORY:  8: Sensation (0-2): 0  SPEECH:  9: Language (0-3): 3  10: Dysarthria (0-2): 1  EXTINCTION:  11: Extinction/inattention (0-2): 0    TOTAL SCORE: 9    prehospital mRS= 5      LABS:                         13.4   10.43 )-----------( 161      ( 23 Jun 2023 02:54 )             40.6       06-23    147<H>  |  107  |  18.5  ----------------------------<  105<H>  4.3   |  28.0  |  0.89    Ca    9.0      23 Jun 2023 02:54  Mg     2.1     06-23    TPro  6.5<L>  /  Alb  3.8  /  TBili  0.5  /  DBili  x   /  AST  14  /  ALT  8   /  AlkPhos  79  06-23    Lipid Profile (06.22.23 @ 02:50)    Cholesterol: 105 mg/dL   Triglycerides, Serum: 104 mg/dL   HDL Cholesterol: 44 mg/dL   Non HDL Cholesterol: 61tients              LDL Cholesterol Calculated: 40 mg/dL    (06.22.23 @ 02:50)    A1C with Estimated Average Glucose Result: 6.0 %   Estimated Average Glucose: 126 mg/dL    RADIOLOGY & ADDITIONAL STUDIES (independently reviewed unless otherwise noted):    CT Brain Stroke Protocol (06.21.23 @ 13:51)   IMPRESSION:  No acute intracranial hemorrhage.    Age-indeterminate lacunar infarct posterior limb right internal capsule.   Artifact versus questionable loss of gray-white matter differentiation in   the bilateral occipital lobes. MRI exam recommended to exclude acute   ischemia.   ER called at 2:00 PM and 2:02 PM on 6/21/2023. Dr. Perez   notified at 2:04 PM    Unremarkable CT perfusion    No hemodynamically significant stenosis    CTA Head and Neck and CT Perfusion 6/21/23  CTA of the neck:  The common carotid and internal carotid arteries are patent. Calcified   atherosclerotic plaque at the right carotid bulb noted  The extracranial vertebral arteries are patent.  Degenerative changes noted. Small thyroid nodules noted    CTA Head:  The proximal anterior, middle and posterior cerebral arteries are patent.  The intracranial vertebral and basilar arteries are patent.  There is no intracranial aneurysm.    CT perfusion:  No core infarct or evidence of delayed mean transit time is identified.     Preliminary note, official note pending attending review/signature.                               [facility] Stroke Team  CC: Right facial weakness  HPI:  77 year old male with history of Dementia, PAD s/p LE Stents x 2, HTN (diet controlled), HLD, Hypothyroidism and BPH brought by EMS on 6/21/23 with right facial droop. History was taken from wife at bedside as patient is unable to provide any information due to his baseline dementia. Stroke consult was called at 6/23/23 at 16:00. Patient cannot provide information, however as per wife, patient lives at a memory center where the nurse woke him up on 6/21/23 at an unknown time and she noticed a right eye weakness. She was unknown if this was contributory due to the patient currently having a right eye infection at this time. The wife came to visit him at 12:00 pm, where she noticed a right facial weakness and EMS was called. On presentation, patient does not speak much, but no arm or leg weakness was appreciated.     PAST MEDICAL & SURGICAL HISTORY:  Dementia  PAD (peripheral artery disease)  HTN (hypertension)  HLD (hyperlipidemia)  Status post arterial stent  History of left knee replacement      MEDICATIONS  (STANDING):  aspirin  chewable 81 milliGRAM(s) Oral daily  atorvastatin 80 milliGRAM(s) Oral at bedtime  cefTRIAXone Injectable. 1000 milliGRAM(s) IV Push every 24 hours  chlorhexidine 2% Cloths 1 Application(s) Topical <User Schedule>  ciprofloxacin  0.3% Ophthalmic Solution 1 Drop(s) Right EYE every 8 hours  dextrose 5% + sodium chloride 0.45%. 1000 milliLiter(s) (75 mL/Hr) IV Continuous <Continuous>  lactated ringers. 1000 milliLiter(s) (75 mL/Hr) IV Continuous <Continuous>  levothyroxine 75 MICROGram(s) Oral daily  tamsulosin 0.4 milliGRAM(s) Oral at bedtime    MEDICATIONS  (PRN):  acetaminophen  Suppository .. 650 milliGRAM(s) Rectal every 6 hours PRN Temp greater or equal to 38C (100.4F), Mild Pain (1 - 3), Moderate Pain (4 - 6)  haloperidol    Injectable 2.5 milliGRAM(s) IntraMuscular every 6 hours PRN Agitation  ondansetron Injectable 4 milliGRAM(s) IV Push every 6 hours PRN Nausea and/or Vomiting      Allergies  penicillin (Hives; Urticaria)    SOCIAL HISTORY:  no tob,   no alcohol   no drugs    FAMILY HISTORY:  No pertinent family history in first degree relatives    ROS: 14 point ROS negative other than what is present in HPI or below    Vital Signs Last 24 Hrs  T(C): 37.1 (23 Jun 2023 15:12), Max: 37.1 (23 Jun 2023 05:18)  T(F): 98.7 (23 Jun 2023 15:12), Max: 98.7 (23 Jun 2023 05:18)  HR: 63 (23 Jun 2023 15:12) (57 - 68)  BP: 138/77 (23 Jun 2023 15:12) (101/61 - 138/77)  BP(mean): 97 (23 Jun 2023 15:12) (97 - 97)  RR: 18 (23 Jun 2023 15:12) (18 - 19)  SpO2: 98% (23 Jun 2023 15:12) (95% - 98%)    Parameters below as of 23 Jun 2023 15:12  Patient On (Oxygen Delivery Method): nasal cannula  O2 Flow (L/min): 3    General: NAD    Detailed Neurologic Exam:    Mental status: The patient is awake and alert and has normal attention span.  The patient is fully oriented in 3 spheres. The patient is oriented to current events. The patient is able to name objects, follow commands, repeat sentences.    Cranial nerves: Pupils equal and react symmetrically to light. There is no visual field deficit to confrontation. Extraocular motion is full with no nystagmus. There is no ptosis. Facial sensation is intact. Facial musculature is symmetric. Palate elevates symmetrically. Tongue is midline.    Motor: There is normal bulk and tone.  There is no tremor.  Strength is 5/5 in the right arm and leg.   Strength is 5/5 in the left arm and leg.    Sensation: Intact to light touch and pin in 4 extremities    Reflexes: 1-2+ throughout and plantar responses are flexor.    Cerebellar: There is no dysmetria on finger to nose testing.    Gait : deferred    NIH SS:  DATE: 6/23/23  TIME: 16:00  1A: Level of consciousness (0-3): 0  1B: Questions (0-2): 2  1C: Commands (0-2): 2  2: Gaze (0-2): 0  3: Visual fields (0-3): 0  4: Facial palsy (0-3): 1  MOTOR:  5A: Left arm motor drift (0-4): 0  5B: Right arm motor drift (0-4): 0  6A: Left leg motor drift (0-4): 0  6B: Right leg motor drift (0-4): 0  7: Limb ataxia (0-2): 0  SENSORY:  8: Sensation (0-2): 0  SPEECH:  9: Language (0-3): 3  10: Dysarthria (0-2): 1  EXTINCTION:  11: Extinction/inattention (0-2): 0    TOTAL SCORE: 9    prehospital mRS= 5      LABS:                         13.4   10.43 )-----------( 161      ( 23 Jun 2023 02:54 )             40.6       06-23    147<H>  |  107  |  18.5  ----------------------------<  105<H>  4.3   |  28.0  |  0.89    Ca    9.0      23 Jun 2023 02:54  Mg     2.1     06-23    TPro  6.5<L>  /  Alb  3.8  /  TBili  0.5  /  DBili  x   /  AST  14  /  ALT  8   /  AlkPhos  79  06-23    Lipid Profile (06.22.23 @ 02:50)    Cholesterol: 105 mg/dL   Triglycerides, Serum: 104 mg/dL   HDL Cholesterol: 44 mg/dL   Non HDL Cholesterol: 61tients              LDL Cholesterol Calculated: 40 mg/dL    (06.22.23 @ 02:50)    A1C with Estimated Average Glucose Result: 6.0 %   Estimated Average Glucose: 126 mg/dL    RADIOLOGY & ADDITIONAL STUDIES (independently reviewed unless otherwise noted):    CT Brain Stroke Protocol (06.21.23 @ 13:51)   IMPRESSION:  No acute intracranial hemorrhage.    Age-indeterminate lacunar infarct posterior limb right internal capsule.   Artifact versus questionable loss of gray-white matter differentiation in   the bilateral occipital lobes. MRI exam recommended to exclude acute   ischemia.   ER called at 2:00 PM and 2:02 PM on 6/21/2023. Dr. Perez   notified at 2:04 PM    Unremarkable CT perfusion    No hemodynamically significant stenosis    CTA Head and Neck and CT Perfusion 6/21/23  CTA of the neck:  The common carotid and internal carotid arteries are patent. Calcified   atherosclerotic plaque at the right carotid bulb noted  The extracranial vertebral arteries are patent.  Degenerative changes noted. Small thyroid nodules noted    CTA Head:  The proximal anterior, middle and posterior cerebral arteries are patent.  The intracranial vertebral and basilar arteries are patent.  There is no intracranial aneurysm.    CT perfusion:  No core infarct or evidence of delayed mean transit time is identified.     Preliminary note, official note pending attending review/signature.                               [facility] Stroke Team  CC: Right facial weakness  HPI:  77 year old male with history of Dementia, PAD s/p LE Stents x 2, HTN (diet controlled), HLD, Hypothyroidism and BPH brought by EMS on 6/21/23 with right facial droop. History was taken from wife at bedside as patient is unable to provide any information due to his baseline dementia. Stroke consult was called at 6/23/23 at 16:00. Patient cannot provide information, however as per wife, patient lives at a memory center where the nurse woke him up on 6/21/23 at an unknown time and she noticed a right eye weakness. She was unknown if this was contributory due to the patient currently having a right eye infection at this time. The wife came to visit him at 12:00 pm, where she noticed a right facial weakness and EMS was called. On presentation, patient does not speak much, but no arm or leg weakness was appreciated.     PAST MEDICAL & SURGICAL HISTORY:  Dementia  PAD (peripheral artery disease)  HTN (hypertension)  HLD (hyperlipidemia)  Status post arterial stent  History of left knee replacement      MEDICATIONS  (STANDING):  aspirin  chewable 81 milliGRAM(s) Oral daily  atorvastatin 80 milliGRAM(s) Oral at bedtime  cefTRIAXone Injectable. 1000 milliGRAM(s) IV Push every 24 hours  chlorhexidine 2% Cloths 1 Application(s) Topical <User Schedule>  ciprofloxacin  0.3% Ophthalmic Solution 1 Drop(s) Right EYE every 8 hours  dextrose 5% + sodium chloride 0.45%. 1000 milliLiter(s) (75 mL/Hr) IV Continuous <Continuous>  lactated ringers. 1000 milliLiter(s) (75 mL/Hr) IV Continuous <Continuous>  levothyroxine 75 MICROGram(s) Oral daily  tamsulosin 0.4 milliGRAM(s) Oral at bedtime    MEDICATIONS  (PRN):  acetaminophen  Suppository .. 650 milliGRAM(s) Rectal every 6 hours PRN Temp greater or equal to 38C (100.4F), Mild Pain (1 - 3), Moderate Pain (4 - 6)  haloperidol    Injectable 2.5 milliGRAM(s) IntraMuscular every 6 hours PRN Agitation  ondansetron Injectable 4 milliGRAM(s) IV Push every 6 hours PRN Nausea and/or Vomiting      Allergies  penicillin (Hives; Urticaria)    SOCIAL HISTORY:  no tob,   no alcohol   no drugs    FAMILY HISTORY:  No pertinent family history in first degree relatives    ROS: 14 point ROS negative other than what is present in HPI or below    Vital Signs Last 24 Hrs  T(C): 37.1 (23 Jun 2023 15:12), Max: 37.1 (23 Jun 2023 05:18)  T(F): 98.7 (23 Jun 2023 15:12), Max: 98.7 (23 Jun 2023 05:18)  HR: 63 (23 Jun 2023 15:12) (57 - 68)  BP: 138/77 (23 Jun 2023 15:12) (101/61 - 138/77)  BP(mean): 97 (23 Jun 2023 15:12) (97 - 97)  RR: 18 (23 Jun 2023 15:12) (18 - 19)  SpO2: 98% (23 Jun 2023 15:12) (95% - 98%)    Parameters below as of 23 Jun 2023 15:12  Patient On (Oxygen Delivery Method): nasal cannula  O2 Flow (L/min): 3    General: NAD    Detailed Neurologic Exam:    Mental status: The patient is awake and alert and has normal attention span.  The patient is fully oriented in 3 spheres. The patient is oriented to current events. The patient is able to name objects, follow commands, repeat sentences.    Cranial nerves: Pupils equal and react symmetrically to light. There is no visual field deficit to confrontation. Extraocular motion is full with no nystagmus. There is no ptosis. Facial sensation is intact. Facial musculature is symmetric. Palate elevates symmetrically. Tongue is midline.    Motor: There is normal bulk and tone.  There is no tremor.  Strength is 5/5 in the right arm and leg.   Strength is 5/5 in the left arm and leg.    Sensation: Intact to light touch and pin in 4 extremities    Reflexes: 1-2+ throughout and plantar responses are flexor.    Cerebellar: There is no dysmetria on finger to nose testing.    Gait : deferred    NIH SS:  DATE: 6/23/23  TIME: 16:00  1A: Level of consciousness (0-3): 0  1B: Questions (0-2): 2  1C: Commands (0-2): 2  2: Gaze (0-2): 0  3: Visual fields (0-3): 0  4: Facial palsy (0-3): 1  MOTOR:  5A: Left arm motor drift (0-4): 0  5B: Right arm motor drift (0-4): 0  6A: Left leg motor drift (0-4): 0  6B: Right leg motor drift (0-4): 0  7: Limb ataxia (0-2): 0  SENSORY:  8: Sensation (0-2): 0  SPEECH:  9: Language (0-3): 3  10: Dysarthria (0-2): 1  EXTINCTION:  11: Extinction/inattention (0-2): 0    TOTAL SCORE: 9    prehospital mRS= 5      LABS:                         13.4   10.43 )-----------( 161      ( 23 Jun 2023 02:54 )             40.6       06-23    147<H>  |  107  |  18.5  ----------------------------<  105<H>  4.3   |  28.0  |  0.89    Ca    9.0      23 Jun 2023 02:54  Mg     2.1     06-23    TPro  6.5<L>  /  Alb  3.8  /  TBili  0.5  /  DBili  x   /  AST  14  /  ALT  8   /  AlkPhos  79  06-23    Lipid Profile (06.22.23 @ 02:50)    Cholesterol: 105 mg/dL   Triglycerides, Serum: 104 mg/dL   HDL Cholesterol: 44 mg/dL   Non HDL Cholesterol: 61tients              LDL Cholesterol Calculated: 40 mg/dL    (06.22.23 @ 02:50)    A1C with Estimated Average Glucose Result: 6.0 %   Estimated Average Glucose: 126 mg/dL    RADIOLOGY & ADDITIONAL STUDIES (independently reviewed unless otherwise noted):    CT Brain Stroke Protocol (06.21.23 @ 13:51)   IMPRESSION:  No acute intracranial hemorrhage.    Age-indeterminate lacunar infarct posterior limb right internal capsule.   Artifact versus questionable loss of gray-white matter differentiation in   the bilateral occipital lobes. MRI exam recommended to exclude acute   ischemia.   ER called at 2:00 PM and 2:02 PM on 6/21/2023. Dr. Perez   notified at 2:04 PM    Unremarkable CT perfusion    No hemodynamically significant stenosis    CTA Head and Neck and CT Perfusion 6/21/23  CTA of the neck:  The common carotid and internal carotid arteries are patent. Calcified   atherosclerotic plaque at the right carotid bulb noted  The extracranial vertebral arteries are patent.  Degenerative changes noted. Small thyroid nodules noted    CTA Head:  The proximal anterior, middle and posterior cerebral arteries are patent.  The intracranial vertebral and basilar arteries are patent.  There is no intracranial aneurysm.    CT perfusion:  No core infarct or evidence of delayed mean transit time is identified.     Preliminary note, official note pending attending review/signature.                               [facility] Stroke Team  CC: Right facial weakness  HPI:  77 year old male with history of Dementia, PAD s/p LE Stents x 2, HTN (diet controlled), HLD, Hypothyroidism and BPH brought by EMS on 6/21/23 with right facial droop. History was taken from wife at bedside as patient is unable to provide any information due to his baseline dementia. Stroke consult was called at 6/23/23 at 16:00. Patient cannot provide information, however as per wife, patient lives at a memory center where the nurse woke him up on 6/21/23 at an unknown time and she noticed a right eye weakness. She was unknown if this was contributory due to the patient currently having a right eye infection at this time. The wife came to visit him at 12:00 pm, where she noticed a right facial weakness and EMS was called. On presentation, patient does not speak much, but no arm or leg weakness was appreciated.     PAST MEDICAL & SURGICAL HISTORY:  Dementia  PAD (peripheral artery disease)  HTN (hypertension)  HLD (hyperlipidemia)  Status post arterial stent  History of left knee replacement      MEDICATIONS  (STANDING):  aspirin  chewable 81 milliGRAM(s) Oral daily  atorvastatin 80 milliGRAM(s) Oral at bedtime  cefTRIAXone Injectable. 1000 milliGRAM(s) IV Push every 24 hours  chlorhexidine 2% Cloths 1 Application(s) Topical <User Schedule>  ciprofloxacin  0.3% Ophthalmic Solution 1 Drop(s) Right EYE every 8 hours  dextrose 5% + sodium chloride 0.45%. 1000 milliLiter(s) (75 mL/Hr) IV Continuous <Continuous>  lactated ringers. 1000 milliLiter(s) (75 mL/Hr) IV Continuous <Continuous>  levothyroxine 75 MICROGram(s) Oral daily  tamsulosin 0.4 milliGRAM(s) Oral at bedtime    MEDICATIONS  (PRN):  acetaminophen  Suppository .. 650 milliGRAM(s) Rectal every 6 hours PRN Temp greater or equal to 38C (100.4F), Mild Pain (1 - 3), Moderate Pain (4 - 6)  haloperidol    Injectable 2.5 milliGRAM(s) IntraMuscular every 6 hours PRN Agitation  ondansetron Injectable 4 milliGRAM(s) IV Push every 6 hours PRN Nausea and/or Vomiting      Allergies  penicillin (Hives; Urticaria)    SOCIAL HISTORY:  no tob,   no alcohol   no drugs    FAMILY HISTORY:  No pertinent family history in first degree relatives    ROS: 14 point ROS negative other than what is present in HPI or below    Vital Signs Last 24 Hrs  T(C): 37.1 (23 Jun 2023 15:12), Max: 37.1 (23 Jun 2023 05:18)  T(F): 98.7 (23 Jun 2023 15:12), Max: 98.7 (23 Jun 2023 05:18)  HR: 63 (23 Jun 2023 15:12) (57 - 68)  BP: 138/77 (23 Jun 2023 15:12) (101/61 - 138/77)  BP(mean): 97 (23 Jun 2023 15:12) (97 - 97)  RR: 18 (23 Jun 2023 15:12) (18 - 19)  SpO2: 98% (23 Jun 2023 15:12) (95% - 98%)    Parameters below as of 23 Jun 2023 15:12  Patient On (Oxygen Delivery Method): nasal cannula  O2 Flow (L/min): 3    General: NAD    Detailed Neurologic Exam:    Mental status: The patient is awake and alert and has normal attention span.  The patient is fully oriented in 3 spheres. The patient is oriented to current events. The patient is able to name objects, follow commands, repeat sentences.    Cranial nerves: Pupils equal and react symmetrically to light. There is no visual field deficit to confrontation. Extraocular motion is full with no nystagmus. There is no ptosis. Facial sensation is intact. Facial musculature is symmetric. Palate elevates symmetrically. Tongue is midline.    Motor: There is normal bulk and tone.  There is no tremor.  Strength is 5/5 in the right arm and leg.   Strength is 5/5 in the left arm and leg.    Sensation: Intact to light touch and pin in 4 extremities    Reflexes: 1-2+ throughout and plantar responses are flexor.    Cerebellar: There is no dysmetria on finger to nose testing.    Gait : deferred    NIH SS:  DATE: 6/23/23  TIME: 16:00  1A: Level of consciousness (0-3): 0  1B: Questions (0-2): 2  1C: Commands (0-2): 2  2: Gaze (0-2): 0  3: Visual fields (0-3): 0  4: Facial palsy (0-3): 1  MOTOR:  5A: Left arm motor drift (0-4): 0  5B: Right arm motor drift (0-4): 0  6A: Left leg motor drift (0-4): 0  6B: Right leg motor drift (0-4): 0  7: Limb ataxia (0-2): 0  SENSORY:  8: Sensation (0-2): 0  SPEECH:  9: Language (0-3): 3  10: Dysarthria (0-2): 1  EXTINCTION:  11: Extinction/inattention (0-2): 0    TOTAL SCORE: 9    prehospital mRS= 5      LABS:                         13.4   10.43 )-----------( 161      ( 23 Jun 2023 02:54 )             40.6       06-23    147<H>  |  107  |  18.5  ----------------------------<  105<H>  4.3   |  28.0  |  0.89    Ca    9.0      23 Jun 2023 02:54  Mg     2.1     06-23    TPro  6.5<L>  /  Alb  3.8  /  TBili  0.5  /  DBili  x   /  AST  14  /  ALT  8   /  AlkPhos  79  06-23    Lipid Profile (06.22.23 @ 02:50)    Cholesterol: 105 mg/dL   Triglycerides, Serum: 104 mg/dL   HDL Cholesterol: 44 mg/dL   Non HDL Cholesterol: 61tients              LDL Cholesterol Calculated: 40 mg/dL    (06.22.23 @ 02:50)    A1C with Estimated Average Glucose Result: 6.0 %   Estimated Average Glucose: 126 mg/dL    RADIOLOGY & ADDITIONAL STUDIES (independently reviewed unless otherwise noted):    CT Brain Stroke Protocol (06.21.23 @ 13:51)   IMPRESSION:  No acute intracranial hemorrhage.    Age-indeterminate lacunar infarct posterior limb right internal capsule.   Artifact versus questionable loss of gray-white matter differentiation in   the bilateral occipital lobes. MRI exam recommended to exclude acute   ischemia.   ER called at 2:00 PM and 2:02 PM on 6/21/2023. Dr. Perez   notified at 2:04 PM    Unremarkable CT perfusion    No hemodynamically significant stenosis    CTA Head and Neck and CT Perfusion 6/21/23  CTA of the neck:  The common carotid and internal carotid arteries are patent. Calcified   atherosclerotic plaque at the right carotid bulb noted  The extracranial vertebral arteries are patent.  Degenerative changes noted. Small thyroid nodules noted    CTA Head:  The proximal anterior, middle and posterior cerebral arteries are patent.  The intracranial vertebral and basilar arteries are patent.  There is no intracranial aneurysm.    CT perfusion:  No core infarct or evidence of delayed mean transit time is identified.     Preliminary note, official note pending attending review/signature.                               [facility] Stroke Team  CC: Right facial weakness  HPI:  77 year old male with history of Dementia, PAD s/p LE Stents x 2, HTN (diet controlled), HLD, Hypothyroidism and BPH brought by EMS on 6/21/23 with right facial weakness. History was taken from wife at bedside as patient is unable to provide any information due to his baseline dementia. Stroke consult was called at 6/23/23 at 16:00. Patient cannot provide information, however as per wife, patient lives at a memory center where the nurse woke him up on 6/21/23 at an unknown time in the morning and she noticed a right eye weakness. Last known well unknown. Patient does currently have a right eye infection, therefore she didn't know if it was due to this. The wife came to visit him at 12:00 pm, where she noticed a right facial weakness and EMS was called. On presentation, patient does not speak much, but no arm or leg weakness was appreciated.     PAST MEDICAL & SURGICAL HISTORY:  Dementia  PAD (peripheral artery disease)  HTN (hypertension)  HLD (hyperlipidemia)  Status post arterial stent  History of left knee replacement      MEDICATIONS  (STANDING):  aspirin  chewable 81 milliGRAM(s) Oral daily  atorvastatin 80 milliGRAM(s) Oral at bedtime  cefTRIAXone Injectable. 1000 milliGRAM(s) IV Push every 24 hours  chlorhexidine 2% Cloths 1 Application(s) Topical <User Schedule>  ciprofloxacin  0.3% Ophthalmic Solution 1 Drop(s) Right EYE every 8 hours  dextrose 5% + sodium chloride 0.45%. 1000 milliLiter(s) (75 mL/Hr) IV Continuous <Continuous>  lactated ringers. 1000 milliLiter(s) (75 mL/Hr) IV Continuous <Continuous>  levothyroxine 75 MICROGram(s) Oral daily  tamsulosin 0.4 milliGRAM(s) Oral at bedtime    MEDICATIONS  (PRN):  acetaminophen  Suppository .. 650 milliGRAM(s) Rectal every 6 hours PRN Temp greater or equal to 38C (100.4F), Mild Pain (1 - 3), Moderate Pain (4 - 6)  haloperidol    Injectable 2.5 milliGRAM(s) IntraMuscular every 6 hours PRN Agitation  ondansetron Injectable 4 milliGRAM(s) IV Push every 6 hours PRN Nausea and/or Vomiting      Allergies  penicillin (Hives; Urticaria)    SOCIAL HISTORY:  no tob,   no alcohol   no drugs    FAMILY HISTORY:  No pertinent family history in first degree relatives    ROS: 14 point ROS negative other than what is present in HPI or below    Vital Signs Last 24 Hrs  T(C): 37.1 (23 Jun 2023 15:12), Max: 37.1 (23 Jun 2023 05:18)  T(F): 98.7 (23 Jun 2023 15:12), Max: 98.7 (23 Jun 2023 05:18)  HR: 63 (23 Jun 2023 15:12) (57 - 68)  BP: 138/77 (23 Jun 2023 15:12) (101/61 - 138/77)  BP(mean): 97 (23 Jun 2023 15:12) (97 - 97)  RR: 18 (23 Jun 2023 15:12) (18 - 19)  SpO2: 98% (23 Jun 2023 15:12) (95% - 98%)    Parameters below as of 23 Jun 2023 15:12  Patient On (Oxygen Delivery Method): nasal cannula  O2 Flow (L/min): 3    General: NAD    Detailed Neurologic Exam:    Mental status: Patient is awake with limited verbal output. Patient mumbles few words that do not make sense. Dysarthria noted.  Unable to orientate to self, place, or time. Unable to repeat, follow commands, or repeat. Patient does have dementia and as per wife this is his baseline of mental status.     Cranial nerves:  There is no ptosis. Facial sensation is intact. Facial musculature shows right sided asymmetry.    Motor: There is normal bulk and tone.  There is no tremor. Moves all 4 extremities spontaneously antigravity but not with following commands.  Strength is 5/5 in the right arm and leg.   Strength is 5/5 in the left arm and leg.    Sensation: Intact to light touch in all 4 extremities    Cerebellar: Unable to access due to mental status    Gait : deferred    NIH SS: 9  DATE: 6/23/23  TIME: 16:00  1A: Level of consciousness (0-3): 0  1B: Questions (0-2): 2  1C: Commands (0-2): 2  2: Gaze (0-2): 0  3: Visual fields (0-3): 0  4: Facial palsy (0-3): 1  MOTOR:  5A: Left arm motor drift (0-4): 0  5B: Right arm motor drift (0-4): 0  6A: Left leg motor drift (0-4): 0  6B: Right leg motor drift (0-4): 0  7: Limb ataxia (0-2): 0  SENSORY:  8: Sensation (0-2): 0  SPEECH:  9: Language (0-3): 3  10: Dysarthria (0-2): 1  EXTINCTION:  11: Extinction/inattention (0-2): 0    TOTAL SCORE: 9    prehospital mRS= 5      LABS:                         13.4   10.43 )-----------( 161      ( 23 Jun 2023 02:54 )             40.6       06-23    147<H>  |  107  |  18.5  ----------------------------<  105<H>  4.3   |  28.0  |  0.89    Ca    9.0      23 Jun 2023 02:54  Mg     2.1     06-23    TPro  6.5<L>  /  Alb  3.8  /  TBili  0.5  /  DBili  x   /  AST  14  /  ALT  8   /  AlkPhos  79  06-23    Lipid Profile (06.22.23 @ 02:50)    Cholesterol: 105 mg/dL   Triglycerides, Serum: 104 mg/dL   HDL Cholesterol: 44 mg/dL   Non HDL Cholesterol: 61tients              LDL Cholesterol Calculated: 40 mg/dL    (06.22.23 @ 02:50)    A1C with Estimated Average Glucose Result: 6.0 %   Estimated Average Glucose: 126 mg/dL    RADIOLOGY & ADDITIONAL STUDIES (independently reviewed unless otherwise noted):    CT Brain Stroke Protocol (06.21.23 @ 13:51)   IMPRESSION:  No acute intracranial hemorrhage.    Age-indeterminate lacunar infarct posterior limb right internal capsule.   Artifact versus questionable loss of gray-white matter differentiation in   the bilateral occipital lobes. MRI exam recommended to exclude acute   ischemia.   ER called at 2:00 PM and 2:02 PM on 6/21/2023. Dr. Perez   notified at 2:04 PM    Unremarkable CT perfusion    No hemodynamically significant stenosis    CTA Head and Neck and CT Perfusion 6/21/23  CTA of the neck:  The common carotid and internal carotid arteries are patent. Calcified   atherosclerotic plaque at the right carotid bulb noted  The extracranial vertebral arteries are patent.  Degenerative changes noted. Small thyroid nodules noted    CTA Head:  The proximal anterior, middle and posterior cerebral arteries are patent.  The intracranial vertebral and basilar arteries are patent.  There is no intracranial aneurysm.    CT perfusion:  No core infarct or evidence of delayed mean transit time is identified.     Preliminary note, official note pending attending review/signature.                               [facility] Stroke Team  CC: Right facial weakness  HPI:  77 year old male with history of Dementia, PAD s/p LE Stents x 2, HTN (diet controlled), HLD, Hypothyroidism and BPH brought by EMS on 6/21/23 with right facial weakness. History was taken from wife at bedside as patient is unable to provide any information due to his baseline dementia. Stroke consult was called at 6/23/23 at 16:00. Patient cannot provide information, however as per wife, patient lives at a memory center where the nurse woke him up on 6/21/23 at an unknown time in the morning and she noticed a right eye weakness. Last known well unknown. Patient does currently have a right eye infection, therefore she didn't know if it was due to this. The wife came to visit him at 12:00 pm, where she noticed a right facial weakness and EMS was called. On presentation, patient does not speak much, but no arm or leg weakness was appreciated.     PAST MEDICAL & SURGICAL HISTORY:  Dementia  PAD (peripheral artery disease)  HTN (hypertension)  HLD (hyperlipidemia)  Status post arterial stent  History of left knee replacement      MEDICATIONS  (STANDING):  aspirin  chewable 81 milliGRAM(s) Oral daily  atorvastatin 80 milliGRAM(s) Oral at bedtime  cefTRIAXone Injectable. 1000 milliGRAM(s) IV Push every 24 hours  chlorhexidine 2% Cloths 1 Application(s) Topical <User Schedule>  ciprofloxacin  0.3% Ophthalmic Solution 1 Drop(s) Right EYE every 8 hours  dextrose 5% + sodium chloride 0.45%. 1000 milliLiter(s) (75 mL/Hr) IV Continuous <Continuous>  lactated ringers. 1000 milliLiter(s) (75 mL/Hr) IV Continuous <Continuous>  levothyroxine 75 MICROGram(s) Oral daily  tamsulosin 0.4 milliGRAM(s) Oral at bedtime    MEDICATIONS  (PRN):  acetaminophen  Suppository .. 650 milliGRAM(s) Rectal every 6 hours PRN Temp greater or equal to 38C (100.4F), Mild Pain (1 - 3), Moderate Pain (4 - 6)  haloperidol    Injectable 2.5 milliGRAM(s) IntraMuscular every 6 hours PRN Agitation  ondansetron Injectable 4 milliGRAM(s) IV Push every 6 hours PRN Nausea and/or Vomiting      Allergies  penicillin (Hives; Urticaria)    SOCIAL HISTORY:  no tob,   no alcohol   no drugs    FAMILY HISTORY:  No pertinent family history in first degree relatives    ROS: 14 point ROS negative other than what is present in HPI or below    Vital Signs Last 24 Hrs  T(C): 37.1 (23 Jun 2023 15:12), Max: 37.1 (23 Jun 2023 05:18)  T(F): 98.7 (23 Jun 2023 15:12), Max: 98.7 (23 Jun 2023 05:18)  HR: 63 (23 Jun 2023 15:12) (57 - 68)  BP: 138/77 (23 Jun 2023 15:12) (101/61 - 138/77)  BP(mean): 97 (23 Jun 2023 15:12) (97 - 97)  RR: 18 (23 Jun 2023 15:12) (18 - 19)  SpO2: 98% (23 Jun 2023 15:12) (95% - 98%)    Parameters below as of 23 Jun 2023 15:12  Patient On (Oxygen Delivery Method): nasal cannula  O2 Flow (L/min): 3    General: NAD    Detailed Neurologic Exam:    Mental status: Patient is awake with limited verbal output. Patient mumbles few words that do not make sense. Dysarthria noted.  Unable to orientate to self, place, or time. Unable to repeat, follow commands, or repeat. Patient does have dementia and as per wife this is his baseline of mental status.     Cranial nerves:  There is no ptosis. Facial sensation is intact. Facial musculature shows right sided asymmetry.    Motor: There is normal bulk and tone.  There is no tremor. Moves all 4 extremities spontaneously antigravity but not with following commands.  Strength is 5/5 in the right arm and leg.   Strength is 5/5 in the left arm and leg.    Sensation: Intact to light touch in all 4 extremities    Cerebellar: Unable to access due to mental status    Gait : deferred    NIH SS: 9  DATE: 6/23/23  TIME: 16:00  1A: Level of consciousness (0-3): 0  1B: Questions (0-2): 2  1C: Commands (0-2): 2  2: Gaze (0-2): 0  3: Visual fields (0-3): 0  4: Facial palsy (0-3): 1  MOTOR:  5A: Left arm motor drift (0-4): 0  5B: Right arm motor drift (0-4): 0  6A: Left leg motor drift (0-4): 0  6B: Right leg motor drift (0-4): 0  7: Limb ataxia (0-2): 0  SENSORY:  8: Sensation (0-2): 0  SPEECH:  9: Language (0-3): 3  10: Dysarthria (0-2): 1  EXTINCTION:  11: Extinction/inattention (0-2): 0    TOTAL SCORE: 9    prehospital mRS= 5      LABS:                         13.4   10.43 )-----------( 161      ( 23 Jun 2023 02:54 )             40.6       06-23    147<H>  |  107  |  18.5  ----------------------------<  105<H>  4.3   |  28.0  |  0.89    Ca    9.0      23 Jun 2023 02:54  Mg     2.1     06-23    TPro  6.5<L>  /  Alb  3.8  /  TBili  0.5  /  DBili  x   /  AST  14  /  ALT  8   /  AlkPhos  79  06-23    Lipid Profile (06.22.23 @ 02:50)    Cholesterol: 105 mg/dL   Triglycerides, Serum: 104 mg/dL   HDL Cholesterol: 44 mg/dL   Non HDL Cholesterol: 61tients              LDL Cholesterol Calculated: 40 mg/dL    (06.22.23 @ 02:50)    A1C with Estimated Average Glucose Result: 6.0 %   Estimated Average Glucose: 126 mg/dL    RADIOLOGY & ADDITIONAL STUDIES (independently reviewed unless otherwise noted):    CT Brain Stroke Protocol (06.21.23 @ 13:51)   IMPRESSION:  No acute intracranial hemorrhage.    Age-indeterminate lacunar infarct posterior limb right internal capsule.   Artifact versus questionable loss of gray-white matter differentiation in   the bilateral occipital lobes. MRI exam recommended to exclude acute   ischemia.   ER called at 2:00 PM and 2:02 PM on 6/21/2023. Dr. Perez   notified at 2:04 PM    Unremarkable CT perfusion    No hemodynamically significant stenosis    CTA Head and Neck and CT Perfusion 6/21/23  CTA of the neck:  The common carotid and internal carotid arteries are patent. Calcified   atherosclerotic plaque at the right carotid bulb noted  The extracranial vertebral arteries are patent.  Degenerative changes noted. Small thyroid nodules noted    CTA Head:  The proximal anterior, middle and posterior cerebral arteries are patent.  The intracranial vertebral and basilar arteries are patent.  There is no intracranial aneurysm.    CT perfusion:  No core infarct or evidence of delayed mean transit time is identified.     Preliminary note, official note pending attending review/signature.                               [facility] Stroke Team  CC: Right facial weakness  HPI:  77 year old male with history of Dementia, PAD s/p LE Stents x 2, HTN (diet controlled), HLD, Hypothyroidism and BPH brought by EMS on 6/21/23 with right facial weakness. History was taken from wife at bedside as patient is unable to provide any information due to his baseline dementia. Stroke consult was called at 6/23/23 at 16:00. Patient cannot provide information, however as per wife, patient lives at a memory center where the nurse woke him up on 6/21/23 at an unknown time in the morning and she noticed a right eye weakness. Last known well unknown. Patient does currently have a right eye infection, therefore she didn't know if it was due to this. The wife came to visit him at 12:00 pm, where she noticed a right facial weakness and EMS was called. On presentation, patient does not speak much, but no arm or leg weakness was appreciated.     PAST MEDICAL & SURGICAL HISTORY:  Dementia  PAD (peripheral artery disease)  HTN (hypertension)  HLD (hyperlipidemia)  Status post arterial stent  History of left knee replacement      MEDICATIONS  (STANDING):  aspirin  chewable 81 milliGRAM(s) Oral daily  atorvastatin 80 milliGRAM(s) Oral at bedtime  cefTRIAXone Injectable. 1000 milliGRAM(s) IV Push every 24 hours  chlorhexidine 2% Cloths 1 Application(s) Topical <User Schedule>  ciprofloxacin  0.3% Ophthalmic Solution 1 Drop(s) Right EYE every 8 hours  dextrose 5% + sodium chloride 0.45%. 1000 milliLiter(s) (75 mL/Hr) IV Continuous <Continuous>  lactated ringers. 1000 milliLiter(s) (75 mL/Hr) IV Continuous <Continuous>  levothyroxine 75 MICROGram(s) Oral daily  tamsulosin 0.4 milliGRAM(s) Oral at bedtime    MEDICATIONS  (PRN):  acetaminophen  Suppository .. 650 milliGRAM(s) Rectal every 6 hours PRN Temp greater or equal to 38C (100.4F), Mild Pain (1 - 3), Moderate Pain (4 - 6)  haloperidol    Injectable 2.5 milliGRAM(s) IntraMuscular every 6 hours PRN Agitation  ondansetron Injectable 4 milliGRAM(s) IV Push every 6 hours PRN Nausea and/or Vomiting      Allergies  penicillin (Hives; Urticaria)    SOCIAL HISTORY:  no tob,   no alcohol   no drugs    FAMILY HISTORY:  No pertinent family history in first degree relatives    ROS: 14 point ROS negative other than what is present in HPI or below    Vital Signs Last 24 Hrs  T(C): 37.1 (23 Jun 2023 15:12), Max: 37.1 (23 Jun 2023 05:18)  T(F): 98.7 (23 Jun 2023 15:12), Max: 98.7 (23 Jun 2023 05:18)  HR: 63 (23 Jun 2023 15:12) (57 - 68)  BP: 138/77 (23 Jun 2023 15:12) (101/61 - 138/77)  BP(mean): 97 (23 Jun 2023 15:12) (97 - 97)  RR: 18 (23 Jun 2023 15:12) (18 - 19)  SpO2: 98% (23 Jun 2023 15:12) (95% - 98%)    Parameters below as of 23 Jun 2023 15:12  Patient On (Oxygen Delivery Method): nasal cannula  O2 Flow (L/min): 3    General: NAD    Detailed Neurologic Exam:    Mental status: Patient is awake with limited verbal output. Patient mumbles few words that do not make sense. Dysarthria noted.  Unable to orientate to self, place, or time. Unable to repeat, follow commands, or repeat. Patient does have dementia and as per wife this is his baseline of mental status.     Cranial nerves:  There is no ptosis. Facial sensation is intact. Facial musculature shows right sided asymmetry.    Motor: There is normal bulk and tone.  There is no tremor. Moves all 4 extremities spontaneously antigravity but not with following commands.  Strength is 5/5 in the right arm and leg.   Strength is 5/5 in the left arm and leg.    Sensation: Intact to light touch in all 4 extremities    Cerebellar: Unable to access due to mental status    Gait : deferred    NIH SS: 9  DATE: 6/23/23  TIME: 16:00  1A: Level of consciousness (0-3): 0  1B: Questions (0-2): 2  1C: Commands (0-2): 2  2: Gaze (0-2): 0  3: Visual fields (0-3): 0  4: Facial palsy (0-3): 1  MOTOR:  5A: Left arm motor drift (0-4): 0  5B: Right arm motor drift (0-4): 0  6A: Left leg motor drift (0-4): 0  6B: Right leg motor drift (0-4): 0  7: Limb ataxia (0-2): 0  SENSORY:  8: Sensation (0-2): 0  SPEECH:  9: Language (0-3): 3  10: Dysarthria (0-2): 1  EXTINCTION:  11: Extinction/inattention (0-2): 0    TOTAL SCORE: 9    prehospital mRS= 5      LABS:                         13.4   10.43 )-----------( 161      ( 23 Jun 2023 02:54 )             40.6       06-23    147<H>  |  107  |  18.5  ----------------------------<  105<H>  4.3   |  28.0  |  0.89    Ca    9.0      23 Jun 2023 02:54  Mg     2.1     06-23    TPro  6.5<L>  /  Alb  3.8  /  TBili  0.5  /  DBili  x   /  AST  14  /  ALT  8   /  AlkPhos  79  06-23    Lipid Profile (06.22.23 @ 02:50)    Cholesterol: 105 mg/dL   Triglycerides, Serum: 104 mg/dL   HDL Cholesterol: 44 mg/dL   Non HDL Cholesterol: 61tients              LDL Cholesterol Calculated: 40 mg/dL    (06.22.23 @ 02:50)    A1C with Estimated Average Glucose Result: 6.0 %   Estimated Average Glucose: 126 mg/dL    RADIOLOGY & ADDITIONAL STUDIES (independently reviewed unless otherwise noted):    CT Brain Stroke Protocol (06.21.23 @ 13:51)   IMPRESSION:  No acute intracranial hemorrhage.    Age-indeterminate lacunar infarct posterior limb right internal capsule.   Artifact versus questionable loss of gray-white matter differentiation in   the bilateral occipital lobes. MRI exam recommended to exclude acute   ischemia.   ER called at 2:00 PM and 2:02 PM on 6/21/2023. Dr. Perez   notified at 2:04 PM    Unremarkable CT perfusion    No hemodynamically significant stenosis    CTA Head and Neck and CT Perfusion 6/21/23  CTA of the neck:  The common carotid and internal carotid arteries are patent. Calcified   atherosclerotic plaque at the right carotid bulb noted  The extracranial vertebral arteries are patent.  Degenerative changes noted. Small thyroid nodules noted    CTA Head:  The proximal anterior, middle and posterior cerebral arteries are patent.  The intracranial vertebral and basilar arteries are patent.  There is no intracranial aneurysm.    CT perfusion:  No core infarct or evidence of delayed mean transit time is identified.     [facility] Stroke Team  Consult Note    CC: Right facial weakness  HPI:  77 year old male with history of Dementia, PAD s/p LE Stents x 2, HTN (diet controlled), HLD, Hypothyroidism and BPH brought by EMS on 6/21/23 with right facial weakness. History was taken from wife at bedside as patient is unable to provide any information due to his baseline dementia. Stroke consult was called at 6/23/23 at 1530. Patient cannot provide information, however as per wife, patient lives at a memory center where the nurse woke him up on 6/21/23 at an unknown time in the morning and she noticed a right eye weakness. Last known well unknown. Patient does currently have a right eye infection, therefore she didn't know if it was due to this. The wife came to visit him at 12:00 pm, where she noticed a right facial weakness and EMS was called. On presentation, patient does not speak much, but no arm or leg weakness was appreciated.     PAST MEDICAL & SURGICAL HISTORY:  Dementia  PAD (peripheral artery disease)  HTN (hypertension)  HLD (hyperlipidemia)  Status post arterial stent  History of left knee replacement      MEDICATIONS  (STANDING):  aspirin  chewable 81 milliGRAM(s) Oral daily  atorvastatin 80 milliGRAM(s) Oral at bedtime  cefTRIAXone Injectable. 1000 milliGRAM(s) IV Push every 24 hours  chlorhexidine 2% Cloths 1 Application(s) Topical <User Schedule>  ciprofloxacin  0.3% Ophthalmic Solution 1 Drop(s) Right EYE every 8 hours  dextrose 5% + sodium chloride 0.45%. 1000 milliLiter(s) (75 mL/Hr) IV Continuous <Continuous>  lactated ringers. 1000 milliLiter(s) (75 mL/Hr) IV Continuous <Continuous>  levothyroxine 75 MICROGram(s) Oral daily  tamsulosin 0.4 milliGRAM(s) Oral at bedtime    MEDICATIONS  (PRN):  acetaminophen  Suppository .. 650 milliGRAM(s) Rectal every 6 hours PRN Temp greater or equal to 38C (100.4F), Mild Pain (1 - 3), Moderate Pain (4 - 6)  haloperidol    Injectable 2.5 milliGRAM(s) IntraMuscular every 6 hours PRN Agitation  ondansetron Injectable 4 milliGRAM(s) IV Push every 6 hours PRN Nausea and/or Vomiting      Allergies  penicillin (Hives; Urticaria)    SOCIAL HISTORY:  no tob,   no alcohol   no drugs    FAMILY HISTORY:  No pertinent family history in first degree relatives    ROS: 14 point ROS negative other than what is present in HPI or below    Vital Signs Last 24 Hrs  T(C): 37.1 (23 Jun 2023 15:12), Max: 37.1 (23 Jun 2023 05:18)  T(F): 98.7 (23 Jun 2023 15:12), Max: 98.7 (23 Jun 2023 05:18)  HR: 63 (23 Jun 2023 15:12) (57 - 68)  BP: 138/77 (23 Jun 2023 15:12) (101/61 - 138/77)  BP(mean): 97 (23 Jun 2023 15:12) (97 - 97)  RR: 18 (23 Jun 2023 15:12) (18 - 19)  SpO2: 98% (23 Jun 2023 15:12) (95% - 98%)    Parameters below as of 23 Jun 2023 15:12  Patient On (Oxygen Delivery Method): nasal cannula  O2 Flow (L/min): 3    General: NAD    Detailed Neurologic Exam:    Mental status: Patient is awake with limited verbal output. Patient mumbles few words that do not make sense. Dysarthria noted.  Unable to orientate to self, place, or time. Unable to repeat, follow commands, or repeat. Patient does have dementia and as per wife this is his baseline of mental status.     Cranial nerves:  There is no ptosis. Facial sensation is intact. Facial musculature shows right sided central weakness.    Motor: There is normal bulk and tone.  There is no tremor. Moves all 4 extremities spontaneously antigravity but not with following commands.    Sensation: Intact to light touch in all 4 extremities    Cerebellar: Unable to access due to mental status    Gait : deferred    [facility] SS: 9  DATE: 6/23/23  TIME: 16:00  1A: Level of consciousness (0-3): 0  1B: Questions (0-2): 2  1C: Commands (0-2): 2  2: Gaze (0-2): 0  3: Visual fields (0-3): 0  4: Facial palsy (0-3): 1  MOTOR:  5A: Left arm motor drift (0-4): 0  5B: Right arm motor drift (0-4): 0  6A: Left leg motor drift (0-4): 0  6B: Right leg motor drift (0-4): 0  7: Limb ataxia (0-2): 0  SENSORY:  8: Sensation (0-2): 0  SPEECH:  9: Language (0-3): 3  10: Dysarthria (0-2): 1  EXTINCTION:  11: Extinction/inattention (0-2): 0    TOTAL SCORE: 9 (dementiais likely artificially elevating this score)    prehospital mRS= 5      LABS:                         13.4   10.43 )-----------( 161      ( 23 Jun 2023 02:54 )             40.6       06-23    147<H>  |  107  |  18.5  ----------------------------<  105<H>  4.3   |  28.0  |  0.89    Ca    9.0      23 Jun 2023 02:54  Mg     2.1     06-23    TPro  6.5<L>  /  Alb  3.8  /  TBili  0.5  /  DBili  x   /  AST  14  /  ALT  8   /  AlkPhos  79  06-23    Lipid Profile (06.22.23 @ 02:50)    Cholesterol: 105 mg/dL   Triglycerides, Serum: 104 mg/dL   HDL Cholesterol: 44 mg/dL   Non HDL Cholesterol: 61tients              LDL Cholesterol Calculated: 40 mg/dL    (06.22.23 @ 02:50)    A1C with Estimated Average Glucose Result: 6.0 %   Estimated Average Glucose: 126 mg/dL    RADIOLOGY & ADDITIONAL STUDIES (independently reviewed unless otherwise noted):    CT Brain Stroke Protocol (06.21.23 @ 13:51)   IMPRESSION:  No acute intracranial hemorrhage.    Age-indeterminate lacunar infarct posterior limb right internal capsule.   Artifact versus questionable loss of gray-white matter differentiation in   the bilateral occipital lobes. MRI exam recommended to exclude acute   ischemia.   ER called at 2:00 PM and 2:02 PM on 6/21/2023. Dr. Perez   notified at 2:04 PM    Unremarkable CT perfusion    No hemodynamically significant stenosis    CTA Head and Neck and CT Perfusion 6/21/23  CTA of the neck:  The common carotid and internal carotid arteries are patent. Calcified   atherosclerotic plaque at the right carotid bulb noted  The extracranial vertebral arteries are patent.  Degenerative changes noted. Small thyroid nodules noted    CTA Head:  The proximal anterior, middle and posterior cerebral arteries are patent.  The intracranial vertebral and basilar arteries are patent.  There is no intracranial aneurysm.    CT perfusion:  No core infarct or evidence of delayed mean transit time is identified.     [facility] Stroke Team  Consult Note    CC: Right facial weakness  HPI:  77 year old male with history of Dementia, PAD s/p LE Stents x 2, HTN (diet controlled), HLD, Hypothyroidism and BPH brought by EMS on 6/21/23 with right facial weakness. History was taken from wife at bedside as patient is unable to provide any information due to his baseline dementia. Stroke consult was called at 6/23/23 at 1530. Patient cannot provide information, however as per wife, patient lives at a memory center where the nurse woke him up on 6/21/23 at an unknown time in the morning and she noticed a right eye weakness. Last known well unknown. Patient does currently have a right eye infection, therefore she didn't know if it was due to this. The wife came to visit him at 12:00 pm, where she noticed a right facial weakness and EMS was called. On presentation, patient does not speak much, but no arm or leg weakness was appreciated.     PAST MEDICAL & SURGICAL HISTORY:  Dementia  PAD (peripheral artery disease)  HTN (hypertension)  HLD (hyperlipidemia)  Status post arterial stent  History of left knee replacement      MEDICATIONS  (STANDING):  aspirin  chewable 81 milliGRAM(s) Oral daily  atorvastatin 80 milliGRAM(s) Oral at bedtime  cefTRIAXone Injectable. 1000 milliGRAM(s) IV Push every 24 hours  chlorhexidine 2% Cloths 1 Application(s) Topical <User Schedule>  ciprofloxacin  0.3% Ophthalmic Solution 1 Drop(s) Right EYE every 8 hours  dextrose 5% + sodium chloride 0.45%. 1000 milliLiter(s) (75 mL/Hr) IV Continuous <Continuous>  lactated ringers. 1000 milliLiter(s) (75 mL/Hr) IV Continuous <Continuous>  levothyroxine 75 MICROGram(s) Oral daily  tamsulosin 0.4 milliGRAM(s) Oral at bedtime    MEDICATIONS  (PRN):  acetaminophen  Suppository .. 650 milliGRAM(s) Rectal every 6 hours PRN Temp greater or equal to 38C (100.4F), Mild Pain (1 - 3), Moderate Pain (4 - 6)  haloperidol    Injectable 2.5 milliGRAM(s) IntraMuscular every 6 hours PRN Agitation  ondansetron Injectable 4 milliGRAM(s) IV Push every 6 hours PRN Nausea and/or Vomiting      Allergies  penicillin (Hives; Urticaria)    SOCIAL HISTORY:  no tob,   no alcohol   no drugs    FAMILY HISTORY:  No pertinent family history in first degree relatives    ROS: 14 point ROS negative other than what is present in HPI or below    Vital Signs Last 24 Hrs  T(C): 37.1 (23 Jun 2023 15:12), Max: 37.1 (23 Jun 2023 05:18)  T(F): 98.7 (23 Jun 2023 15:12), Max: 98.7 (23 Jun 2023 05:18)  HR: 63 (23 Jun 2023 15:12) (57 - 68)  BP: 138/77 (23 Jun 2023 15:12) (101/61 - 138/77)  BP(mean): 97 (23 Jun 2023 15:12) (97 - 97)  RR: 18 (23 Jun 2023 15:12) (18 - 19)  SpO2: 98% (23 Jun 2023 15:12) (95% - 98%)    Parameters below as of 23 Jun 2023 15:12  Patient On (Oxygen Delivery Method): nasal cannula  O2 Flow (L/min): 3    General: NAD    Detailed Neurologic Exam:    Mental status: Patient is awake with limited verbal output. Patient mumbles few words that do not make sense. Dysarthria noted.  Unable to orientate to self, place, or time. Unable to repeat, follow commands, or repeat. Patient does have dementia and as per wife this is his baseline of mental status.     Cranial nerves:  There is no ptosis. Facial sensation is intact. Facial musculature shows right sided central weakness.    Motor: There is normal bulk and tone.  There is no tremor. Moves all 4 extremities spontaneously antigravity but not with following commands.    Sensation: Intact to light touch in all 4 extremities    Cerebellar: Unable to access due to mental status    Gait : deferred    [facility] SS: 9  DATE: 6/23/23  TIME: 16:00  1A: Level of consciousness (0-3): 0  1B: Questions (0-2): 2  1C: Commands (0-2): 2  2: Gaze (0-2): 0  3: Visual fields (0-3): 0  4: Facial palsy (0-3): 1  MOTOR:  5A: Left arm motor drift (0-4): 0  5B: Right arm motor drift (0-4): 0  6A: Left leg motor drift (0-4): 0  6B: Right leg motor drift (0-4): 0  7: Limb ataxia (0-2): 0  SENSORY:  8: Sensation (0-2): 0  SPEECH:  9: Language (0-3): 3  10: Dysarthria (0-2): 1  EXTINCTION:  11: Extinction/inattention (0-2): 0    TOTAL SCORE: 9 (dementiais likely artificially elevating this score)    prehospital mRS= 5      LABS:                         13.4   10.43 )-----------( 161      ( 23 Jun 2023 02:54 )             40.6       06-23    147<H>  |  107  |  18.5  ----------------------------<  105<H>  4.3   |  28.0  |  0.89    Ca    9.0      23 Jun 2023 02:54  Mg     2.1     06-23    TPro  6.5<L>  /  Alb  3.8  /  TBili  0.5  /  DBili  x   /  AST  14  /  ALT  8   /  AlkPhos  79  06-23    Lipid Profile (06.22.23 @ 02:50)    Cholesterol: 105 mg/dL   Triglycerides, Serum: 104 mg/dL   HDL Cholesterol: 44 mg/dL   Non HDL Cholesterol: 61tients              LDL Cholesterol Calculated: 40 mg/dL    (06.22.23 @ 02:50)    A1C with Estimated Average Glucose Result: 6.0 %   Estimated Average Glucose: 126 mg/dL    RADIOLOGY & ADDITIONAL STUDIES (independently reviewed unless otherwise noted):    CT Brain Stroke Protocol (06.21.23 @ 13:51)   IMPRESSION:  No acute intracranial hemorrhage.    Age-indeterminate lacunar infarct posterior limb right internal capsule.   Artifact versus questionable loss of gray-white matter differentiation in   the bilateral occipital lobes. MRI exam recommended to exclude acute   ischemia.   ER called at 2:00 PM and 2:02 PM on 6/21/2023. Dr. Perez   notified at 2:04 PM    Unremarkable CT perfusion    No hemodynamically significant stenosis    CTA Head and Neck and CT Perfusion 6/21/23  CTA of the neck:  The common carotid and internal carotid arteries are patent. Calcified   atherosclerotic plaque at the right carotid bulb noted  The extracranial vertebral arteries are patent.  Degenerative changes noted. Small thyroid nodules noted    CTA Head:  The proximal anterior, middle and posterior cerebral arteries are patent.  The intracranial vertebral and basilar arteries are patent.  There is no intracranial aneurysm.    CT perfusion:  No core infarct or evidence of delayed mean transit time is identified.     [facility] Stroke Team  Consult Note    CC: Right facial weakness  HPI:  77 year old male with history of Dementia, PAD s/p LE Stents x 2, HTN (diet controlled), HLD, Hypothyroidism and BPH brought by EMS on 6/21/23 with right facial weakness. History was taken from wife at bedside as patient is unable to provide any information due to his baseline dementia. Stroke consult was called at 6/23/23 at 1530. Patient cannot provide information, however as per wife, patient lives at a memory center where the nurse woke him up on 6/21/23 at an unknown time in the morning and she noticed a right eye weakness. Last known well unknown. Patient does currently have a right eye infection, therefore she didn't know if it was due to this. The wife came to visit him at 12:00 pm, where she noticed a right facial weakness and EMS was called. On presentation, patient does not speak much, but no arm or leg weakness was appreciated.     PAST MEDICAL & SURGICAL HISTORY:  Dementia  PAD (peripheral artery disease)  HTN (hypertension)  HLD (hyperlipidemia)  Status post arterial stent  History of left knee replacement      MEDICATIONS  (STANDING):  aspirin  chewable 81 milliGRAM(s) Oral daily  atorvastatin 80 milliGRAM(s) Oral at bedtime  cefTRIAXone Injectable. 1000 milliGRAM(s) IV Push every 24 hours  chlorhexidine 2% Cloths 1 Application(s) Topical <User Schedule>  ciprofloxacin  0.3% Ophthalmic Solution 1 Drop(s) Right EYE every 8 hours  dextrose 5% + sodium chloride 0.45%. 1000 milliLiter(s) (75 mL/Hr) IV Continuous <Continuous>  lactated ringers. 1000 milliLiter(s) (75 mL/Hr) IV Continuous <Continuous>  levothyroxine 75 MICROGram(s) Oral daily  tamsulosin 0.4 milliGRAM(s) Oral at bedtime    MEDICATIONS  (PRN):  acetaminophen  Suppository .. 650 milliGRAM(s) Rectal every 6 hours PRN Temp greater or equal to 38C (100.4F), Mild Pain (1 - 3), Moderate Pain (4 - 6)  haloperidol    Injectable 2.5 milliGRAM(s) IntraMuscular every 6 hours PRN Agitation  ondansetron Injectable 4 milliGRAM(s) IV Push every 6 hours PRN Nausea and/or Vomiting      Allergies  penicillin (Hives; Urticaria)    SOCIAL HISTORY:  no tob,   no alcohol   no drugs    FAMILY HISTORY:  No pertinent family history in first degree relatives    ROS: 14 point ROS negative other than what is present in HPI or below    Vital Signs Last 24 Hrs  T(C): 37.1 (23 Jun 2023 15:12), Max: 37.1 (23 Jun 2023 05:18)  T(F): 98.7 (23 Jun 2023 15:12), Max: 98.7 (23 Jun 2023 05:18)  HR: 63 (23 Jun 2023 15:12) (57 - 68)  BP: 138/77 (23 Jun 2023 15:12) (101/61 - 138/77)  BP(mean): 97 (23 Jun 2023 15:12) (97 - 97)  RR: 18 (23 Jun 2023 15:12) (18 - 19)  SpO2: 98% (23 Jun 2023 15:12) (95% - 98%)    Parameters below as of 23 Jun 2023 15:12  Patient On (Oxygen Delivery Method): nasal cannula  O2 Flow (L/min): 3    General: NAD    Detailed Neurologic Exam:    Mental status: Patient is awake with limited verbal output. Patient mumbles few words that do not make sense. Dysarthria noted.  Unable to orientate to self, place, or time. Unable to repeat, follow commands, or repeat. Patient does have dementia and as per wife this is his baseline of mental status.     Cranial nerves:  There is no ptosis. Facial sensation is intact. Facial musculature shows right sided central weakness.    Motor: There is normal bulk and tone.  There is no tremor. Moves all 4 extremities spontaneously antigravity but not with following commands.    Sensation: Intact to light touch in all 4 extremities    Cerebellar: Unable to access due to mental status    Gait : deferred    [facility] SS: 9  DATE: 6/23/23  TIME: 16:00  1A: Level of consciousness (0-3): 0  1B: Questions (0-2): 2  1C: Commands (0-2): 2  2: Gaze (0-2): 0  3: Visual fields (0-3): 0  4: Facial palsy (0-3): 1  MOTOR:  5A: Left arm motor drift (0-4): 0  5B: Right arm motor drift (0-4): 0  6A: Left leg motor drift (0-4): 0  6B: Right leg motor drift (0-4): 0  7: Limb ataxia (0-2): 0  SENSORY:  8: Sensation (0-2): 0  SPEECH:  9: Language (0-3): 3  10: Dysarthria (0-2): 1  EXTINCTION:  11: Extinction/inattention (0-2): 0    TOTAL SCORE: 9 (dementiais likely artificially elevating this score)    prehospital mRS= 5      LABS:                         13.4   10.43 )-----------( 161      ( 23 Jun 2023 02:54 )             40.6       06-23    147<H>  |  107  |  18.5  ----------------------------<  105<H>  4.3   |  28.0  |  0.89    Ca    9.0      23 Jun 2023 02:54  Mg     2.1     06-23    TPro  6.5<L>  /  Alb  3.8  /  TBili  0.5  /  DBili  x   /  AST  14  /  ALT  8   /  AlkPhos  79  06-23    Lipid Profile (06.22.23 @ 02:50)    Cholesterol: 105 mg/dL   Triglycerides, Serum: 104 mg/dL   HDL Cholesterol: 44 mg/dL   Non HDL Cholesterol: 61tients              LDL Cholesterol Calculated: 40 mg/dL    (06.22.23 @ 02:50)    A1C with Estimated Average Glucose Result: 6.0 %   Estimated Average Glucose: 126 mg/dL    RADIOLOGY & ADDITIONAL STUDIES (independently reviewed unless otherwise noted):    CT Brain Stroke Protocol (06.21.23 @ 13:51)   IMPRESSION:  No acute intracranial hemorrhage.    Age-indeterminate lacunar infarct posterior limb right internal capsule.   Artifact versus questionable loss of gray-white matter differentiation in   the bilateral occipital lobes. MRI exam recommended to exclude acute   ischemia.   ER called at 2:00 PM and 2:02 PM on 6/21/2023. Dr. Perez   notified at 2:04 PM    Unremarkable CT perfusion    No hemodynamically significant stenosis    CTA Head and Neck and CT Perfusion 6/21/23  CTA of the neck:  The common carotid and internal carotid arteries are patent. Calcified   atherosclerotic plaque at the right carotid bulb noted  The extracranial vertebral arteries are patent.  Degenerative changes noted. Small thyroid nodules noted    CTA Head:  The proximal anterior, middle and posterior cerebral arteries are patent.  The intracranial vertebral and basilar arteries are patent.  There is no intracranial aneurysm.    CT perfusion:  No core infarct or evidence of delayed mean transit time is identified.

## 2023-06-24 LAB
ALBUMIN SERPL ELPH-MCNC: 3.5 G/DL — SIGNIFICANT CHANGE UP (ref 3.3–5.2)
ALP SERPL-CCNC: 73 U/L — SIGNIFICANT CHANGE UP (ref 40–120)
ALT FLD-CCNC: 8 U/L — SIGNIFICANT CHANGE UP
ANION GAP SERPL CALC-SCNC: 10 MMOL/L — SIGNIFICANT CHANGE UP (ref 5–17)
AST SERPL-CCNC: 15 U/L — SIGNIFICANT CHANGE UP
BILIRUB SERPL-MCNC: 0.5 MG/DL — SIGNIFICANT CHANGE UP (ref 0.4–2)
BUN SERPL-MCNC: 17.4 MG/DL — SIGNIFICANT CHANGE UP (ref 8–20)
CALCIUM SERPL-MCNC: 8.8 MG/DL — SIGNIFICANT CHANGE UP (ref 8.4–10.5)
CHLORIDE SERPL-SCNC: 103 MMOL/L — SIGNIFICANT CHANGE UP (ref 96–108)
CO2 SERPL-SCNC: 27 MMOL/L — SIGNIFICANT CHANGE UP (ref 22–29)
CREAT SERPL-MCNC: 0.67 MG/DL — SIGNIFICANT CHANGE UP (ref 0.5–1.3)
EGFR: 97 ML/MIN/1.73M2 — SIGNIFICANT CHANGE UP
GLUCOSE SERPL-MCNC: 123 MG/DL — HIGH (ref 70–99)
HCT VFR BLD CALC: 40.9 % — SIGNIFICANT CHANGE UP (ref 39–50)
HGB BLD-MCNC: 13.4 G/DL — SIGNIFICANT CHANGE UP (ref 13–17)
MAGNESIUM SERPL-MCNC: 2 MG/DL — SIGNIFICANT CHANGE UP (ref 1.6–2.6)
MCHC RBC-ENTMCNC: 29.3 PG — SIGNIFICANT CHANGE UP (ref 27–34)
MCHC RBC-ENTMCNC: 32.8 GM/DL — SIGNIFICANT CHANGE UP (ref 32–36)
MCV RBC AUTO: 89.3 FL — SIGNIFICANT CHANGE UP (ref 80–100)
PLATELET # BLD AUTO: 153 K/UL — SIGNIFICANT CHANGE UP (ref 150–400)
POTASSIUM SERPL-MCNC: 3.9 MMOL/L — SIGNIFICANT CHANGE UP (ref 3.5–5.3)
POTASSIUM SERPL-SCNC: 3.9 MMOL/L — SIGNIFICANT CHANGE UP (ref 3.5–5.3)
PROT SERPL-MCNC: 6.4 G/DL — LOW (ref 6.6–8.7)
RBC # BLD: 4.58 M/UL — SIGNIFICANT CHANGE UP (ref 4.2–5.8)
RBC # FLD: 13.4 % — SIGNIFICANT CHANGE UP (ref 10.3–14.5)
SODIUM SERPL-SCNC: 140 MMOL/L — SIGNIFICANT CHANGE UP (ref 135–145)
WBC # BLD: 7.3 K/UL — SIGNIFICANT CHANGE UP (ref 3.8–10.5)
WBC # FLD AUTO: 7.3 K/UL — SIGNIFICANT CHANGE UP (ref 3.8–10.5)

## 2023-06-24 PROCEDURE — 99233 SBSQ HOSP IP/OBS HIGH 50: CPT

## 2023-06-24 RX ORDER — ENOXAPARIN SODIUM 100 MG/ML
40 INJECTION SUBCUTANEOUS EVERY 24 HOURS
Refills: 0 | Status: DISCONTINUED | OUTPATIENT
Start: 2023-06-24 | End: 2023-06-24

## 2023-06-24 RX ORDER — CLOPIDOGREL BISULFATE 75 MG/1
75 TABLET, FILM COATED ORAL DAILY
Refills: 0 | Status: DISCONTINUED | OUTPATIENT
Start: 2023-06-24 | End: 2023-06-26

## 2023-06-24 RX ORDER — ATORVASTATIN CALCIUM 80 MG/1
80 TABLET, FILM COATED ORAL AT BEDTIME
Refills: 0 | Status: DISCONTINUED | OUTPATIENT
Start: 2023-06-24 | End: 2023-06-26

## 2023-06-24 RX ADMIN — Medication 24 MILLIGRAM(S): at 14:17

## 2023-06-24 RX ADMIN — Medication 1 DROP(S): at 21:49

## 2023-06-24 RX ADMIN — SODIUM CHLORIDE 75 MILLILITER(S): 9 INJECTION, SOLUTION INTRAVENOUS at 04:29

## 2023-06-24 RX ADMIN — TAMSULOSIN HYDROCHLORIDE 0.4 MILLIGRAM(S): 0.4 CAPSULE ORAL at 21:50

## 2023-06-24 RX ADMIN — CHLORHEXIDINE GLUCONATE 1 APPLICATION(S): 213 SOLUTION TOPICAL at 05:31

## 2023-06-24 RX ADMIN — ATORVASTATIN CALCIUM 80 MILLIGRAM(S): 80 TABLET, FILM COATED ORAL at 21:50

## 2023-06-24 RX ADMIN — Medication 1 DROP(S): at 05:31

## 2023-06-24 RX ADMIN — CEFTRIAXONE 1000 MILLIGRAM(S): 500 INJECTION, POWDER, FOR SOLUTION INTRAMUSCULAR; INTRAVENOUS at 05:30

## 2023-06-24 RX ADMIN — Medication 75 MICROGRAM(S): at 05:31

## 2023-06-24 RX ADMIN — Medication 81 MILLIGRAM(S): at 14:16

## 2023-06-24 RX ADMIN — Medication 1 DROP(S): at 14:21

## 2023-06-24 NOTE — PROGRESS NOTE ADULT - SUBJECTIVE AND OBJECTIVE BOX
CHIEF COMPLAINT/INTERVAL HISTORY:    Patient is a 77y old  Male who presents with a chief complaint of Right Facial Droop/ stroke workup (23 Jun 2023 17:06)    SUBJECTIVE & OBJECTIVE: Pt seen and examined at bedside. No overnight events. More forgetful than baseline per family at baseline. Right facial paralysis likely due to [place] Palsy.    ROS: Unobtainable    ICU Vital Signs Last 24 Hrs  T(C): 36.4 (24 Jun 2023 10:20), Max: 36.7 (23 Jun 2023 21:57)  T(F): 97.5 (24 Jun 2023 10:20), Max: 98 (23 Jun 2023 21:57)  HR: 63 (24 Jun 2023 10:20) (53 - 81)  BP: 126/73 (24 Jun 2023 10:20) (102/63 - 148/78)  RR: 18 (24 Jun 2023 05:42) (18 - 18)  SpO2: 98% (24 Jun 2023 10:20) (98% - 99%)    O2 Parameters below as of 24 Jun 2023 10:20  Patient On (Oxygen Delivery Method): nasal cannula    MEDICATIONS  (STANDING):  aspirin  chewable 81 milliGRAM(s) Oral daily  atorvastatin 80 milliGRAM(s) Oral at bedtime  cefTRIAXone Injectable. 1000 milliGRAM(s) IV Push every 24 hours  chlorhexidine 2% Cloths 1 Application(s) Topical <User Schedule>  ciprofloxacin  0.3% Ophthalmic Solution 1 Drop(s) Right EYE every 8 hours  dextrose 5% + sodium chloride 0.45%. 1000 milliLiter(s) (75 mL/Hr) IV Continuous <Continuous>  lactated ringers. 1000 milliLiter(s) (75 mL/Hr) IV Continuous <Continuous>  levothyroxine 75 MICROGram(s) Oral daily  methylPREDNISolone   Oral   tamsulosin 0.4 milliGRAM(s) Oral at bedtime    MEDICATIONS  (PRN):  acetaminophen  Suppository .. 650 milliGRAM(s) Rectal every 6 hours PRN Temp greater or equal to 38C (100.4F), Mild Pain (1 - 3), Moderate Pain (4 - 6)  haloperidol    Injectable 2.5 milliGRAM(s) IntraMuscular every 6 hours PRN Agitation  ondansetron Injectable 4 milliGRAM(s) IV Push every 6 hours PRN Nausea and/or Vomiting      LABS:                        13.4   7.30  )-----------( 153      ( 24 Jun 2023 07:06 )             40.9     06-24    140  |  103  |  17.4  ----------------------------<  123<H>  3.9   |  27.0  |  0.67    Ca    8.8      24 Jun 2023 07:06  Mg     2.0     06-24    TPro  6.4<L>  /  Alb  3.5  /  TBili  0.5  /  DBili  x   /  AST  15  /  ALT  8   /  AlkPhos  73  06-24      Urinalysis Basic - ( 24 Jun 2023 07:06 )    Color: x / Appearance: x / SG: x / pH: x  Gluc: 123 mg/dL / Ketone: x  / Bili: x / Urobili: x   Blood: x / Protein: x / Nitrite: x   Leuk Esterase: x / RBC: x / WBC x   Sq Epi: x / Non Sq Epi: x / Bacteria: x    PHYSICAL EXAM:    GENERAL: elderly male, laying in bed, NAD  HEAD:  Atraumatic, Normocephalic  EYES: conjunctiva and sclera clear  ENMT: Moist mucous membranes  NECK: Supple   NERVOUS SYSTEM:  Awake, follows simple commands, hard of hearing, right facial droop and palsy  CHEST/LUNG: Clear to auscultation bilaterally   HEART: Regular rate and rhythm; + S1/S2  ABDOMEN: Soft, Nontender, obese  EXTREMITIES:  no pedal edema     CHIEF COMPLAINT/INTERVAL HISTORY:    Patient is a 77y old  Male who presents with a chief complaint of Right Facial Droop/ stroke workup (23 Jun 2023 17:06)    SUBJECTIVE & OBJECTIVE: Pt seen and examined at bedside. No overnight events. More forgetful than baseline per family at baseline. Right facial paralysis likely due to [place] Palsy.    ROS: Unobtainable    ICU Vital Signs Last 24 Hrs  T(C): 36.4 (24 Jun 2023 10:20), Max: 36.7 (23 Jun 2023 21:57)  T(F): 97.5 (24 Jun 2023 10:20), Max: 98 (23 Jun 2023 21:57)  HR: 63 (24 Jun 2023 10:20) (53 - 81)  BP: 126/73 (24 Jun 2023 10:20) (102/63 - 148/78)  RR: 18 (24 Jun 2023 05:42) (18 - 18)  SpO2: 98% (24 Jun 2023 10:20) (98% - 99%)    O2 Parameters below as of 24 Jun 2023 10:20  Patient On (Oxygen Delivery Method): nasal cannula    MEDICATIONS  (STANDING):  aspirin  chewable 81 milliGRAM(s) Oral daily  atorvastatin 80 milliGRAM(s) Oral at bedtime  cefTRIAXone Injectable. 1000 milliGRAM(s) IV Push every 24 hours  chlorhexidine 2% Cloths 1 Application(s) Topical <User Schedule>  ciprofloxacin  0.3% Ophthalmic Solution 1 Drop(s) Right EYE every 8 hours  dextrose 5% + sodium chloride 0.45%. 1000 milliLiter(s) (75 mL/Hr) IV Continuous <Continuous>  lactated ringers. 1000 milliLiter(s) (75 mL/Hr) IV Continuous <Continuous>  levothyroxine 75 MICROGram(s) Oral daily  methylPREDNISolone   Oral   tamsulosin 0.4 milliGRAM(s) Oral at bedtime    MEDICATIONS  (PRN):  acetaminophen  Suppository .. 650 milliGRAM(s) Rectal every 6 hours PRN Temp greater or equal to 38C (100.4F), Mild Pain (1 - 3), Moderate Pain (4 - 6)  haloperidol    Injectable 2.5 milliGRAM(s) IntraMuscular every 6 hours PRN Agitation  ondansetron Injectable 4 milliGRAM(s) IV Push every 6 hours PRN Nausea and/or Vomiting      LABS:                        13.4   7.30  )-----------( 153      ( 24 Jun 2023 07:06 )             40.9     06-24    140  |  103  |  17.4  ----------------------------<  123<H>  3.9   |  27.0  |  0.67    Ca    8.8      24 Jun 2023 07:06  Mg     2.0     06-24    TPro  6.4<L>  /  Alb  3.5  /  TBili  0.5  /  DBili  x   /  AST  15  /  ALT  8   /  AlkPhos  73  06-24      Urinalysis Basic - ( 24 Jun 2023 07:06 )    Color: x / Appearance: x / SG: x / pH: x  Gluc: 123 mg/dL / Ketone: x  / Bili: x / Urobili: x   Blood: x / Protein: x / Nitrite: x   Leuk Esterase: x / RBC: x / WBC x   Sq Epi: x / Non Sq Epi: x / Bacteria: x    PHYSICAL EXAM:    GENERAL: elderly male, laying in bed, NAD  HEAD:  Atraumatic, Normocephalic  EYES: conjunctiva and sclera clear  ENMT: Moist mucous membranes  NECK: Supple   NERVOUS SYSTEM:  Awake, follows simple commands, hard of hearing, right facial droop and palsy  CHEST/LUNG: Clear to auscultation bilaterally   HEART: Regular rate and rhythm; + S1/S2  ABDOMEN: Soft, Nontender, obese  EXTREMITIES:  no pedal edema     CHIEF COMPLAINT/INTERVAL HISTORY:    Patient is a 77y old  Male who presents with a chief complaint of Right Facial Droop/ stroke workup (23 Jun 2023 17:06)    SUBJECTIVE & OBJECTIVE: Pt seen and examined at bedside. No overnight events. More forgetful than baseline per family at baseline. Right facial paralysis likely due to [place] Palsy.    ROS: Unobtainable    ICU Vital Signs Last 24 Hrs  T(C): 36.4 (24 Jun 2023 10:20), Max: 36.7 (23 Jun 2023 21:57)  T(F): 97.5 (24 Jun 2023 10:20), Max: 98 (23 Jun 2023 21:57)  HR: 63 (24 Jun 2023 10:20) (53 - 81)  BP: 126/73 (24 Jun 2023 10:20) (102/63 - 148/78)  RR: 18 (24 Jun 2023 05:42) (18 - 18)  SpO2: 98% (24 Jun 2023 10:20) (98% - 99%)    O2 Parameters below as of 24 Jun 2023 10:20  Patient On (Oxygen Delivery Method): nasal cannula    MEDICATIONS  (STANDING):  aspirin  chewable 81 milliGRAM(s) Oral daily  atorvastatin 80 milliGRAM(s) Oral at bedtime  cefTRIAXone Injectable. 1000 milliGRAM(s) IV Push every 24 hours  chlorhexidine 2% Cloths 1 Application(s) Topical <User Schedule>  ciprofloxacin  0.3% Ophthalmic Solution 1 Drop(s) Right EYE every 8 hours  dextrose 5% + sodium chloride 0.45%. 1000 milliLiter(s) (75 mL/Hr) IV Continuous <Continuous>  lactated ringers. 1000 milliLiter(s) (75 mL/Hr) IV Continuous <Continuous>  levothyroxine 75 MICROGram(s) Oral daily  methylPREDNISolone   Oral   tamsulosin 0.4 milliGRAM(s) Oral at bedtime    MEDICATIONS  (PRN):  acetaminophen  Suppository .. 650 milliGRAM(s) Rectal every 6 hours PRN Temp greater or equal to 38C (100.4F), Mild Pain (1 - 3), Moderate Pain (4 - 6)  haloperidol    Injectable 2.5 milliGRAM(s) IntraMuscular every 6 hours PRN Agitation  ondansetron Injectable 4 milliGRAM(s) IV Push every 6 hours PRN Nausea and/or Vomiting      LABS:                        13.4   7.30  )-----------( 153      ( 24 Jun 2023 07:06 )             40.9     06-24    140  |  103  |  17.4  ----------------------------<  123<H>  3.9   |  27.0  |  0.67    Ca    8.8      24 Jun 2023 07:06  Mg     2.0     06-24    TPro  6.4<L>  /  Alb  3.5  /  TBili  0.5  /  DBili  x   /  AST  15  /  ALT  8   /  AlkPhos  73  06-24      Urinalysis Basic - ( 24 Jun 2023 07:06 )    Color: x / Appearance: x / SG: x / pH: x  Gluc: 123 mg/dL / Ketone: x  / Bili: x / Urobili: x   Blood: x / Protein: x / Nitrite: x   Leuk Esterase: x / RBC: x / WBC x   Sq Epi: x / Non Sq Epi: x / Bacteria: x    PHYSICAL EXAM:    GENERAL: elderly male, laying in bed, NAD  HEAD:  Atraumatic, Normocephalic  EYES: conjunctiva and sclera clear  ENMT: Moist mucous membranes  NECK: Supple   NERVOUS SYSTEM:  Awake, follows simple commands, hard of hearing, right facial droop and palsy  CHEST/LUNG: Clear to auscultation bilaterally   HEART: Regular rate and rhythm; + S1/S2  ABDOMEN: Soft, Nontender, obese  EXTREMITIES:  no pedal edema

## 2023-06-24 NOTE — PROGRESS NOTE ADULT - ASSESSMENT
Patient is a 77 year old male with history of Dementia, PAD s/p LE Stents x 2, HTN (diet controlled), HLD, Hypothyroidism and BPH brought by EMS with right facial droop. History was taken from wife at bedside as patient is unable to provide any information due to his baseline dementia. Per wife, patient was doing well until the morning of admission when she went to visit him and noticed his right eye was drooping. A Few minutes later she noticed right facial droop as well so she called the NH staff who confirmed that these are new changes so EMS was called. He does not speak much but there was no change from baseline. No arm/leg weakness. He had breakfast this morning with no difficulty swallowing.  In ER, he was Code Stroke. NIH 1. Not a candidate for Tenecteplase. CT Scans with no acute findings. MRI also negative. 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 Cho     > Metabolic Encephalopathy superimposed on baseline Dementia due to E. Coli UTI  -f/u urine culture sensitivities  -continue Ceftriaxone   - continue Memantine   - supportive care  - resides in a memory care unit     >Hypernatremia likely due to volume depletion  - sodium normalized  - d/c parenteral free water  - encourage PO intake  - monitor labs    >PAD   - resume Plavix   - continue ASA and statin    >Hypothyroidism  - check TSH  - continue synthroid    > BPH  - continue flomax     >HLD  -interchange rosuvastatin to lipitor     DVT Prophylaxis - Lovenox    Dispo- Remains acute, start medrol dose pack. F/u urine culture sensitivities.     Plan discussed with patient, daughter, niece, RN

## 2023-06-25 LAB
-  AMIKACIN: SIGNIFICANT CHANGE UP
-  AMOXICILLIN/CLAVULANIC ACID: SIGNIFICANT CHANGE UP
-  AMPICILLIN/SULBACTAM: SIGNIFICANT CHANGE UP
-  AMPICILLIN: SIGNIFICANT CHANGE UP
-  AZTREONAM: SIGNIFICANT CHANGE UP
-  CEFAZOLIN: SIGNIFICANT CHANGE UP
-  CEFEPIME: SIGNIFICANT CHANGE UP
-  CEFOXITIN: SIGNIFICANT CHANGE UP
-  CEFTRIAXONE: SIGNIFICANT CHANGE UP
-  CEFUROXIME: SIGNIFICANT CHANGE UP
-  CIPROFLOXACIN: SIGNIFICANT CHANGE UP
-  ERTAPENEM: SIGNIFICANT CHANGE UP
-  GENTAMICIN: SIGNIFICANT CHANGE UP
-  IMIPENEM: SIGNIFICANT CHANGE UP
-  LEVOFLOXACIN: SIGNIFICANT CHANGE UP
-  MEROPENEM: SIGNIFICANT CHANGE UP
-  NITROFURANTOIN: SIGNIFICANT CHANGE UP
-  PIPERACILLIN/TAZOBACTAM: SIGNIFICANT CHANGE UP
-  TOBRAMYCIN: SIGNIFICANT CHANGE UP
-  TRIMETHOPRIM/SULFAMETHOXAZOLE: SIGNIFICANT CHANGE UP
CULTURE RESULTS: SIGNIFICANT CHANGE UP
METHOD TYPE: SIGNIFICANT CHANGE UP
ORGANISM # SPEC MICROSCOPIC CNT: SIGNIFICANT CHANGE UP
SPECIMEN SOURCE: SIGNIFICANT CHANGE UP
TSH SERPL-MCNC: 2.81 UIU/ML — SIGNIFICANT CHANGE UP (ref 0.27–4.2)

## 2023-06-25 PROCEDURE — 99232 SBSQ HOSP IP/OBS MODERATE 35: CPT

## 2023-06-25 RX ADMIN — Medication 4 MILLIGRAM(S): at 05:21

## 2023-06-25 RX ADMIN — CLOPIDOGREL BISULFATE 75 MILLIGRAM(S): 75 TABLET, FILM COATED ORAL at 12:13

## 2023-06-25 RX ADMIN — CEFTRIAXONE 1000 MILLIGRAM(S): 500 INJECTION, POWDER, FOR SOLUTION INTRAMUSCULAR; INTRAVENOUS at 05:20

## 2023-06-25 RX ADMIN — CHLORHEXIDINE GLUCONATE 1 APPLICATION(S): 213 SOLUTION TOPICAL at 05:21

## 2023-06-25 RX ADMIN — Medication 1 DROP(S): at 12:14

## 2023-06-25 RX ADMIN — Medication 4 MILLIGRAM(S): at 12:13

## 2023-06-25 RX ADMIN — Medication 8 MILLIGRAM(S): at 21:19

## 2023-06-25 RX ADMIN — Medication 4 MILLIGRAM(S): at 18:00

## 2023-06-25 RX ADMIN — ATORVASTATIN CALCIUM 80 MILLIGRAM(S): 80 TABLET, FILM COATED ORAL at 21:19

## 2023-06-25 RX ADMIN — Medication 75 MICROGRAM(S): at 05:21

## 2023-06-25 RX ADMIN — Medication 1 DROP(S): at 21:19

## 2023-06-25 RX ADMIN — TAMSULOSIN HYDROCHLORIDE 0.4 MILLIGRAM(S): 0.4 CAPSULE ORAL at 21:19

## 2023-06-25 RX ADMIN — Medication 81 MILLIGRAM(S): at 12:13

## 2023-06-25 RX ADMIN — Medication 1 DROP(S): at 05:22

## 2023-06-25 NOTE — PROGRESS NOTE ADULT - SUBJECTIVE AND OBJECTIVE BOX
CHIEF COMPLAINT/INTERVAL HISTORY:    Patient is a 77y old  Male who presents with a chief complaint of Right Facial Droop (24 Jun 2023 15:59)    SUBJECTIVE & OBJECTIVE: Pt seen and examined at bedside. No overnight events. No acute changes; confused but at baseline per family. Right facial paralysis; on steroids.     ROS: Unobtainable    ICU Vital Signs Last 24 Hrs  T(C): 36.4 (25 Jun 2023 10:02), Max: 36.9 (25 Jun 2023 00:04)  T(F): 97.5 (25 Jun 2023 10:02), Max: 98.4 (25 Jun 2023 00:04)  HR: 70 (25 Jun 2023 10:02) (59 - 70)  BP: 128/72 (25 Jun 2023 10:02) (106/65 - 128/72)  RR: 18 (25 Jun 2023 04:52) (18 - 18)  SpO2: 98% (25 Jun 2023 10:02) (95% - 98%)    O2 Parameters below as of 25 Jun 2023 10:02  Patient On (Oxygen Delivery Method): nasal cannula  O2 Flow (L/min): 3    MEDICATIONS  (STANDING):  aspirin  chewable 81 milliGRAM(s) Oral daily  atorvastatin 80 milliGRAM(s) Oral at bedtime  cefTRIAXone Injectable. 1000 milliGRAM(s) IV Push every 24 hours  chlorhexidine 2% Cloths 1 Application(s) Topical <User Schedule>  ciprofloxacin  0.3% Ophthalmic Solution 1 Drop(s) Right EYE every 8 hours  clopidogrel Tablet 75 milliGRAM(s) Oral daily  levothyroxine 75 MICROGram(s) Oral daily  methylPREDNISolone 4 milliGRAM(s) Oral after lunch  methylPREDNISolone 4 milliGRAM(s) Oral before breakfast  methylPREDNISolone 4 milliGRAM(s) Oral after dinner  methylPREDNISolone 8 milliGRAM(s) Oral at bedtime  methylPREDNISolone   Oral   tamsulosin 0.4 milliGRAM(s) Oral at bedtime    MEDICATIONS  (PRN):  acetaminophen  Suppository .. 650 milliGRAM(s) Rectal every 6 hours PRN Temp greater or equal to 38C (100.4F), Mild Pain (1 - 3), Moderate Pain (4 - 6)  ondansetron Injectable 4 milliGRAM(s) IV Push every 6 hours PRN Nausea and/or Vomiting      LABS:                        13.4   7.30  )-----------( 153      ( 24 Jun 2023 07:06 )             40.9     06-24    140  |  103  |  17.4  ----------------------------<  123<H>  3.9   |  27.0  |  0.67    Ca    8.8      24 Jun 2023 07:06  Mg     2.0     06-24    TPro  6.4<L>  /  Alb  3.5  /  TBili  0.5  /  DBili  x   /  AST  15  /  ALT  8   /  AlkPhos  73  06-24      Urinalysis Basic - ( 24 Jun 2023 07:06 )    Color: x / Appearance: x / SG: x / pH: x  Gluc: 123 mg/dL / Ketone: x  / Bili: x / Urobili: x   Blood: x / Protein: x / Nitrite: x   Leuk Esterase: x / RBC: x / WBC x   Sq Epi: x / Non Sq Epi: x / Bacteria: x      PHYSICAL EXAM:    GENERAL: elderly male, laying in bed, NAD  HEAD:  Atraumatic, Normocephalic  EYES: conjunctiva and sclera clear  ENMT: Moist mucous membranes  NECK: Supple   NERVOUS SYSTEM:  Awake, follows simple commands, hard of hearing, right facial droop and palsy  CHEST/LUNG: Clear to auscultation bilaterally   HEART: Regular rate and rhythm; + S1/S2  ABDOMEN: Soft, Nontender, obese  EXTREMITIES:  no pedal edema

## 2023-06-25 NOTE — PROGRESS NOTE ADULT - ASSESSMENT
Patient is a 77 year old male with history of Dementia, PAD s/p LE Stents x 2, HTN (diet controlled), HLD, Hypothyroidism and BPH brought by EMS with right facial droop. History was taken from wife at bedside as patient is unable to provide any information due to his baseline dementia. Per wife, patient was doing well until the morning of admission when she went to visit him and noticed his right eye was drooping. A Few minutes later she noticed right facial droop as well so she called the NH staff who confirmed that these are new changes so EMS was called. He does not speak much but there was no change from baseline. No arm/leg weakness. He had breakfast this morning with no difficulty swallowing.  In ER, he was Code Stroke. NIH 1. Not a candidate for Tenecteplase. CT Scans with no acute findings. MRI also negative. 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 Cho     > Metabolic Encephalopathy superimposed on baseline Dementia due to E. Coli UTI  - urine culture sensitivities reviewed  - continue Ceftriaxone and switch to Vantin upon discharge  - continue Memantine   - supportive care  - resides in a memory care unit     >Hypernatremia likely due to volume depletion  - sodium normalized  - encourage PO intake  - monitor labs    >PAD   - continue Plavix, ASA and statin    >Hypothyroidism  - TSH WNL  - continue synthroid    > BPH  - continue flomax     >HLD  -interchange rosuvastatin to lipitor     DVT Prophylaxis - Lovenox    Dispo- Medically stable for discharge to [facility]; will be discharge on PO steroids and Vantin.      Plan discussed with patient's wife, daughter, SHIVA, RN     Patient is a 77 year old male with history of Dementia, PAD s/p LE Stents x 2, HTN (diet controlled), HLD, Hypothyroidism and BPH brought by EMS with right facial droop. History was taken from wife at bedside as patient is unable to provide any information due to his baseline dementia. Per wife, patient was doing well until the morning of admission when she went to visit him and noticed his right eye was drooping. A Few minutes later she noticed right facial droop as well so she called the NH staff who confirmed that these are new changes so EMS was called. He does not speak much but there was no change from baseline. No arm/leg weakness. He had breakfast this morning with no difficulty swallowing.  In ER, he was Code Stroke. NIH 1. Not a candidate for Tenecteplase. CT Scans with no acute findings. MRI also negative. 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 Cho     > Metabolic Encephalopathy superimposed on baseline Dementia due to E. Coli UTI  - urine culture sensitivities reviewed  - continue Ceftriaxone and switch to Vantin upon discharge  - continue Memantine   - supportive care  - resides in a memory care unit     >Hypernatremia likely due to volume depletion  - sodium normalized  - encourage PO intake  - monitor labs    >PAD   - continue Plavix, ASA and statin    >Hypothyroidism  - TSH WNL  - continue synthroid    > BPH  - continue flomax     >HLD  -interchange rosuvastatin to lipitor     DVT Prophylaxis - Lovenox    Dispo- Medically stable for discharge to [facility]; will be discharge on PO steroids and Vantin.      Plan discussed with patient's wife, daughter, SHIVA, RN     Patient is a 77 year old male with history of Dementia, PAD s/p LE Stents x 2, HTN (diet controlled), HLD, Hypothyroidism and BPH brought by EMS with right facial droop. History was taken from wife at bedside as patient is unable to provide any information due to his baseline dementia. Per wife, patient was doing well until the morning of admission when she went to visit him and noticed his right eye was drooping. A Few minutes later she noticed right facial droop as well so she called the NH staff who confirmed that these are new changes so EMS was called. He does not speak much but there was no change from baseline. No arm/leg weakness. He had breakfast this morning with no difficulty swallowing.  In ER, he was Code Stroke. NIH 1. Not a candidate for Tenecteplase. CT Scans with no acute findings. MRI also negative. 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 Cho     > Metabolic Encephalopathy superimposed on baseline Dementia due to E. Coli UTI  - urine culture sensitivities reviewed  - continue Ceftriaxone and switch to Vantin upon discharge  - continue Memantine   - supportive care  - resides in a memory care unit     >Hypernatremia likely due to volume depletion  - sodium normalized  - encourage PO intake  - monitor labs    >PAD   - continue Plavix, ASA and statin    >Hypothyroidism  - TSH WNL  - continue synthroid    > BPH  - continue flomax     >HLD  -interchange rosuvastatin to lipitor     DVT Prophylaxis - Lovenox    Dispo- Medically stable for discharge to [facility]; will be discharge on PO steroids and Vantin.      Plan discussed with patient's wife, daughter, SHIVA, RN

## 2023-06-26 VITALS
RESPIRATION RATE: 18 BRPM | SYSTOLIC BLOOD PRESSURE: 130 MMHG | HEART RATE: 62 BPM | OXYGEN SATURATION: 96 % | DIASTOLIC BLOOD PRESSURE: 74 MMHG | TEMPERATURE: 98 F

## 2023-06-26 LAB
SARS-COV-2 RNA SPEC QL NAA+PROBE: SIGNIFICANT CHANGE UP

## 2023-06-26 PROCEDURE — 87641 MR-STAPH DNA AMP PROBE: CPT

## 2023-06-26 PROCEDURE — 84484 ASSAY OF TROPONIN QUANT: CPT

## 2023-06-26 PROCEDURE — 80053 COMPREHEN METABOLIC PANEL: CPT

## 2023-06-26 PROCEDURE — 70498 CT ANGIOGRAPHY NECK: CPT | Mod: MA

## 2023-06-26 PROCEDURE — 85027 COMPLETE CBC AUTOMATED: CPT

## 2023-06-26 PROCEDURE — 83735 ASSAY OF MAGNESIUM: CPT

## 2023-06-26 PROCEDURE — 93005 ELECTROCARDIOGRAM TRACING: CPT

## 2023-06-26 PROCEDURE — 87640 STAPH A DNA AMP PROBE: CPT

## 2023-06-26 PROCEDURE — 99239 HOSP IP/OBS DSCHRG MGMT >30: CPT

## 2023-06-26 PROCEDURE — C8929: CPT

## 2023-06-26 PROCEDURE — 36415 COLL VENOUS BLD VENIPUNCTURE: CPT

## 2023-06-26 PROCEDURE — 82550 ASSAY OF CK (CPK): CPT

## 2023-06-26 PROCEDURE — 87635 SARS-COV-2 COVID-19 AMP PRB: CPT

## 2023-06-26 PROCEDURE — 81001 URINALYSIS AUTO W/SCOPE: CPT

## 2023-06-26 PROCEDURE — 92610 EVALUATE SWALLOWING FUNCTION: CPT

## 2023-06-26 PROCEDURE — 96374 THER/PROPH/DIAG INJ IV PUSH: CPT | Mod: XU

## 2023-06-26 PROCEDURE — 97163 PT EVAL HIGH COMPLEX 45 MIN: CPT

## 2023-06-26 PROCEDURE — 70496 CT ANGIOGRAPHY HEAD: CPT | Mod: MA

## 2023-06-26 PROCEDURE — 70551 MRI BRAIN STEM W/O DYE: CPT | Mod: MA

## 2023-06-26 PROCEDURE — 87186 SC STD MICRODIL/AGAR DIL: CPT

## 2023-06-26 PROCEDURE — 92526 ORAL FUNCTION THERAPY: CPT

## 2023-06-26 PROCEDURE — 83036 HEMOGLOBIN GLYCOSYLATED A1C: CPT

## 2023-06-26 PROCEDURE — 85610 PROTHROMBIN TIME: CPT

## 2023-06-26 PROCEDURE — 87086 URINE CULTURE/COLONY COUNT: CPT

## 2023-06-26 PROCEDURE — 97167 OT EVAL HIGH COMPLEX 60 MIN: CPT

## 2023-06-26 PROCEDURE — 70450 CT HEAD/BRAIN W/O DYE: CPT | Mod: MA

## 2023-06-26 PROCEDURE — 82962 GLUCOSE BLOOD TEST: CPT

## 2023-06-26 PROCEDURE — 80048 BASIC METABOLIC PNL TOTAL CA: CPT

## 2023-06-26 PROCEDURE — 85025 COMPLETE CBC W/AUTO DIFF WBC: CPT

## 2023-06-26 PROCEDURE — 85730 THROMBOPLASTIN TIME PARTIAL: CPT

## 2023-06-26 PROCEDURE — 80061 LIPID PANEL: CPT

## 2023-06-26 PROCEDURE — 86803 HEPATITIS C AB TEST: CPT

## 2023-06-26 PROCEDURE — 99285 EMERGENCY DEPT VISIT HI MDM: CPT

## 2023-06-26 PROCEDURE — 71045 X-RAY EXAM CHEST 1 VIEW: CPT

## 2023-06-26 PROCEDURE — 84443 ASSAY THYROID STIM HORMONE: CPT

## 2023-06-26 PROCEDURE — 0042T: CPT

## 2023-06-26 RX ORDER — CEFPODOXIME PROXETIL 100 MG
1 TABLET ORAL
Qty: 4 | Refills: 0
Start: 2023-06-26 | End: 2023-06-27

## 2023-06-26 RX ORDER — ASPIRIN/CALCIUM CARB/MAGNESIUM 324 MG
1 TABLET ORAL
Qty: 0 | Refills: 0 | DISCHARGE
Start: 2023-06-26

## 2023-06-26 RX ADMIN — Medication 4 MILLIGRAM(S): at 05:34

## 2023-06-26 RX ADMIN — Medication 75 MICROGRAM(S): at 05:34

## 2023-06-26 RX ADMIN — CHLORHEXIDINE GLUCONATE 1 APPLICATION(S): 213 SOLUTION TOPICAL at 05:34

## 2023-06-26 RX ADMIN — CEFTRIAXONE 1000 MILLIGRAM(S): 500 INJECTION, POWDER, FOR SOLUTION INTRAMUSCULAR; INTRAVENOUS at 05:34

## 2023-06-26 RX ADMIN — Medication 1 DROP(S): at 05:34

## 2023-06-26 NOTE — DISCHARGE NOTE PROVIDER - NSDCMRMEDTOKEN_GEN_ALL_CORE_FT
aspirin 81 mg oral tablet, chewable: 1 tab(s) orally once a day  bisacodyl 10 mg rectal suppository: 1 suppository(ies) rectally once a day as needed for  constipation  clopidogrel 75 mg oral tablet: 1 tab(s) orally once a day  escitalopram 5 mg oral tablet: 0.5 tab(s) orally once a day (in the morning)  Fleet Enema 19 g-7 g rectal enema: 133 milliliter(s) rectally once a day as needed for  constipation  levothyroxine 75 mcg (0.075 mg) oral tablet: 1 tab(s) orally once a day  memantine 10 mg oral tablet: 1 tab(s) orally once a day  memantine 5 mg oral tablet: 1 tab(s) orally once a day (at bedtime)  rosuvastatin 20 mg oral capsule: 1 tab(s) orally once a day  tamsulosin 0.4 mg oral capsule: 1 cap(s) orally once a day  Triple Antibiotic topical ointment: Apply topically to affected area to R and L heel fissures every shift   aspirin 81 mg oral tablet, chewable: 1 tab(s) orally once a day  bisacodyl 10 mg rectal suppository: 1 suppository(ies) rectally once a day as needed for  constipation  clopidogrel 75 mg oral tablet: 1 tab(s) orally once a day  escitalopram 5 mg oral tablet: 0.5 tab(s) orally once a day (in the morning)  Fleet Enema 19 g-7 g rectal enema: 133 milliliter(s) rectally once a day as needed for  constipation  levothyroxine 75 mcg (0.075 mg) oral tablet: 1 tab(s) orally once a day  memantine 10 mg oral tablet: 1 tab(s) orally once a day  memantine 5 mg oral tablet: 1 tab(s) orally once a day (at bedtime)  rosuvastatin 20 mg oral capsule: 1 tab(s) orally once a day  tamsulosin 0.4 mg oral capsule: 1 cap(s) orally once a day

## 2023-06-26 NOTE — DISCHARGE NOTE NURSING/CASE MANAGEMENT/SOCIAL WORK - NSDCCRNAME_GEN_ALL_CORE_FT
[facility]  [address] [phone]   [facility]  [address] [phone]   [facility]  [address] [phone]

## 2023-06-26 NOTE — DISCHARGE NOTE PROVIDER - NSDCCPCAREPLAN_GEN_ALL_CORE_FT
PRINCIPAL DISCHARGE DIAGNOSIS  Diagnosis: Facial paralysis/[place] palsy  Assessment and Plan of Treatment: started medrol dose pack on 6/24. Neurology cleared      SECONDARY DISCHARGE DIAGNOSES  Diagnosis: Metabolic encephalopathy  Assessment and Plan of Treatment: superimposed on baseline Dementia due to E. Coli UTI. Ceftriaxone switched to Vantin upon discharge    Diagnosis: E. coli UTI  Assessment and Plan of Treatment: Ceftriaxone switched to Vantin upon discharge    Diagnosis: Dementia  Assessment and Plan of Treatment: continue Memantine, supportive care,  resides in a memory care unit    Diagnosis: Hypernatremia  Assessment and Plan of Treatment: likely due to volume depletion  sodium normalized, PO intake encouraged    Diagnosis: PAD (peripheral artery disease)  Assessment and Plan of Treatment: on Plavix, ASA and statin.       Diagnosis: Hypothyroidism  Assessment and Plan of Treatment: TSH WNL,  on Synthroid.    Diagnosis: History of BPH  Assessment and Plan of Treatment: c/w Flomax     PRINCIPAL DISCHARGE DIAGNOSIS  Diagnosis: Facial paralysis/[place] palsy  Assessment and Plan of Treatment: started medrol dose pack on 6/24. Neurology cleared      SECONDARY DISCHARGE DIAGNOSES  Diagnosis: Metabolic encephalopathy  Assessment and Plan of Treatment: superimposed on baseline Dementia due to E. Coli UTI. Ceftriaxone switched to Vantin upon discharge    Diagnosis: E. coli UTI  Assessment and Plan of Treatment: Ceftriaxone switched to Vantin upon discharge    Diagnosis: Dementia  Assessment and Plan of Treatment: continue Memantine, supportive care,  resides in a memory care unit    Diagnosis: Hypernatremia  Assessment and Plan of Treatment: likely due to volume depletion  sodium normalized, PO intake encouraged    Diagnosis: PAD (peripheral artery disease)  Assessment and Plan of Treatment: on Plavix, ASA and statin.       Diagnosis: Hypothyroidism  Assessment and Plan of Treatment: TSH WNL,  on Synthroid.    Diagnosis: History of BPH  Assessment and Plan of Treatment: c/w Flomax     PRINCIPAL DISCHARGE DIAGNOSIS  Diagnosis: Facial paralysis/[place] palsy  Assessment and Plan of Treatment: started medrol dose pack on 6/24. Neurology cleared      SECONDARY DISCHARGE DIAGNOSES  Diagnosis: Metabolic encephalopathy  Assessment and Plan of Treatment: superimposed on baseline Dementia due to E. Coli UTI. Ceftriaxone switched to Vantin upon discharge    Diagnosis: E. coli UTI  Assessment and Plan of Treatment: Ceftriaxone switched to Vantin upon discharge    Diagnosis: Dementia  Assessment and Plan of Treatment: continue Memantine, supportive care,  resides in a memory care unit    Diagnosis: Hypernatremia  Assessment and Plan of Treatment: likely due to volume depletion  sodium normalized, PO intake encouraged    Diagnosis: PAD (peripheral artery disease)  Assessment and Plan of Treatment: on Plavix, ASA and statin.       Diagnosis: Hypothyroidism  Assessment and Plan of Treatment: TSH WNL,  on Synthroid.    Diagnosis: History of BPH  Assessment and Plan of Treatment: c/w Flomax     PRINCIPAL DISCHARGE DIAGNOSIS  Diagnosis: Facial paralysis/[place] palsy  Assessment and Plan of Treatment: started medrol dose pack on 6/24 and please continue as instructed. No evidence of CVA on MRI. Stable for discharge back to memory care unit.      SECONDARY DISCHARGE DIAGNOSES  Diagnosis: Metabolic encephalopathy  Assessment and Plan of Treatment: superimposed on baseline Dementia due to E. Coli UTI. Ceftriaxone switched to Vantin upon discharge; please complete course on 6/29.    Diagnosis: E. coli UTI  Assessment and Plan of Treatment: Ceftriaxone switched to Vantin upon discharge on 6/29.    Diagnosis: Dementia  Assessment and Plan of Treatment: continue Memantine, supportive care,  resides in a memory care unit  supportive care    Diagnosis: Hypernatremia  Assessment and Plan of Treatment: likely due to volume depletion  sodium normalized, PO intake encouraged    Diagnosis: PAD (peripheral artery disease)  Assessment and Plan of Treatment: on Plavix, ASA and statin.       Diagnosis: Hypothyroidism  Assessment and Plan of Treatment: TSH WNL,  on Synthroid.    Diagnosis: History of BPH  Assessment and Plan of Treatment: c/w Flomax     PRINCIPAL DISCHARGE DIAGNOSIS  Diagnosis: Facial paralysis/[place] palsy  Assessment and Plan of Treatment: started medrol dose pack on 6/24 and please continue as instructed. No evidence of CVA on MRI. Stable for discharge back to memory care unit.      SECONDARY DISCHARGE DIAGNOSES  Diagnosis: Metabolic encephalopathy  Assessment and Plan of Treatment: superimposed on baseline Dementia due to E. Coli UTI. Ceftriaxone switched to Vantin upon discharge; please complete course on 6/29.    Diagnosis: E. coli UTI  Assessment and Plan of Treatment: Ceftriaxone switched to Vantin upon discharge on 6/29.    Diagnosis: Dementia  Assessment and Plan of Treatment: continue Memantine, supportive care,  resides in a memory care unit  supportive care    Diagnosis: Hypernatremia  Assessment and Plan of Treatment: likely due to volume depletion  sodium normalized, PO intake encouraged    Diagnosis: PAD (peripheral artery disease)  Assessment and Plan of Treatment: on Plavix, ASA and statin.       Diagnosis: Hypothyroidism  Assessment and Plan of Treatment: TSH WNL,  on Synthroid.    Diagnosis: History of BPH  Assessment and Plan of Treatment: c/w Flomax     PRINCIPAL DISCHARGE DIAGNOSIS  Diagnosis: Facial paralysis/[place] palsy  Assessment and Plan of Treatment: started medrol dose pack on 6/24 and please continue as instructed. No evidence of CVA on MRI. Stable for discharge back to memory care unit.      SECONDARY DISCHARGE DIAGNOSES  Diagnosis: Metabolic encephalopathy  Assessment and Plan of Treatment: superimposed on baseline Dementia due to E. Coli UTI. Ceftriaxone switched to Vantin upon discharge; please complete course on 6/29.    Diagnosis: E. coli UTI  Assessment and Plan of Treatment: Ceftriaxone switched to Vantin upon discharge on 6/29.    Diagnosis: Dementia  Assessment and Plan of Treatment: continue Memantine, supportive care,  resides in a memory care unit  supportive care    Diagnosis: Hypernatremia  Assessment and Plan of Treatment: likely due to volume depletion  sodium normalized, PO intake encouraged    Diagnosis: PAD (peripheral artery disease)  Assessment and Plan of Treatment: on Plavix, ASA and statin.       Diagnosis: Hypothyroidism  Assessment and Plan of Treatment: TSH WNL,  on Synthroid.    Diagnosis: History of BPH  Assessment and Plan of Treatment: c/w Flomax

## 2023-06-26 NOTE — DISCHARGE NOTE PROVIDER - CARE PROVIDER_API CALL
Santi Cho  Neurology  [address] [place] [address], [facility] [address]  [place] [address]  Phone: [phone]  Fax: [phone]  Follow Up Time: Routine   Santi Cho  Neurology  [address] [place] [address], [facility] [address]  [place] [address]  Phone: [phone]  Fax: [phone]  Follow Up Time: Routine   Santi Cho  Neurology  [address] [place] [address], [facility] [address]  [place] [address]  Phone: [phone]  Fax: [phone]  Follow Up Time: Routine

## 2023-06-26 NOTE — DISCHARGE NOTE NURSING/CASE MANAGEMENT/SOCIAL WORK - SOCIAL WORKER'S NAME
Shannan Landrum [facility]  Shannan Landrum [facility]  Shannan Landrum [facility]

## 2023-06-26 NOTE — DISCHARGE NOTE NURSING/CASE MANAGEMENT/SOCIAL WORK - NSDCPEFALRISK_GEN_ALL_CORE
For information on Fall & Injury Prevention, visit: [email or web address]/[email or web address]-[email or web address]-mobility OR  [email or web address].[email or web address]/[email or web address]-tips-[email or web address]-injury OR  https://www.cdc.gov/steadi/patient.html For information on Fall & Injury Prevention, visit: [email or web address]://www.[facility]/news/fall-prevention-protects-and-maintains-health-and-mobility OR  https://www.[facility]/news/fall-prevention-tips-to-avoid-injury OR  https://www.cdc.gov/steadi/patient.html For information on Fall & Injury Prevention, visit: [email or web address]://www.[facility]/news/fall-prevention-protects-and-maintains-health-and-mobility OR  https://www.[facility]/news/fall-prevention-tips-to-avoid-injury OR  https://www.cdc.gov/steadi/patient.html

## 2023-06-26 NOTE — DISCHARGE NOTE PROVIDER - HOSPITAL COURSE
Patient is a 77 year old male with history of Dementia, PAD s/p LE Stents x 2, HTN (diet controlled), HLD, Hypothyroidism and BPH brought by EMS with right facial droop.   History was taken from wife at bedside as patient is unable to provide any information due to his baseline dementia. Per wife, patient was doing well until the morning of admission   when she went to visit him and noticed his right eye was drooping. A Few minutes later she noticed right facial droop as well so she called the NH staff who confirmed that these are new changes so EMS was called. He does not speak much but there was no change from baseline. No arm/leg weakness. He had breakfast this morning with no difficulty swallowing.  In ER, he was Code Stroke. NIH 1. Not a candidate for Tenecteplase. CT Scans with no acute findings. MRI also negative. 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  CVA ruled out. Neurology consulted. CT and MRI reviewed - negative for acute or subacute infarct  Medrol dose pack started on 6/24. Metabolic Encephalopathy superimposed on baseline Dementia due to E. Coli UTI. Urine culture sensitivities reviewed, started on Ceftriaxone and   switched to Vantin upon discharge. On Memantine. Hypernatremia likely due to volume depletion  sodium normalized, PO intake encouraged.   PAD on Plavix, ASA and statin. Hypothyroidism - TSH WNL,  on Synthroid.       Dispo- Medically stable for discharge to [facility]; will be discharge on PO steroids and Vantin.  supportive care. Resides in a memory care unit. Patient is a 77 year old male with history of Dementia, PAD s/p LE Stents x 2, HTN (diet controlled), HLD, Hypothyroidism and BPH brought by EMS with right facial droop.   History was taken from wife at bedside as patient is unable to provide any information due to his baseline dementia. Per wife, patient was doing well until the morning of admission   when she went to visit him and noticed his right eye was drooping. A Few minutes later she noticed right facial droop as well so she called the NH staff who confirmed that these are new changes so EMS was called. He does not speak much but there was no change from baseline. No arm/leg weakness. He had breakfast this morning with no difficulty swallowing.  In ER, he was Code Stroke. NIH 1. Not a candidate for Tenecteplase. CT Scans with no acute findings. MRI also negative. 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  CVA ruled out. Neurology consulted. CT and MRI reviewed - negative for acute or subacute infarct  Medrol dose pack started on 6/24. Metabolic Encephalopathy superimposed on baseline Dementia due to E. Coli UTI. Urine culture sensitivities reviewed, started on Ceftriaxone and   switched to Vantin upon discharge. On Memantine. Hypernatremia likely due to volume depletion  sodium normalized, PO intake encouraged.   PAD on Plavix, ASA and statin. Hypothyroidism - TSH WNL,  on Synthroid.       Dispo- Medically stable for discharge to [facility]; will be discharge on PO steroids and Vantin.  supportive care. Resides in a memory care unit. Patient is a 77 year old male with history of Dementia, PAD s/p LE Stents x 2, HTN (diet controlled), HLD, Hypothyroidism and BPH brought by EMS with right facial droop.   History was taken from wife at bedside as patient is unable to provide any information due to his baseline dementia. Per wife, patient was doing well until the morning of admission   when she went to visit him and noticed his right eye was drooping. A Few minutes later she noticed right facial droop as well so she called the NH staff who confirmed that these are new changes so EMS was called. He does not speak much but there was no change from baseline. No arm/leg weakness. He had breakfast this morning with no difficulty swallowing.  In ER, he was Code Stroke. NIH 1. Not a candidate for Tenecteplase. CT Scans with no acute findings. MRI also negative. 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  CVA ruled out. Neurology consulted. CT and MRI reviewed - negative for acute or subacute infarct  Medrol dose pack started on 6/24. Metabolic Encephalopathy superimposed on baseline Dementia due to E. Coli UTI. Urine culture sensitivities reviewed, started on Ceftriaxone and   switched to Vantin upon discharge. On Memantine. Hypernatremia likely due to volume depletion  sodium normalized, PO intake encouraged.   PAD on Plavix, ASA and statin. Hypothyroidism - TSH WNL,  on Synthroid.       Dispo- Medically stable for discharge to [facility]; will be discharge on PO steroids and Vantin.  supportive care. Resides in a memory care unit. Patient is a 77 year old male with history of Dementia, PAD s/p LE Stents x 2, HTN (diet controlled), HLD, Hypothyroidism and BPH brought by EMS with right facial droop.  History was taken from wife at bedside as patient is unable to provide any information due to his baseline dementia. Per wife, patient was doing well until the morning of admission when she went to visit him and noticed his right eye was drooping. A Few minutes later she noticed right facial droop as well so she called the NH staff who confirmed that these are new changes so EMS was called. He does not speak much but there was no change from baseline. No arm/leg weakness. He had breakfast this morning with no difficulty swallowing.  In ER, he was Code Stroke. NIH 1. Not a candidate for Tenecteplase. CT Scans with no acute findings. MRI also negative. 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  CVA ruled out. Neurology consulted. CT and MRI reviewed - negative for acute or subacute infarct.  Medrol dose pack started on 6/24. Metabolic Encephalopathy superimposed on baseline Dementia due to E. Coli UTI. Urine culture sensitivities reviewed, started on Ceftriaxone and switched to Vantin upon discharge. On Memantine. Hypernatremia likely due to volume depletion  sodium normalized, PO intake encouraged. PAD on Plavix, ASA and statin. Hypothyroidism - TSH WNL,  on Synthroid.       Dispo- Medically stable for discharge to [facility]; will be discharge on PO steroids and Vantin.  supportive care. Resides in a memory care unit. Patient is a 77 year old male with history of Dementia, PAD s/p LE Stents x 2, HTN (diet controlled), HLD, Hypothyroidism and BPH brought by EMS with right facial droop.  History was taken from wife at bedside as patient is unable to provide any information due to his baseline dementia. Per wife, patient was doing well until the morning of admission when she went to visit him and noticed his right eye was drooping. A Few minutes later she noticed right facial droop as well so she called the NH staff who confirmed that these are new changes so EMS was called. He does not speak much but there was no change from baseline. No arm/leg weakness. He had breakfast this morning with no difficulty swallowing.  In ER, he was Code Stroke. NIH 1. Not a candidate for Tenecteplase. CT Scans with no acute findings. MRI also negative. 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  CVA ruled out. Neurology consulted. CT and MRI reviewed - negative for acute or subacute infarct.  Medrol dose pack started on 6/24. Metabolic Encephalopathy superimposed on baseline Dementia due to E. Coli UTI. Urine culture sensitivities reviewed, started on Ceftriaxone and switched to Vantin upon discharge. On Memantine. Hypernatremia likely due to volume depletion  sodium normalized, PO intake encouraged. PAD on Plavix, ASA and statin. Hypothyroidism - TSH WNL,  on Synthroid.       Dispo- Medically stable for discharge to [facility]; will be discharge on PO steroids and Vantin.  supportive care. Resides in a memory care unit. Patient is a 77 year old male with history of Dementia, PAD s/p LE Stents x 2, HTN (diet controlled), HLD, Hypothyroidism and BPH brought by EMS with right facial droop.  History was taken from wife at bedside as patient is unable to provide any information due to his baseline dementia. Per wife, patient was doing well until the morning of admission when she went to visit him and noticed his right eye was drooping. A Few minutes later she noticed right facial droop as well so she called the NH staff who confirmed that these are new changes so EMS was called. He does not speak much but there was no change from baseline. No arm/leg weakness. He had breakfast this morning with no difficulty swallowing.  In ER, he was Code Stroke. NIH 1. Not a candidate for Tenecteplase. CT Scans with no acute findings. MRI also negative. 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  CVA ruled out. Neurology consulted. CT and MRI reviewed - negative for acute or subacute infarct.  Medrol dose pack started on 6/24. Metabolic Encephalopathy superimposed on baseline Dementia due to E. Coli UTI. Urine culture sensitivities reviewed, started on Ceftriaxone and switched to Vantin upon discharge. On Memantine. Hypernatremia likely due to volume depletion  sodium normalized, PO intake encouraged. PAD on Plavix, ASA and statin. Hypothyroidism - TSH WNL,  on Synthroid.       Dispo- Medically stable for discharge to [facility]; will be discharge on PO steroids and Vantin.  supportive care. Resides in a memory care unit.

## 2023-06-26 NOTE — DISCHARGE NOTE NURSING/CASE MANAGEMENT/SOCIAL WORK - PATIENT PORTAL LINK FT
You can access the FollowMyHealth Patient Portal offered by [facility] by registering at the following website: [email or web address]. By joining [facility]’s FollowMyHealth portal, you will also be able to view your health information using other applications (apps) compatible with our system. You can access the FollowMyHealth Patient Portal offered by [facility] by registering at the following website: [email or web address]. By joining [facility]’s FollowMyHealth portal, you will also be able to view your health information using other applications (apps) compatible with our system. You can access the FollowMyHealth Patient Portal offered by [facility] by registering at the following website: [email or web address]. By joining [facility] [employer] [employer]’s FollowMyHealth portal, you will also be able to view your health information using other applications (apps) compatible with our system.

## 2023-09-12 ENCOUNTER — EMERGENCY (EMERGENCY)
Facility: HOSPITAL | Age: 78
LOS: 1 days | Discharge: HOME | End: 2023-09-12
Attending: STUDENT IN AN ORGANIZED HEALTH CARE EDUCATION/TRAINING PROGRAM | Admitting: STUDENT IN AN ORGANIZED HEALTH CARE EDUCATION/TRAINING PROGRAM
Payer: MEDICARE

## 2023-09-12 VITALS
RESPIRATION RATE: 18 BRPM | TEMPERATURE: 98 F | WEIGHT: 199.96 LBS | DIASTOLIC BLOOD PRESSURE: 78 MMHG | SYSTOLIC BLOOD PRESSURE: 122 MMHG | HEIGHT: 72 IN | HEART RATE: 72 BPM | OXYGEN SATURATION: 97 %

## 2023-09-12 DIAGNOSIS — Z98.890 OTHER SPECIFIED POSTPROCEDURAL STATES: Chronic | ICD-10-CM

## 2023-09-12 DIAGNOSIS — Z95.9 PRESENCE OF CARDIAC AND VASCULAR IMPLANT AND GRAFT, UNSPECIFIED: Chronic | ICD-10-CM

## 2023-09-12 DIAGNOSIS — Z98.61 CORONARY ANGIOPLASTY STATUS: Chronic | ICD-10-CM

## 2023-09-12 DIAGNOSIS — Z96.652 PRESENCE OF LEFT ARTIFICIAL KNEE JOINT: Chronic | ICD-10-CM

## 2023-09-12 PROCEDURE — 99284 EMERGENCY DEPT VISIT MOD MDM: CPT | Mod: FS,25

## 2023-09-12 PROCEDURE — 12002 RPR S/N/AX/GEN/TRNK2.6-7.5CM: CPT

## 2023-09-12 PROCEDURE — 90715 TDAP VACCINE 7 YRS/> IM: CPT

## 2023-09-12 PROCEDURE — 90471 IMMUNIZATION ADMIN: CPT

## 2023-09-12 PROCEDURE — 70450 CT HEAD/BRAIN W/O DYE: CPT | Mod: 26,MA

## 2023-09-12 PROCEDURE — 99284 EMERGENCY DEPT VISIT MOD MDM: CPT | Mod: 25

## 2023-09-12 PROCEDURE — 70450 CT HEAD/BRAIN W/O DYE: CPT | Mod: MA

## 2023-09-12 RX ORDER — TETANUS TOXOID, REDUCED DIPHTHERIA TOXOID AND ACELLULAR PERTUSSIS VACCINE, ADSORBED 5; 2.5; 8; 8; 2.5 [IU]/.5ML; [IU]/.5ML; UG/.5ML; UG/.5ML; UG/.5ML
0.5 SUSPENSION INTRAMUSCULAR ONCE
Refills: 0 | Status: COMPLETED | OUTPATIENT
Start: 2023-09-12 | End: 2023-09-12

## 2023-09-12 RX ADMIN — TETANUS TOXOID, REDUCED DIPHTHERIA TOXOID AND ACELLULAR PERTUSSIS VACCINE, ADSORBED 0.5 MILLILITER(S): 5; 2.5; 8; 8; 2.5 SUSPENSION INTRAMUSCULAR at 21:30
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
## 2023-09-12 NOTE — ED PROVIDER NOTE - NSICDXPASTMEDICALHX_GEN_ALL_CORE_FT
Problem: Altered Mood, Depressive Behavior:  Goal: Able to verbalize acceptance of life and situations over which he or she has no control  Able to verbalize acceptance of life and situations over which he or she has no control   Outcome: Ongoing  Pt encouraged to participate in unit group programming/work with staff to identify any other life situations that are not within ability of control and to learn how to accept and deal with them. Goal: Able to verbalize and/or display a decrease in depressive symptoms  Able to verbalize and/or display a decrease in depressive symptoms   Outcome: Ongoing  Pt denies any depression at this time. Pt is aloof of peers when out in milieu. Goal: Absence of self-harm  Absence of self-harm   Outcome: Ongoing  Pt is free of self harm at this time. Pt agrees to seek out staff if thoughts to harm self arise. Staff will provide support and reassurance as needed. Safety checks maintained every 15 minutes. PAST MEDICAL HISTORY:  BPH (benign prostatic hyperplasia)     CAD (coronary artery disease)     Essential hypertension     HLD (hyperlipidemia)     Hypothyroid     Memory loss     Myocardial infarction 2002,2006    JILLIAN on CPAP     Other secondary osteoarthritis of left knee

## 2023-09-12 NOTE — ED PROVIDER NOTE - CARE PLAN
1 Principal Discharge DX:	Head injury   Principal Discharge DX:	Head injury  Secondary Diagnosis:	Laceration of scalp
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
## 2023-09-12 NOTE — ED PROVIDER NOTE - NSFOLLOWUPINSTRUCTIONS_ED_ALL_ED_FT
Staples should be removed in 5-7 days. Return     Head Injury, Adult    There are many types of head injuries. Head injuries can be as minor as a small bump, or they can be a serious medical issue. More severe head injuries include:  A jarring injury to the brain (concussion).  A bruise (contusion) of the brain. This means there is bleeding in the brain that can cause swelling.  A cracked skull (skull fracture).  Bleeding in the brain that collects, clots, and forms a bump (hematoma).  After a head injury, most problems occur within the first 24 hours, but side effects may occur up to 7–10 days after the injury. It is important to watch your condition for any changes. You may need to be observed in the emergency department or urgent care, or you may be admitted to the hospital.    What are the causes?  There are many possible causes of a head injury. Serious head injuries may be caused by car accidents, bicycle or motorcycle accidents, sports injuries, falls, or being struck by an object.    What are the symptoms?  Symptoms of a head injury include a contusion, bump, or bleeding at the site of the injury. Other physical symptoms may include:  Headache.  Nausea or vomiting.  Dizziness.  Blurred or double vision.  Being uncomfortable around bright lights or loud noises.  Seizures.  Feeling tired.  Trouble being awakened.  Loss of consciousness.  Mental or emotional symptoms may include:  Irritability.  Confusion and memory problems.  Poor attention and concentration.  Changes in eating or sleeping habits.  Anxiety or depression.  How is this diagnosed?  This condition can usually be diagnosed based on your symptoms, a description of the injury, and a physical exam. You may also have imaging tests done, such as a CT scan or an MRI.    How is this treated?  Treatment for this condition depends on the severity and type of injury you have. The main goal of treatment is to prevent complications and allow the brain time to heal.    Mild head injury    If you have a mild head injury, you may be sent home, and treatment may include:  Observation. A responsible adult should stay with you for 24 hours after your injury and check on you often.  Physical rest.  Brain rest.  Pain medicines.  Severe head injury    If you have a severe head injury, treatment may include:  Close observation. This includes hospitalization with the following care:  Frequent physical exams.  Frequent checks of how your brain and nervous system are working (neurological status).  Checking your blood pressure and oxygen levels.  Medicines to relieve pain, prevent seizures, and decrease brain swelling.  Airway protection and breathing support. This may include using a ventilator.  Treatments that monitor and manage swelling inside the brain.  Brain surgery. This may be needed to:  Remove a collection of blood or blood clots.  Stop the bleeding.  Remove a part of the skull to allow room for the brain to swell.  Follow these instructions at home:  Activity    Rest and avoid activities that are physically hard or tiring.  Make sure you get enough sleep.  Let your brain rest by limiting activities that require a lot of thought or attention, such as:  Watching TV.  Playing memory games and puzzles.  Job-related work or homework.  Working on the computer, using social media, and texting.  Avoid activities that could cause another head injury, such as playing sports, until your health care provider approves. Having another head injury, especially before the first one has healed, can be dangerous.  Ask your health care provider when it is safe for you to return to your regular activities, including work or school. Ask your health care provider for a step-by-step plan for gradually returning to activities.  Ask your health care provider when you can drive, ride a bicycle, or use heavy machinery. Your ability to react may be slower after a brain injury. Do not do these activities if you are dizzy.  Lifestyle      Do not drink alcohol until your health care provider approves. Do not use drugs. Alcohol and certain drugs may slow your recovery and can put you at risk of further injury.  If it is harder than usual to remember things, write them down.  If you are easily distracted, try to do one thing at a time.  Talk with family members or close friends when making important decisions.  Tell your friends, family, a trusted colleague, and  about your injury, symptoms, and restrictions. Have them watch for any new or worsening problems.  General instructions    Take over-the-counter and prescription medicines only as told by your health care provider.  Have someone stay with you for 24 hours after your head injury. This person should watch you for any changes in your symptoms and be ready to seek medical help.  Keep all follow-up visits as told by your health care provider. This is important.  How is this prevented?  Work on improving your balance and strength to avoid falls.  Wear a seat belt when you are in a moving vehicle.  Wear a helmet when riding a bicycle, skiing, or doing any other sport or activity that has a risk of injury.  If you drink alcohol:  Limit how much you use to:  0–1 drink a day for nonpregnant women.  0–2 drinks a day for men.  Be aware of how much alcohol is in your drink. In the [place] one drink equals one 12 oz bottle of beer (355 mL), one 5 oz glass of wine (148 mL), or one 1½ oz glass of hard liquor (44 mL).  Take safety measures in your home, such as:  Removing clutter and tripping hazards from floors and stairways.  Using grab bars in bathrooms and handrails by stairs.  Placing non-slip mats on floors and in bathtubs.  Improving lighting in dim areas.  Where to find more information  Centers for Disease Control and Prevention: www.cdc.gov  Get help right away if:  You have:  A severe headache that is not helped by medicine.  Trouble walking or weakness in your arms and legs.  Clear or bloody fluid coming from your nose or ears.  Changes in your vision.  A seizure.  Increased confusion or irritability.  Your symptoms get worse.  You are sleepier than normal and have trouble staying awake.  You lose your balance.  Your pupils change size.  Your speech is slurred.  Your dizziness gets worse.  You vomit.  These symptoms may represent a serious problem that is an emergency. Do not wait to see if the symptoms will go away. Get medical help right away. Call your local emergency services (911 in the [place] Do not drive yourself to the hospital.    Summary  Head injuries can be minor, or they can be a serious medical issue requiring immediate attention.  Treatment for this condition depends on the severity and type of injury you have.  Have someone stay with you for 24 hours after your injury and check on you often.  Ask your health care provider when it is safe for you to return to your regular activities, including work or school.  Head injury prevention includes wearing a seat belt in a motor vehicle, using a helmet on a bicycle, limiting alcohol use, and taking safety measures in your home.  This information is not intended to replace advice given to you by your health care provider. Make sure you discuss any questions you have with your health care provider. Staples should be removed in 5-7 days. Return for vomiting, weakness, lethargy, signs of infection, worsening condition.     Head Injury, Adult    There are many types of head injuries. Head injuries can be as minor as a small bump, or they can be a serious medical issue. More severe head injuries include:  A jarring injury to the brain (concussion).  A bruise (contusion) of the brain. This means there is bleeding in the brain that can cause swelling.  A cracked skull (skull fracture).  Bleeding in the brain that collects, clots, and forms a bump (hematoma).  After a head injury, most problems occur within the first 24 hours, but side effects may occur up to 7–10 days after the injury. It is important to watch your condition for any changes. You may need to be observed in the emergency department or urgent care, or you may be admitted to the hospital.    What are the causes?  There are many possible causes of a head injury. Serious head injuries may be caused by car accidents, bicycle or motorcycle accidents, sports injuries, falls, or being struck by an object.    What are the symptoms?  Symptoms of a head injury include a contusion, bump, or bleeding at the site of the injury. Other physical symptoms may include:  Headache.  Nausea or vomiting.  Dizziness.  Blurred or double vision.  Being uncomfortable around bright lights or loud noises.  Seizures.  Feeling tired.  Trouble being awakened.  Loss of consciousness.  Mental or emotional symptoms may include:  Irritability.  Confusion and memory problems.  Poor attention and concentration.  Changes in eating or sleeping habits.  Anxiety or depression.  How is this diagnosed?  This condition can usually be diagnosed based on your symptoms, a description of the injury, and a physical exam. You may also have imaging tests done, such as a CT scan or an MRI.    How is this treated?  Treatment for this condition depends on the severity and type of injury you have. The main goal of treatment is to prevent complications and allow the brain time to heal.    Mild head injury    If you have a mild head injury, you may be sent home, and treatment may include:  Observation. A responsible adult should stay with you for 24 hours after your injury and check on you often.  Physical rest.  Brain rest.  Pain medicines.  Severe head injury    If you have a severe head injury, treatment may include:  Close observation. This includes hospitalization with the following care:  Frequent physical exams.  Frequent checks of how your brain and nervous system are working (neurological status).  Checking your blood pressure and oxygen levels.  Medicines to relieve pain, prevent seizures, and decrease brain swelling.  Airway protection and breathing support. This may include using a ventilator.  Treatments that monitor and manage swelling inside the brain.  Brain surgery. This may be needed to:  Remove a collection of blood or blood clots.  Stop the bleeding.  Remove a part of the skull to allow room for the brain to swell.  Follow these instructions at home:  Activity    Rest and avoid activities that are physically hard or tiring.  Make sure you get enough sleep.  Let your brain rest by limiting activities that require a lot of thought or attention, such as:  Watching TV.  Playing memory games and puzzles.  Job-related work or homework.  Working on the computer, using social media, and texting.  Avoid activities that could cause another head injury, such as playing sports, until your health care provider approves. Having another head injury, especially before the first one has healed, can be dangerous.  Ask your health care provider when it is safe for you to return to your regular activities, including work or school. Ask your health care provider for a step-by-step plan for gradually returning to activities.  Ask your health care provider when you can drive, ride a bicycle, or use heavy machinery. Your ability to react may be slower after a brain injury. Do not do these activities if you are dizzy.  Lifestyle      Do not drink alcohol until your health care provider approves. Do not use drugs. Alcohol and certain drugs may slow your recovery and can put you at risk of further injury.  If it is harder than usual to remember things, write them down.  If you are easily distracted, try to do one thing at a time.  Talk with family members or close friends when making important decisions.  Tell your friends, family, a trusted colleague, and  about your injury, symptoms, and restrictions. Have them watch for any new or worsening problems.  General instructions    Take over-the-counter and prescription medicines only as told by your health care provider.  Have someone stay with you for 24 hours after your head injury. This person should watch you for any changes in your symptoms and be ready to seek medical help.  Keep all follow-up visits as told by your health care provider. This is important.  How is this prevented?  Work on improving your balance and strength to avoid falls.  Wear a seat belt when you are in a moving vehicle.  Wear a helmet when riding a bicycle, skiing, or doing any other sport or activity that has a risk of injury.  If you drink alcohol:  Limit how much you use to:  0–1 drink a day for nonpregnant women.  0–2 drinks a day for men.  Be aware of how much alcohol is in your drink. In the [place] one drink equals one 12 oz bottle of beer (355 mL), one 5 oz glass of wine (148 mL), or one 1½ oz glass of hard liquor (44 mL).  Take safety measures in your home, such as:  Removing clutter and tripping hazards from floors and stairways.  Using grab bars in bathrooms and handrails by stairs.  Placing non-slip mats on floors and in bathtubs.  Improving lighting in dim areas.  Where to find more information  Centers for Disease Control and Prevention: www.cdc.gov  Get help right away if:  You have:  A severe headache that is not helped by medicine.  Trouble walking or weakness in your arms and legs.  Clear or bloody fluid coming from your nose or ears.  Changes in your vision.  A seizure.  Increased confusion or irritability.  Your symptoms get worse.  You are sleepier than normal and have trouble staying awake.  You lose your balance.  Your pupils change size.  Your speech is slurred.  Your dizziness gets worse.  You vomit.  These symptoms may represent a serious problem that is an emergency. Do not wait to see if the symptoms will go away. Get medical help right away. Call your local emergency services (911 in the [place] Do not drive yourself to the hospital.    Summary  Head injuries can be minor, or they can be a serious medical issue requiring immediate attention.  Treatment for this condition depends on the severity and type of injury you have.  Have someone stay with you for 24 hours after your injury and check on you often.  Ask your health care provider when it is safe for you to return to your regular activities, including work or school.  Head injury prevention includes wearing a seat belt in a motor vehicle, using a helmet on a bicycle, limiting alcohol use, and taking safety measures in your home.  This information is not intended to replace advice given to you by your health care provider. Make sure you discuss any questions you have with your health care provider.

## 2023-09-12 NOTE — ED ADULT NURSE NOTE - NSFALLRISKINTERV_ED_ALL_ED
Assistance OOB with selected safe patient handling equipment if applicable/Assistance with ambulation/Communicate fall risk and risk factors to all staff, patient, and family/Provide patient with walking aids/Provide visual cue: yellow wristband, yellow gown, etc/Reinforce activity limits and safety measures with patient and family/Call bell, personal items and telephone in reach/Instruct patient to call for assistance before getting out of bed/chair/stretcher/Non-slip footwear applied when patient is off stretcher/[place] to call system/Physically safe environment - no spills, clutter or unnecessary equipment/Purposeful Proactive Rounding/Room/bathroom lighting operational, light cord in reach
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
## 2023-09-12 NOTE — ED PROCEDURE NOTE - PROCEDURE NAME, MLM
Bed: TRMB  Expected date:   Expected time:   Means of arrival:   Comments:  Stroke alert   Laceration Repair

## 2023-09-12 NOTE — ED PROVIDER NOTE - NSICDXPASTSURGICALHX_GEN_ALL_CORE_FT
PAST SURGICAL HISTORY:  Coronary angioplasty status PCI with stents 2002 LAD,  2006 x 1    S/P arthroscopy of left knee 2014

## 2023-09-12 NOTE — ED PROVIDER NOTE - PATIENT PORTAL LINK FT
You can access the FollowMyHealth Patient Portal offered by [facility] by registering at the following website: [email or web address]. By joining [facility]’s FollowMyHealth portal, you will also be able to view your health information using other applications (apps) compatible with our system.

## 2023-09-12 NOTE — ED PROVIDER NOTE - PHYSICAL EXAMINATION
Constitutional: Awake, Alert, non-toxic. NAD  HEAD: Normocephalic, (+) parietal 3 cm laceration   EYES: EOM intact, conjunctiva and sclera are clear bilaterally.   ENT: No rhinorrhea, patent, mucous membranes pink/moist, no drooling or stridor.   NECK: Supple, non-tender  BACK: no spinal midline TTP, no rib TTP.   CARDIOVASCULAR: Normal S1, S2; regular rate and rhythm.  RESPIRATORY: Normal respiratory effort; breath sounds CTAB, no wheezes, rhonchi, or rales. Speaking in full sentences. No accessory muscle use.   ABDOMEN: Soft; non-tender, non-distended.   EXTREMITIES: Full passive and active ROM in all extremities; hips non-tender to palpation; distal pulses palpable and symmetric  SKIN: Warm, dry; good skin turgor, no apparent lesions or rashes, no ecchymosis, brisk capillary refill.  NEURO: A&O x1. CN 2-12 intact

## 2023-09-13 VITALS
SYSTOLIC BLOOD PRESSURE: 130 MMHG | DIASTOLIC BLOOD PRESSURE: 71 MMHG | HEART RATE: 74 BPM | RESPIRATION RATE: 17 BRPM | TEMPERATURE: 98 F | OXYGEN SATURATION: 98 %

## 2023-09-13 PROBLEM — I10 ESSENTIAL (PRIMARY) HYPERTENSION: Chronic | Status: ACTIVE | Noted: 2019-02-19

## 2023-09-13 PROBLEM — I25.10 ATHEROSCLEROTIC HEART DISEASE OF NATIVE CORONARY ARTERY WITHOUT ANGINA PECTORIS: Chronic | Status: ACTIVE | Noted: 2019-02-19

## 2023-09-13 PROBLEM — I21.9 ACUTE MYOCARDIAL INFARCTION, UNSPECIFIED: Chronic | Status: ACTIVE | Noted: 2019-02-19

## 2023-09-13 PROBLEM — M17.5 OTHER UNILATERAL SECONDARY OSTEOARTHRITIS OF KNEE: Chronic | Status: ACTIVE | Noted: 2019-02-19

## 2023-09-13 PROBLEM — E78.5 HYPERLIPIDEMIA, UNSPECIFIED: Chronic | Status: ACTIVE | Noted: 2019-02-19

## 2023-09-13 PROBLEM — R41.3 OTHER AMNESIA: Chronic | Status: ACTIVE | Noted: 2019-02-19

## 2023-09-13 PROBLEM — N40.0 BENIGN PROSTATIC HYPERPLASIA WITHOUT LOWER URINARY TRACT SYMPTOMS: Chronic | Status: ACTIVE | Noted: 2019-02-19

## 2023-09-13 PROBLEM — G47.33 OBSTRUCTIVE SLEEP APNEA (ADULT) (PEDIATRIC): Chronic | Status: ACTIVE | Noted: 2019-02-19

## 2023-09-13 PROBLEM — E03.9 HYPOTHYROIDISM, UNSPECIFIED: Chronic | Status: ACTIVE | Noted: 2019-02-19

## 2023-12-05 NOTE — DISCHARGE NOTE PROVIDER - NSDCADMDATE_GEN_ALL_CORE_FT
Last HVF: 12/23  Last OCT nerve: 5/23  Last dilated exam: 5/23  Tmax: 24/32  Current ocular med regimen: Cosopt twice a day in both eyes, latanoprost at night in both eyes  CCT: 590/590 thick bilaterally  S/p trab in left eye by Dr. Machado at [facility]    Patient's intraocular pressure is acceptable.  Continue current management.        12/05/23 24-2 THUAN STANDARD HVF    Parameters:  Right Eye: good  Left Eye: good     Visual Field Findings:    Right Eye: inf mild depression  Left Eye: inf depression    Stable HVFs, some small amount of fluctuation.      21-Jun-2023 15:25

## 2023-12-19 RX ORDER — BACITRACIN ZINC NEOMYCIN SULFATE POLYMYXIN B SULFATE 400; 3.5; 5 [IU]/G; MG/G; [IU]/G
1 OINTMENT TOPICAL
Refills: 0 | DISCHARGE

## 2023-12-19 RX ORDER — ROSUVASTATIN CALCIUM 5 MG/1
1 TABLET ORAL
Refills: 0 | DISCHARGE

## 2024-02-01 NOTE — PATIENT PROFILE ADULT - FUNCTIONAL SCREEN CURRENT LEVEL: BATHING, MLM
Had colon ca back in 2002  Pt recently admitted for hemorrhagic cystitis  Had a 1x episode of blood in stools early 2023.  None currently  No wt loss noted  This has self resolved  Pt not a candidate for any scopes given her age.    Stay off of asa     0 = independent

## 2024-03-29 NOTE — PATIENT PROFILE ADULT - FALL HARM RISK
Is This A New Presentation, Or A Follow-Up?: Growth What Type Of Note Output Would You Prefer (Optional)?: Bullet Format How Severe Is Your Skin Lesion?: moderate Has Your Skin Lesion Been Treated?: not been treated coagulation(Bleeding disorder R/T clinical cond/anti-coags)/surgery

## 2024-05-24 ENCOUNTER — INPATIENT (INPATIENT)
Facility: HOSPITAL | Age: 79
LOS: 3 days | Discharge: OTHER INSTITUTION | DRG: 816 | End: 2024-05-28
Attending: INTERNAL MEDICINE | Admitting: INTERNAL MEDICINE
Payer: COMMERCIAL

## 2024-05-24 VITALS
WEIGHT: 190.04 LBS | TEMPERATURE: 99 F | SYSTOLIC BLOOD PRESSURE: 109 MMHG | OXYGEN SATURATION: 95 % | HEIGHT: 70 IN | HEART RATE: 83 BPM | DIASTOLIC BLOOD PRESSURE: 65 MMHG | RESPIRATION RATE: 18 BRPM

## 2024-05-24 DIAGNOSIS — Z98.890 OTHER SPECIFIED POSTPROCEDURAL STATES: Chronic | ICD-10-CM

## 2024-05-24 DIAGNOSIS — N39.0 URINARY TRACT INFECTION, SITE NOT SPECIFIED: ICD-10-CM

## 2024-05-24 DIAGNOSIS — I25.10 ATHEROSCLEROTIC HEART DISEASE OF NATIVE CORONARY ARTERY WITHOUT ANGINA PECTORIS: ICD-10-CM

## 2024-05-24 DIAGNOSIS — Z96.652 PRESENCE OF LEFT ARTIFICIAL KNEE JOINT: Chronic | ICD-10-CM

## 2024-05-24 DIAGNOSIS — R93.2 ABNORMAL FINDINGS ON DIAGNOSTIC IMAGING OF LIVER AND BILIARY TRACT: ICD-10-CM

## 2024-05-24 DIAGNOSIS — Z29.9 ENCOUNTER FOR PROPHYLACTIC MEASURES, UNSPECIFIED: ICD-10-CM

## 2024-05-24 DIAGNOSIS — K62.89 OTHER SPECIFIED DISEASES OF ANUS AND RECTUM: ICD-10-CM

## 2024-05-24 DIAGNOSIS — G93.41 METABOLIC ENCEPHALOPATHY: ICD-10-CM

## 2024-05-24 DIAGNOSIS — N28.9 DISORDER OF KIDNEY AND URETER, UNSPECIFIED: ICD-10-CM

## 2024-05-24 DIAGNOSIS — D72.829 ELEVATED WHITE BLOOD CELL COUNT, UNSPECIFIED: ICD-10-CM

## 2024-05-24 DIAGNOSIS — Z95.9 PRESENCE OF CARDIAC AND VASCULAR IMPLANT AND GRAFT, UNSPECIFIED: Chronic | ICD-10-CM

## 2024-05-24 DIAGNOSIS — F03.90 UNSPECIFIED DEMENTIA WITHOUT BEHAVIORAL DISTURBANCE: ICD-10-CM

## 2024-05-24 DIAGNOSIS — Z98.61 CORONARY ANGIOPLASTY STATUS: Chronic | ICD-10-CM

## 2024-05-24 DIAGNOSIS — E03.9 HYPOTHYROIDISM, UNSPECIFIED: ICD-10-CM

## 2024-05-24 DIAGNOSIS — K59.00 CONSTIPATION, UNSPECIFIED: ICD-10-CM

## 2024-05-24 PROBLEM — I10 ESSENTIAL (PRIMARY) HYPERTENSION: Chronic | Status: ACTIVE | Noted: 2023-06-21

## 2024-05-24 PROBLEM — I73.9 PERIPHERAL VASCULAR DISEASE, UNSPECIFIED: Chronic | Status: ACTIVE | Noted: 2023-06-21

## 2024-05-24 PROBLEM — E78.5 HYPERLIPIDEMIA, UNSPECIFIED: Chronic | Status: ACTIVE | Noted: 2023-06-21

## 2024-05-24 LAB
ALBUMIN SERPL ELPH-MCNC: 2.4 G/DL — LOW (ref 3.3–5)
ALP SERPL-CCNC: 110 U/L — SIGNIFICANT CHANGE UP (ref 40–120)
ALT FLD-CCNC: 38 U/L — SIGNIFICANT CHANGE UP (ref 12–78)
ANION GAP SERPL CALC-SCNC: 4 MMOL/L — LOW (ref 5–17)
APPEARANCE UR: ABNORMAL
APTT BLD: 27.8 SEC — SIGNIFICANT CHANGE UP (ref 24.5–35.6)
AST SERPL-CCNC: 70 U/L — HIGH (ref 15–37)
BACTERIA # UR AUTO: ABNORMAL /HPF
BASOPHILS # BLD AUTO: 0 K/UL — SIGNIFICANT CHANGE UP (ref 0–0.2)
BASOPHILS NFR BLD AUTO: 0 % — SIGNIFICANT CHANGE UP (ref 0–2)
BILIRUB SERPL-MCNC: 0.5 MG/DL — SIGNIFICANT CHANGE UP (ref 0.2–1.2)
BILIRUB UR-MCNC: NEGATIVE — SIGNIFICANT CHANGE UP
BUN SERPL-MCNC: 45 MG/DL — HIGH (ref 7–23)
CALCIUM SERPL-MCNC: 8.8 MG/DL — SIGNIFICANT CHANGE UP (ref 8.5–10.1)
CHLORIDE SERPL-SCNC: 112 MMOL/L — HIGH (ref 96–108)
CO2 SERPL-SCNC: 26 MMOL/L — SIGNIFICANT CHANGE UP (ref 22–31)
COLOR SPEC: YELLOW — SIGNIFICANT CHANGE UP
COMMENT - URINE: SIGNIFICANT CHANGE UP
CREAT SERPL-MCNC: 1.4 MG/DL — HIGH (ref 0.5–1.3)
CRP SERPL-MCNC: 208 MG/L — HIGH
DIFF PNL FLD: ABNORMAL
EGFR: 52 ML/MIN/1.73M2 — LOW
EOSINOPHIL # BLD AUTO: 1.02 K/UL — HIGH (ref 0–0.5)
EOSINOPHIL NFR BLD AUTO: 5 % — SIGNIFICANT CHANGE UP (ref 0–6)
EPI CELLS # UR: PRESENT
ERYTHROCYTE [SEDIMENTATION RATE] IN BLOOD: 37 MM/HR — HIGH (ref 0–20)
GLUCOSE SERPL-MCNC: 89 MG/DL — SIGNIFICANT CHANGE UP (ref 70–99)
GLUCOSE UR QL: NEGATIVE MG/DL — SIGNIFICANT CHANGE UP
GRAN CASTS # UR COMP ASSIST: PRESENT
HCT VFR BLD CALC: 36.7 % — LOW (ref 39–50)
HGB BLD-MCNC: 12.4 G/DL — LOW (ref 13–17)
INR BLD: 0.89 RATIO — SIGNIFICANT CHANGE UP (ref 0.85–1.18)
KETONES UR-MCNC: NEGATIVE MG/DL — SIGNIFICANT CHANGE UP
LACTATE SERPL-SCNC: 1.8 MMOL/L — SIGNIFICANT CHANGE UP (ref 0.7–2)
LEUKOCYTE ESTERASE UR-ACNC: ABNORMAL
LYMPHOCYTES # BLD AUTO: 10 % — LOW (ref 13–44)
LYMPHOCYTES # BLD AUTO: 2.04 K/UL — SIGNIFICANT CHANGE UP (ref 1–3.3)
MANUAL SMEAR VERIFICATION: SIGNIFICANT CHANGE UP
MCHC RBC-ENTMCNC: 31.4 PG — SIGNIFICANT CHANGE UP (ref 27–34)
MCHC RBC-ENTMCNC: 33.8 GM/DL — SIGNIFICANT CHANGE UP (ref 32–36)
MCV RBC AUTO: 92.9 FL — SIGNIFICANT CHANGE UP (ref 80–100)
MONOCYTES # BLD AUTO: 1.23 K/UL — HIGH (ref 0–0.9)
MONOCYTES NFR BLD AUTO: 6 % — SIGNIFICANT CHANGE UP (ref 2–14)
NEUTROPHILS # BLD AUTO: 16.14 K/UL — HIGH (ref 1.8–7.4)
NEUTROPHILS NFR BLD AUTO: 75 % — SIGNIFICANT CHANGE UP (ref 43–77)
NEUTS BAND # BLD: 4 % — SIGNIFICANT CHANGE UP (ref 0–8)
NITRITE UR-MCNC: NEGATIVE — SIGNIFICANT CHANGE UP
NRBC # BLD: 0 /100 WBCS — SIGNIFICANT CHANGE UP (ref 0–0)
NRBC # BLD: SIGNIFICANT CHANGE UP /100 WBCS (ref 0–0)
PH UR: 5 — SIGNIFICANT CHANGE UP (ref 5–8)
PLAT MORPH BLD: NORMAL — SIGNIFICANT CHANGE UP
PLATELET # BLD AUTO: 93 K/UL — LOW (ref 150–400)
POTASSIUM SERPL-MCNC: 4.1 MMOL/L — SIGNIFICANT CHANGE UP (ref 3.5–5.3)
POTASSIUM SERPL-SCNC: 4.1 MMOL/L — SIGNIFICANT CHANGE UP (ref 3.5–5.3)
PROCALCITONIN SERPL-MCNC: 19.88 NG/ML — HIGH
PROT SERPL-MCNC: 6 G/DL — SIGNIFICANT CHANGE UP (ref 6–8.3)
PROT UR-MCNC: 100 MG/DL
PROTHROM AB SERPL-ACNC: 10.4 SEC — SIGNIFICANT CHANGE UP (ref 9.5–13)
RAPID RVP RESULT: SIGNIFICANT CHANGE UP
RBC # BLD: 3.95 M/UL — LOW (ref 4.2–5.8)
RBC # FLD: 13.9 % — SIGNIFICANT CHANGE UP (ref 10.3–14.5)
RBC BLD AUTO: NORMAL — SIGNIFICANT CHANGE UP
RBC CASTS # UR COMP ASSIST: >50 /HPF — HIGH (ref 0–4)
SARS-COV-2 RNA SPEC QL NAA+PROBE: SIGNIFICANT CHANGE UP
SODIUM SERPL-SCNC: 142 MMOL/L — SIGNIFICANT CHANGE UP (ref 135–145)
SP GR SPEC: 1.01 — SIGNIFICANT CHANGE UP (ref 1–1.03)
UROBILINOGEN FLD QL: 1 MG/DL — SIGNIFICANT CHANGE UP (ref 0.2–1)
WBC # BLD: 20.43 K/UL — HIGH (ref 3.8–10.5)
WBC # FLD AUTO: 20.43 K/UL — HIGH (ref 3.8–10.5)
WBC UR QL: >50 /HPF — HIGH (ref 0–5)

## 2024-05-24 PROCEDURE — 74176 CT ABD & PELVIS W/O CONTRAST: CPT | Mod: 26,MC

## 2024-05-24 PROCEDURE — 71250 CT THORAX DX C-: CPT | Mod: 26,MC

## 2024-05-24 PROCEDURE — 99285 EMERGENCY DEPT VISIT HI MDM: CPT

## 2024-05-24 PROCEDURE — 99222 1ST HOSP IP/OBS MODERATE 55: CPT

## 2024-05-24 RX ORDER — LANOLIN ALCOHOL/MO/W.PET/CERES
3 CREAM (GRAM) TOPICAL AT BEDTIME
Refills: 0 | Status: DISCONTINUED | OUTPATIENT
Start: 2024-05-24 | End: 2024-05-24

## 2024-05-24 RX ORDER — ROSUVASTATIN CALCIUM 5 MG/1
1 TABLET ORAL
Qty: 0 | Refills: 0 | DISCHARGE

## 2024-05-24 RX ORDER — ATORVASTATIN CALCIUM 80 MG/1
80 TABLET, FILM COATED ORAL AT BEDTIME
Refills: 0 | Status: DISCONTINUED | OUTPATIENT
Start: 2024-05-24 | End: 2024-05-28

## 2024-05-24 RX ORDER — TOLTERODINE TARTRATE 1 MG/1
4 TABLET, FILM COATED ORAL
Qty: 0 | Refills: 0 | DISCHARGE

## 2024-05-24 RX ORDER — ERTAPENEM SODIUM 1 G/1
1000 INJECTION, POWDER, LYOPHILIZED, FOR SOLUTION INTRAMUSCULAR; INTRAVENOUS ONCE
Refills: 0 | Status: COMPLETED | OUTPATIENT
Start: 2024-05-24 | End: 2024-05-24

## 2024-05-24 RX ORDER — TAMSULOSIN HYDROCHLORIDE 0.4 MG/1
1 CAPSULE ORAL
Refills: 0 | DISCHARGE

## 2024-05-24 RX ORDER — ACETAMINOPHEN 500 MG
2 TABLET ORAL
Refills: 0 | DISCHARGE

## 2024-05-24 RX ORDER — CLOPIDOGREL BISULFATE 75 MG/1
1 TABLET, FILM COATED ORAL
Refills: 0 | DISCHARGE

## 2024-05-24 RX ORDER — ASPIRIN/CALCIUM CARB/MAGNESIUM 324 MG
1 TABLET ORAL
Refills: 0 | DISCHARGE

## 2024-05-24 RX ORDER — LACTOBACILLUS ACIDOPHILUS 100MM CELL
1 CAPSULE ORAL
Refills: 0 | DISCHARGE

## 2024-05-24 RX ORDER — ROSUVASTATIN CALCIUM 5 MG/1
1 TABLET ORAL
Refills: 0 | DISCHARGE

## 2024-05-24 RX ORDER — TAMSULOSIN HYDROCHLORIDE 0.4 MG/1
0.4 CAPSULE ORAL AT BEDTIME
Refills: 0 | Status: DISCONTINUED | OUTPATIENT
Start: 2024-05-24 | End: 2024-05-28

## 2024-05-24 RX ORDER — LEVOTHYROXINE SODIUM 125 MCG
1 TABLET ORAL
Refills: 0 | DISCHARGE

## 2024-05-24 RX ORDER — PREGABALIN 225 MG/1
1 CAPSULE ORAL
Qty: 0 | Refills: 0 | DISCHARGE

## 2024-05-24 RX ORDER — SODIUM CHLORIDE 9 MG/ML
1000 INJECTION INTRAMUSCULAR; INTRAVENOUS; SUBCUTANEOUS ONCE
Refills: 0 | Status: COMPLETED | OUTPATIENT
Start: 2024-05-24 | End: 2024-05-24

## 2024-05-24 RX ORDER — ESCITALOPRAM OXALATE 10 MG/1
7.5 TABLET, FILM COATED ORAL DAILY
Refills: 0 | Status: DISCONTINUED | OUTPATIENT
Start: 2024-05-24 | End: 2024-05-28

## 2024-05-24 RX ORDER — LANOLIN ALCOHOL/MO/W.PET/CERES
5 CREAM (GRAM) TOPICAL AT BEDTIME
Refills: 0 | Status: DISCONTINUED | OUTPATIENT
Start: 2024-05-24 | End: 2024-05-28

## 2024-05-24 RX ORDER — MEMANTINE HYDROCHLORIDE 10 MG/1
0 TABLET ORAL
Qty: 0 | Refills: 0 | DISCHARGE

## 2024-05-24 RX ORDER — ACETAMINOPHEN 500 MG
650 TABLET ORAL EVERY 6 HOURS
Refills: 0 | Status: DISCONTINUED | OUTPATIENT
Start: 2024-05-24 | End: 2024-05-28

## 2024-05-24 RX ORDER — CLOPIDOGREL BISULFATE 75 MG/1
1 TABLET, FILM COATED ORAL
Qty: 0 | Refills: 0 | DISCHARGE

## 2024-05-24 RX ORDER — LANOLIN ALCOHOL/MO/W.PET/CERES
2 CREAM (GRAM) TOPICAL
Refills: 0 | DISCHARGE

## 2024-05-24 RX ORDER — MEMANTINE HYDROCHLORIDE 10 MG/1
1 TABLET ORAL
Refills: 0 | DISCHARGE

## 2024-05-24 RX ORDER — ATENOLOL 25 MG/1
1 TABLET ORAL
Qty: 0 | Refills: 0 | DISCHARGE

## 2024-05-24 RX ORDER — OMEGA-3 ACID ETHYL ESTERS 1 G
1 CAPSULE ORAL
Qty: 0 | Refills: 0 | DISCHARGE

## 2024-05-24 RX ORDER — ESCITALOPRAM OXALATE 10 MG/1
0.5 TABLET, FILM COATED ORAL
Refills: 0 | DISCHARGE

## 2024-05-24 RX ORDER — POLYETHYLENE GLYCOL 3350 17 G/17G
17 POWDER, FOR SOLUTION ORAL
Refills: 0 | Status: DISCONTINUED | OUTPATIENT
Start: 2024-05-24 | End: 2024-05-28

## 2024-05-24 RX ORDER — LACTOBACILLUS ACIDOPHILUS 100MM CELL
1 CAPSULE ORAL EVERY 12 HOURS
Refills: 0 | Status: DISCONTINUED | OUTPATIENT
Start: 2024-05-24 | End: 2024-05-28

## 2024-05-24 RX ORDER — ESCITALOPRAM OXALATE 10 MG/1
1.5 TABLET, FILM COATED ORAL
Refills: 0 | DISCHARGE

## 2024-05-24 RX ORDER — MEMANTINE HYDROCHLORIDE 10 MG/1
10 TABLET ORAL
Refills: 0 | Status: DISCONTINUED | OUTPATIENT
Start: 2024-05-24 | End: 2024-05-28

## 2024-05-24 RX ORDER — CLOPIDOGREL BISULFATE 75 MG/1
75 TABLET, FILM COATED ORAL DAILY
Refills: 0 | Status: DISCONTINUED | OUTPATIENT
Start: 2024-05-24 | End: 2024-05-28

## 2024-05-24 RX ORDER — HEPARIN SODIUM 5000 [USP'U]/ML
5000 INJECTION INTRAVENOUS; SUBCUTANEOUS EVERY 12 HOURS
Refills: 0 | Status: DISCONTINUED | OUTPATIENT
Start: 2024-05-24 | End: 2024-05-28

## 2024-05-24 RX ORDER — ERTAPENEM SODIUM 1 G/1
1000 INJECTION, POWDER, LYOPHILIZED, FOR SOLUTION INTRAMUSCULAR; INTRAVENOUS EVERY 24 HOURS
Refills: 0 | Status: COMPLETED | OUTPATIENT
Start: 2024-05-25 | End: 2024-05-27

## 2024-05-24 RX ORDER — ONDANSETRON 8 MG/1
4 TABLET, FILM COATED ORAL EVERY 8 HOURS
Refills: 0 | Status: DISCONTINUED | OUTPATIENT
Start: 2024-05-24 | End: 2024-05-28

## 2024-05-24 RX ORDER — MESALAMINE 400 MG
1000 TABLET, DELAYED RELEASE (ENTERIC COATED) ORAL AT BEDTIME
Refills: 0 | Status: DISCONTINUED | OUTPATIENT
Start: 2024-05-24 | End: 2024-05-28

## 2024-05-24 RX ORDER — LEVOTHYROXINE SODIUM 125 MCG
75 TABLET ORAL DAILY
Refills: 0 | Status: DISCONTINUED | OUTPATIENT
Start: 2024-05-24 | End: 2024-05-28

## 2024-05-24 RX ORDER — ASPIRIN/CALCIUM CARB/MAGNESIUM 324 MG
81 TABLET ORAL DAILY
Refills: 0 | Status: DISCONTINUED | OUTPATIENT
Start: 2024-05-24 | End: 2024-05-28

## 2024-05-24 RX ORDER — SODIUM CHLORIDE 9 MG/ML
1000 INJECTION INTRAMUSCULAR; INTRAVENOUS; SUBCUTANEOUS
Refills: 0 | Status: DISCONTINUED | OUTPATIENT
Start: 2024-05-24 | End: 2024-05-26

## 2024-05-24 RX ORDER — PANTOPRAZOLE SODIUM 20 MG/1
40 TABLET, DELAYED RELEASE ORAL
Refills: 0 | Status: DISCONTINUED | OUTPATIENT
Start: 2024-05-24 | End: 2024-05-28

## 2024-05-24 RX ORDER — SILODOSIN 4 MG/1
1 CAPSULE ORAL
Qty: 0 | Refills: 0 | DISCHARGE

## 2024-05-24 RX ORDER — SENNA PLUS 8.6 MG/1
2 TABLET ORAL AT BEDTIME
Refills: 0 | Status: DISCONTINUED | OUTPATIENT
Start: 2024-05-24 | End: 2024-05-28

## 2024-05-24 RX ORDER — DONEPEZIL HYDROCHLORIDE 10 MG/1
1 TABLET, FILM COATED ORAL
Qty: 0 | Refills: 0 | DISCHARGE

## 2024-05-24 RX ORDER — MAGNESIUM HYDROXIDE 400 MG/1
30 TABLET, CHEWABLE ORAL DAILY
Refills: 0 | Status: DISCONTINUED | OUTPATIENT
Start: 2024-05-24 | End: 2024-05-28

## 2024-05-24 RX ORDER — LEVOTHYROXINE SODIUM 125 MCG
1 TABLET ORAL
Qty: 0 | Refills: 0 | DISCHARGE

## 2024-05-24 RX ADMIN — TAMSULOSIN HYDROCHLORIDE 0.4 MILLIGRAM(S): 0.4 CAPSULE ORAL at 23:15

## 2024-05-24 RX ADMIN — MEMANTINE HYDROCHLORIDE 10 MILLIGRAM(S): 10 TABLET ORAL at 18:16

## 2024-05-24 RX ADMIN — ATORVASTATIN CALCIUM 80 MILLIGRAM(S): 80 TABLET, FILM COATED ORAL at 23:16

## 2024-05-24 RX ADMIN — POLYETHYLENE GLYCOL 3350 17 GRAM(S): 17 POWDER, FOR SOLUTION ORAL at 18:15

## 2024-05-24 RX ADMIN — ERTAPENEM SODIUM 120 MILLIGRAM(S): 1 INJECTION, POWDER, LYOPHILIZED, FOR SOLUTION INTRAMUSCULAR; INTRAVENOUS at 12:40

## 2024-05-24 RX ADMIN — SENNA PLUS 2 TABLET(S): 8.6 TABLET ORAL at 23:15

## 2024-05-24 RX ADMIN — SODIUM CHLORIDE 1000 MILLILITER(S): 9 INJECTION INTRAMUSCULAR; INTRAVENOUS; SUBCUTANEOUS at 14:24

## 2024-05-24 RX ADMIN — Medication 5 MILLIGRAM(S): at 23:15

## 2024-05-24 RX ADMIN — Medication 1 TABLET(S): at 18:16

## 2024-05-24 RX ADMIN — SODIUM CHLORIDE 1000 MILLILITER(S): 9 INJECTION INTRAMUSCULAR; INTRAVENOUS; SUBCUTANEOUS at 11:39

## 2024-05-24 RX ADMIN — SODIUM CHLORIDE 50 MILLILITER(S): 9 INJECTION INTRAMUSCULAR; INTRAVENOUS; SUBCUTANEOUS at 18:16

## 2024-05-24 RX ADMIN — HEPARIN SODIUM 5000 UNIT(S): 5000 INJECTION INTRAVENOUS; SUBCUTANEOUS at 18:15

## 2024-05-24 NOTE — H&P ADULT - HISTORY OF PRESENT ILLNESS
Chi Deleon is a 79 yo male with PMH of hypothyroidism, hld, atherosclerotic heart disease, Meier's Palsy, MDD, Alzheimer's disease, BPH, and peripheral vascular disease who was brought in from [facility] for elevated WBC and BUN/Cr levels. The patient has been diagnosed with UTI the past three days and has been given Rocephin for tx which has not improved his WBC. Per patient chart, WBC and BUN/Cr levels were 22.65 and 40/2.84 on 5/22 and 23.12 and 52/2.2 on 5/23. The patient is unable to verbalize any active complaints at this time, his wife at bedside reports that he seems weaker and to be ambulating less often compared to his baseline. CT Chest No Cont was done and showed   Trace bibasilar pleural effusions with dependent atelectasis. No focal   consolidation.  Cholelithiasis with distended gallbladder. Correlate with symptomatology.   If warranted right upper quadrant ultrasound can be obtained.  Bladder wall thickening with perivesicular stranding suggestive of   cystitis.  Bilateral nonspecific perinephric stranding may also be seen in the   setting of UTI.  No obstructive uropathy.  Small rectal fecal impaction with associated mild stercoral proctitis  Admitted for septic workup ,iv hydration ,iv abx

## 2024-05-24 NOTE — H&P ADULT - TIME BILLING
75minutes spent on this visit, 50% visit time spent in care co-ordination with other attendings and counselling patient ,writing admission orders ( see complete and current orders and order section) ,requesting necessary consults ,informing family about status & plan of care .I have discussed care plan with [facility] wellness/admitting /nursing   department ,outpatient PCP , hospital consultants , ER physician & med staff . Spoke to NP Catia from eldercare plan , left a message for the wife . Case d/w GI TEAM and ID consult .

## 2024-05-24 NOTE — H&P ADULT - PROBLEM SELECTOR PLAN 5
Cholelithiasis  CT noted, Seen by GI TEAM   Bowel regimen  Mesalamine suppository qhs  Monitor for BM  LFTs grossly within normal limits; trend  Follow up RUQ US

## 2024-05-24 NOTE — H&P ADULT - PROBLEM SELECTOR PROBLEM 7
[FreeTextEntry1] : Patient presents for a screening colonoscopy which will be scheduled. FOBT he will be sent to the lab. Constipation

## 2024-05-24 NOTE — H&P ADULT - PROBLEM SELECTOR PLAN 1
Recently diagnosed with UTI the past three days and has been given Rocephin for tx which has not improved his   WBC 20 here, no documented fever. CT showed bladder wall thickening with perivesicular stranding suggestive of cystitis, and bilateral nonspecific perinephric stranding which may also be seen in the setting of UTI. CT also showed cholelithiasis with distended gallbladder.  #Leukocytosis  #UTI  -continue ertapenem  -follow blood and urine cultures  -suggest RUQ US  -monitor WBC, LFT's

## 2024-05-24 NOTE — ED ADULT NURSE NOTE - OBJECTIVE STATEMENT
pt to er sent for evaluation is confused has a sanders in place unable to follow commands at times restless resp even unlabored sacral skin red intact iv started 20 angio right arm labs drawn wife at bedside with pt

## 2024-05-24 NOTE — ED PROVIDER NOTE - CARE PLAN
Principal Discharge DX:	Elevated white blood cell count   1 Principal Discharge DX:	Elevated white blood cell count  Secondary Diagnosis:	Acute lower UTI

## 2024-05-24 NOTE — ED ADULT NURSE REASSESSMENT NOTE - NS ED NURSE REASSESS COMMENT FT1
Spoke to family and advised that patient had a bed; primary nurse will give report and will be transported to the floor

## 2024-05-24 NOTE — ED ADULT NURSE NOTE - NSFALLHARMRISKINTERV_ED_ALL_ED

## 2024-05-24 NOTE — H&P ADULT - GENITOURINARY MALE
sanders cath in place/normal external genitalia/no hernia/no discharge/no mass/no tenderness/no ulcer/normal/no penile lesion/no palpable testicular mass/no scrotal mass

## 2024-05-24 NOTE — H&P ADULT - PROBLEM SELECTOR PLAN 3
Recently diagnosed with UTI the past three days and has been given Rocephin for tx which has not improved his   WBC 20 here, no documented fever. CT showed bladder wall thickening with perivesicular stranding suggestive of cystitis, and bilateral nonspecific perinephric stranding which may also be seen in the setting of UTI. CT also showed cholelithiasis with distended gallbladder.  #Leukocytosis  #UTI urine cx from  is still pending 05/24   -continue ertapenem  -follow blood and urine cultures  -suggest RUQ US  -monitor WBC, LFT's

## 2024-05-24 NOTE — CONSULT NOTE ADULT - SUBJECTIVE AND OBJECTIVE BOX
[facility]  INFECTIOUS DISEASES - [address], [place] [address]  Tel: [phone]     Fax: [phone]  =======================================================    [id number]  CARLITOS MASTER     CC: Patient is a 78y old  Male who presents with a chief complaint of elevated WBC    HPI:  79 yo male with PMH of hypothyroidism, hld, atherosclerotic heart disease, Meier's Palsy, MDD, Alzheimer's disease, BPH, and peripheral vascular disease, who was brought in from [facility] for elevated WBC and BUN/Cr levels. Patient poor historian, unable to provide any history. Per records, the patient has been diagnosed with UTI the past three days and has been given Rocephin for tx which has not improved his WBC. Per patient chart, WBC and BUN/Cr levels were 22.65 and 40/2.84 on  and 23.12 and 52/2.2 on . The wife also reported that he seemed weaker and to be ambulating less often compared to his baseline.       PAST MEDICAL & SURGICAL HISTORY:  JILLIAN on CPAP      Myocardial infarction  ,      BPH (benign prostatic hyperplasia)      Memory loss      Other secondary osteoarthritis of left knee      CAD (coronary artery disease)      Hypothyroid      Essential hypertension      HLD (hyperlipidemia)      Dementia      PAD (peripheral artery disease)      HTN (hypertension)      HLD (hyperlipidemia)      S/P arthroscopy of left knee        Coronary angioplasty status  PCI with stents  LAD,  2006 x 1      Status post arterial stent      History of left knee replacement          Social Hx:     FAMILY HISTORY:  Family history of lung cancer (Mother)  mother        Allergies    penicillin (Hives; Urticaria)  penicillin (Swelling; Rash; Hives)    Intolerances        Antibiotics:  MEDICATIONS  (STANDING):  aspirin  chewable 81 milliGRAM(s) Oral daily  atorvastatin 80 milliGRAM(s) Oral at bedtime  bisacodyl Suppository 10 milliGRAM(s) Rectal at bedtime  busPIRone 5 milliGRAM(s) Oral three times a day  clopidogrel Tablet 75 milliGRAM(s) Oral daily  escitalopram 7.5 milliGRAM(s) Oral daily  lactobacillus acidophilus 1 Tablet(s) Oral every 12 hours  levothyroxine 75 MICROGram(s) Oral daily  melatonin 5 milliGRAM(s) Oral at bedtime  memantine 10 milliGRAM(s) Oral two times a day  polyethylene glycol 3350 17 Gram(s) Oral two times a day  senna 2 Tablet(s) Oral at bedtime  sodium chloride 0.9%. 1000 milliLiter(s) (50 mL/Hr) IV Continuous <Continuous>  tamsulosin 0.4 milliGRAM(s) Oral at bedtime    MEDICATIONS  (PRN):  acetaminophen     Tablet .. 650 milliGRAM(s) Oral every 6 hours PRN Temp greater or equal to 38C (100.4F), Mild Pain (1 - 3)  aluminum hydroxide/magnesium hydroxide/simethicone Suspension 30 milliLiter(s) Oral every 4 hours PRN Dyspepsia  magnesium hydroxide Suspension 30 milliLiter(s) Oral daily PRN Constipation  ondansetron Injectable 4 milliGRAM(s) IV Push every 8 hours PRN Nausea and/or Vomiting       REVIEW OF SYSTEMS:  Unable to obtain 2/2 dementia    Physical Exam:  Vital Signs Last 24 Hrs  T(C): 36.7 (24 May 2024 13:28), Max: 37.1 (24 May 2024 09:45)  T(F): 98.1 (24 May 2024 13:28), Max: 98.7 (24 May 2024 09:45)  HR: 109 (24 May 2024 13:28) (74 - 109)  BP: 149/78 (24 May 2024 13:28) (109/65 - 149/78)  BP(mean): --  RR: 18 (24 May 2024 13:28) (18 - 18)  SpO2: 95% (24 May 2024 09:45) (95% - 95%)    Parameters below as of 24 May 2024 13:28  Patient On (Oxygen Delivery Method): room air      Height (cm): 177.8 ( @ 09:45)  Weight (kg): 86.2 ( @ 09:45)  BMI (kg/m2): 27.3 ( @ 09:45)  BSA (m2): 2.04 ( @ 09:45)  GEN: Awake, not in apparent distress  HEENT: normocephalic and atraumatic.   NECK: Supple.   LUNGS: Normal respiratory effort  HEART: Tachycardic  ABDOMEN: Soft, nontender, and nondistended.    EXTREMITIES: No leg edema.  NEUROLOGIC: Not responding to questions appropriately    Labs:      142  |  112<H>  |  45<H>  ----------------------------<  89  4.1   |  26  |  1.40<H>    Ca    8.8      24 May 2024 10:30    TPro  6.0  /  Alb  2.4<L>  /  TBili  0.5  /  DBili  x   /  AST  70<H>  /  ALT  38  /  AlkPhos  110                            12.4   20.43 )-----------( 93       ( 24 May 2024 10:30 )             36.7     PT/INR - ( 24 May 2024 10:30 )   PT: 10.4 sec;   INR: 0.89 ratio         PTT - ( 24 May 2024 10:30 )  PTT:27.8 sec  Urinalysis Basic - ( 24 May 2024 12:20 )    Color: Yellow / Appearance: Turbid / S.012 / pH: x  Gluc: x / Ketone: Negative mg/dL  / Bili: Negative / Urobili: 1.0 mg/dL   Blood: x / Protein: 100 mg/dL / Nitrite: Negative   Leuk Esterase: Large / RBC: >50 /HPF / WBC >50 /HPF   Sq Epi: x / Non Sq Epi: x / Bacteria: Moderate /HPF      LIVER FUNCTIONS - ( 24 May 2024 10:30 )  Alb: 2.4 g/dL / Pro: 6.0 g/dL / ALK PHOS: 110 U/L / ALT: 38 U/L / AST: 70 U/L / GGT: x                 Procalcitonin: 19.88 ng/mL (24 @ 10:30)      Sedimentation Rate, Erythrocyte: 37 mm/hr (24 @ 10:30)    SARS-CoV-2: NotDetec (24 @ 10:30)      RECENT CULTURES:   @ 10:30          NotDetec        All imaging and other data have been reviewed.  < from: CT Chest No Cont (24 @ 11:17) >  FINDINGS:  Limited by lack of any exogenous oral or intravenous contrast as well as   beam hardening artifact.  CHEST:  LUNGS AND LARGE AIRWAYS: Patent central airways. Trace bibasilar   dependent pleural effusions with dependent atelectatic changes.  PLEURA: As above  VESSELS: Normal caliber.  HEART: Heart size is normal. Coronary artery calcification. No   pericardial effusion.  MEDIASTINUM AND ADRIANE: No lymphadenopathy.  CHEST WALL AND LOWER NECK: Within normal limits.    ABDOMEN AND PELVIS:  LIVER: Within normal limits.  BILE DUCTS: Normal caliber.  GALLBLADDER: Cholelithiasis with marked gallbladder distention. Correlate   with symptomatology. If warranted right upper quadrant ultrasound can be   obtained.  SPLEEN:Within normal limits.  PANCREAS: Within normal limits.  ADRENALS: Within normal limits.  KIDNEYS/URETERS: No hydronephrosis. Bilateral extrarenal pelvis.   Bilateral perinephric stranding is nonspecific can be seen in the setting   of UTI. Right renal cysts.    BLADDER: Decompressed with Cosme. Notwithstanding there is   circumferential bladder wall thickening with perivesicular stranding   compatible with cystitis  REPRODUCTIVE ORGANS: Enlarged prostate    BOWEL: No bowel obstruction. Colonic diverticulosis without   diverticulitis. Small rectal fecal impaction with mild circumferential   rectal wall thickening  perirectal edema consistent with acute proctitis.   Appendix no appendicitis  PERITONEUM: No ascites.  VESSELS: Nonaneurysmal.  RETROPERITONEUM/LYMPH NODES: No lymphadenopathy.  ABDOMINAL WALL: Dependent subcutaneous edema hip regions.  BONES: Degenerative changes. Grade 1 anterolisthesis L4 on L5.    IMPRESSION:  Limited by lack of any exogenous contrast as well as beam hardening   artifact.    Trace bibasilar pleural effusions with dependent atelectasis. No focal   consolidation.    Cholelithiasis with distended gallbladder. Correlate with symptomatology.   If warranted right upper quadrant ultrasound can be obtained.    Bladder wall thickening with perivesicular stranding suggestive of   cystitis.  Bilateral nonspecific perinephric stranding may also be seen in the   setting of UTI.  No obstructive uropathy.    Small rectal fecal impaction with associated mild stercoral proctitis    Diverticulosis coli without acute diverticulitis      < end of copied text >  
  Patient is a 78y Male whom presented to the hospital with mally     PAST MEDICAL & SURGICAL HISTORY:  JILLIAN on CPAP      Myocardial infarction  ,2006      BPH (benign prostatic hyperplasia)      Memory loss      Other secondary osteoarthritis of left knee      CAD (coronary artery disease)      Hypothyroid      Essential hypertension      HLD (hyperlipidemia)      Dementia      PAD (peripheral artery disease)      HTN (hypertension)      HLD (hyperlipidemia)      S/P arthroscopy of left knee        Coronary angioplasty status  PCI with stents  LAD,  2006 x 1      Status post arterial stent      History of left knee replacement          MEDICATIONS  (STANDING):  aspirin  chewable 81 milliGRAM(s) Oral daily  atorvastatin 80 milliGRAM(s) Oral at bedtime  busPIRone 5 milliGRAM(s) Oral three times a day  clopidogrel Tablet 75 milliGRAM(s) Oral daily  escitalopram 7.5 milliGRAM(s) Oral daily  heparin   Injectable 5000 Unit(s) SubCutaneous every 12 hours  lactobacillus acidophilus 1 Tablet(s) Oral every 12 hours  levothyroxine 75 MICROGram(s) Oral daily  melatonin 5 milliGRAM(s) Oral at bedtime  memantine 10 milliGRAM(s) Oral two times a day  mesalamine Suppository 1000 milliGRAM(s) Rectal at bedtime  pantoprazole    Tablet 40 milliGRAM(s) Oral before breakfast  polyethylene glycol 3350 17 Gram(s) Oral two times a day  senna 2 Tablet(s) Oral at bedtime  sodium chloride 0.9%. 1000 milliLiter(s) (50 mL/Hr) IV Continuous <Continuous>  tamsulosin 0.4 milliGRAM(s) Oral at bedtime      Allergies    penicillin (Hives; Urticaria)  penicillin (Swelling; Rash; Hives)    Intolerances        SOCIAL HISTORY:  Denies ETOh,Smoking,     FAMILY HISTORY:  Family history of lung cancer (Mother)  mother          REVIEW OF SYSTEMS:    unable to obtained a good review system      VITAL:  T(C): , Max: 37.1 (24 @ 09:45)  T(F): , Max: 98.7 (24 @ 09:45)  HR: 109 (24 @ 13:28)  BP: 149/78 (24 @ 13:28)  BP(mean): --  RR: 18 (24 @ 13:28)  SpO2: 95% (24 @ 09:45)  Wt(kg): --    I and O's:    Height (cm): 177.8 ( @ 09:45)  Weight (kg): 86.2 ( @ 09:45)  BMI (kg/m2): 27.3 ( @ 09:45)  BSA (m2): 2.04 ( @ 09:45)    PHYSICAL EXAM:    Constitutional: NAD  HEENT: conjunctive   clear   Neck:  No JVD  Respiratory: CTAB  Cardiovascular: S1 and S2  Gastrointestinal: BS+, soft, NT/ND  Extremities: No peripheral edema    LABS:                        12.4   20.43 )-----------( 93       ( 24 May 2024 10:30 )             36.7         142  |  112<H>  |  45<H>  ----------------------------<  89  4.1   |  26  |  1.40<H>    Ca    8.8      24 May 2024 10:30    TPro  6.0  /  Alb  2.4<L>  /  TBili  0.5  /  DBili  x   /  AST  70<H>  /  ALT  38  /  AlkPhos  110        Urine Studies:  Urinalysis Basic - ( 24 May 2024 12:20 )    Color: Yellow / Appearance: Turbid / S.012 / pH: x  Gluc: x / Ketone: Negative mg/dL  / Bili: Negative / Urobili: 1.0 mg/dL   Blood: x / Protein: 100 mg/dL / Nitrite: Negative   Leuk Esterase: Large / RBC: >50 /HPF / WBC >50 /HPF   Sq Epi: x / Non Sq Epi: x / Bacteria: Moderate /HPF            RADIOLOGY & ADDITIONAL STUDIES:                
[facility]  Walter Eubanks PA-C  [address] [place] [address]  [phone]      Chief Complaint:  Patient is a 78y old  Male who presents with a chief complaint of leukocytosis    HPI:  77 yo male with PMH of hypothyroidism, hld, atherosclerotic heart disease, Meier's Palsy, MDD, Alzheimer's disease, BPH, and peripheral vascular disease who was brought in from [facility] for elevated WBC and BUN/Cr levels. The patient has been diagnosed with UTI the past three days and has been given Rocephin for tx which has not improved his WBC. Per patient chart, WBC and BUN/Cr levels were 22.65 and 40/2.84 on  and 23.12 and 52/2.2 on . The patient is unable to verbalize any active complaints at this time, his wife at bedside reports that he seems weaker and to be ambulating less often compared to his baseline.    INTERVAL HPI:  Pt s/e in emergency department with wife at bedside  Pt unable to provide meaningful history due to baseline mental status. Discussed history with pt's wife as above.  Pt's wife denies noticing recent constipation. Also denies any known abdominal pain, but it is difficult to obtain hx from pt due to his baseline mental status.    Allergies:  penicillin (Hives; Urticaria)  penicillin (Swelling; Rash; Hives)      Medications:  acetaminophen     Tablet .. 650 milliGRAM(s) Oral every 6 hours PRN  aluminum hydroxide/magnesium hydroxide/simethicone Suspension 30 milliLiter(s) Oral every 4 hours PRN  aspirin  chewable 81 milliGRAM(s) Oral daily  atorvastatin 80 milliGRAM(s) Oral at bedtime  bisacodyl Suppository 10 milliGRAM(s) Rectal at bedtime  busPIRone 5 milliGRAM(s) Oral three times a day  clopidogrel Tablet 75 milliGRAM(s) Oral daily  escitalopram 7.5 milliGRAM(s) Oral daily  lactobacillus acidophilus 1 Tablet(s) Oral every 12 hours  levothyroxine 75 MICROGram(s) Oral daily  magnesium hydroxide Suspension 30 milliLiter(s) Oral daily PRN  melatonin 5 milliGRAM(s) Oral at bedtime  memantine 10 milliGRAM(s) Oral two times a day  ondansetron Injectable 4 milliGRAM(s) IV Push every 8 hours PRN  polyethylene glycol 3350 17 Gram(s) Oral two times a day  senna 2 Tablet(s) Oral at bedtime  sodium chloride 0.9%. 1000 milliLiter(s) IV Continuous <Continuous>  tamsulosin 0.4 milliGRAM(s) Oral at bedtime      PMHX/PSHX:  JILLIAN on CPAP    Myocardial infarction    BPH (benign prostatic hyperplasia)    Memory loss    Other secondary osteoarthritis of left knee    CAD (coronary artery disease)    Hypothyroid    Essential hypertension    HLD (hyperlipidemia)    Dementia    PAD (peripheral artery disease)    HTN (hypertension)    HLD (hyperlipidemia)    S/P arthroscopy of left knee    Coronary angioplasty status    Status post arterial stent    History of left knee replacement        Family history:  Family history of lung cancer (Mother)    No pertinent family history in first degree relatives      ROS:   Unable to obtain due to pt's baseline mental status    PHYSICAL EXAM:   Vital Signs:  Vital Signs Last 24 Hrs  T(C): 36.7 (24 May 2024 13:28), Max: 37.1 (24 May 2024 09:45)  T(F): 98.1 (24 May 2024 13:28), Max: 98.7 (24 May 2024 09:45)  HR: 109 (24 May 2024 13:28) (74 - 109)  BP: 149/78 (24 May 2024 13:28) (109/65 - 149/78)  BP(mean): --  RR: 18 (24 May 2024 13:28) (18 - 18)  SpO2: 95% (24 May 2024 09:45) (95% - 95%)    Parameters below as of 24 May 2024 13:28  Patient On (Oxygen Delivery Method): room air      Daily Height in cm: 177.8 (24 May 2024 09:45)    Daily     GENERAL:  Appears stated age,   HEENT:  NC/AT,    CHEST:  Full & symmetric excursion,   HEART:  Regular rhythm  ABDOMEN:  Soft, non-tender, non-distended,   EXTEREMITIES:  no cyanosis,clubbing or edema  SKIN:  No rash  NEURO:  Confused at baseline    LABS:                        12.4   20.43 )-----------( 93       ( 24 May 2024 10:30 )             36.7     05-    142  |  112<H>  |  45<H>  ----------------------------<  89  4.1   |  26  |  1.40<H>    Ca    8.8      24 May 2024 10:30    TPro  6.0  /  Alb  2.4<L>  /  TBili  0.5  /  DBili  x   /  AST  70<H>  /  ALT  38  /  AlkPhos  110      LIVER FUNCTIONS - ( 24 May 2024 10:30 )  Alb: 2.4 g/dL / Pro: 6.0 g/dL / ALK PHOS: 110 U/L / ALT: 38 U/L / AST: 70 U/L / GGT: x           PT/INR - ( 24 May 2024 10:30 )   PT: 10.4 sec;   INR: 0.89 ratio         PTT - ( 24 May 2024 10:30 )  PTT:27.8 sec  Urinalysis Basic - ( 24 May 2024 12:20 )    Color: Yellow / Appearance: Turbid / S.012 / pH: x  Gluc: x / Ketone: Negative mg/dL  / Bili: Negative / Urobili: 1.0 mg/dL   Blood: x / Protein: 100 mg/dL / Nitrite: Negative   Leuk Esterase: Large / RBC: >50 /HPF / WBC >50 /HPF   Sq Epi: x / Non Sq Epi: x / Bacteria: Moderate /HPF          Imaging:    < from: CT Abdomen and Pelvis No Cont (24 @ 11:14) >    ACC: [id number] EXAM:  CT ABDOMEN AND PELVIS   ORDERED BY: LUCAS SWANSON     ACC: [id number] EXAM:  CT CHEST   ORDERED BY: LUCAS SWANSON     PROCEDURE DATE:  2024          INTERPRETATION:  CLINICAL INFORMATION: Elevated white count and creatinine    COMPARISON: None.    CONTRAST/COMPLICATIONS:  IV Contrast: NONE  Oral Contrast: NONE  Complications: None reported at time of study completion    PROCEDURE:  CT of the Chest, Abdomen and Pelvis was performed.  Sagittal and coronal reformats were performed.    FINDINGS:  Limited by lack of any exogenous oral or intravenous contrast as well as   beam hardening artifact.  CHEST:  LUNGS AND LARGE AIRWAYS: Patent central airways. Trace bibasilar   dependent pleural effusions with dependent atelectatic changes.  PLEURA: As above  VESSELS: Normal caliber.  HEART: Heart size is normal. Coronary artery calcification. No   pericardial effusion.  MEDIASTINUM AND ADRIANE: No lymphadenopathy.  CHEST WALL AND LOWER NECK: Within normal limits.    ABDOMEN AND PELVIS:  LIVER: Within normal limits.  BILE DUCTS: Normal caliber.  GALLBLADDER: Cholelithiasis with marked gallbladder distention. Correlate   with symptomatology. If warranted right upper quadrant ultrasound can be   obtained.  SPLEEN:Within normal limits.  PANCREAS: Within normal limits.  ADRENALS: Within normal limits.  KIDNEYS/URETERS: No hydronephrosis. Bilateral extrarenal pelvis.   Bilateral perinephric stranding is nonspecific can be seen in the setting   of UTI. Right renal cysts.    BLADDER: Decompressed with Cosme. Notwithstanding there is   circumferential bladder wall thickening with perivesicular stranding   compatible with cystitis  REPRODUCTIVE ORGANS: Enlarged prostate    BOWEL: No bowel obstruction. Colonic diverticulosis without   diverticulitis. Small rectal fecal impaction with mild circumferential   rectal wall thickening  perirectal edema consistent with acute proctitis.   Appendix no appendicitis  PERITONEUM: No ascites.  VESSELS: Nonaneurysmal.  RETROPERITONEUM/LYMPH NODES: No lymphadenopathy.  ABDOMINAL WALL: Dependent subcutaneous edema hip regions.  BONES: Degenerative changes. Grade 1 anterolisthesis L4 on L5.    IMPRESSION:  Limited by lack of any exogenous contrast as well as beam hardening   artifact.    Trace bibasilar pleural effusions with dependent atelectasis. No focal   consolidation.    Cholelithiasis with distended gallbladder. Correlate with symptomatology.   If warranted right upper quadrant ultrasound can be obtained.    Bladder wall thickening with perivesicular stranding suggestive of   cystitis.  Bilateral nonspecific perinephric stranding may also be seen in the   setting of UTI.  No obstructive uropathy.    Small rectal fecal impaction with associated mild stercoral proctitis    Diverticulosis coli without acute diverticulitis    --- End of Report ---            SABRINA POPE MD; Attending Radiologist  This document has been electronically signed. May 24 2024 11:38AM    < end of copied text >        
     CHIEF COMPLAINT/ REASON FOR VISIT  .. Patient was seen to address the  issue listed under PROBLEM LIST which is located toward bottom of this note     CARLITOS MASTER    PLV APER 01    Allergies    penicillin (Hives; Urticaria)  penicillin (Swelling; Rash; Hives)    Intolerances        PAST MEDICAL & SURGICAL HISTORY:  JILLIAN on CPAP      Myocardial infarction  ,      BPH (benign prostatic hyperplasia)      Memory loss      Other secondary osteoarthritis of left knee      CAD (coronary artery disease)      Hypothyroid      Essential hypertension      HLD (hyperlipidemia)      Dementia      PAD (peripheral artery disease)      HTN (hypertension)      HLD (hyperlipidemia)      S/P arthroscopy of left knee        Coronary angioplasty status  PCI with stents  LAD,  2006 x 1      Status post arterial stent      History of left knee replacement          FAMILY HISTORY:  Family history of lung cancer (Mother)  mother        Home Medications:  Aquaphor topical ointment: Apply topically to affected area 2 times a day (24 May 2024 12:31)  aspirin 81 mg oral tablet, chewable: 1 tab(s) chewed once a day (24 May 2024 15:20)  Bacid oral capsule: 1 cap(s) orally 2 times a day until 24 (24 May 2024 15:20)  busPIRone 5 mg oral tablet: 1 tab(s) orally 3 times a day (24 May 2024 15:20)  cefTRIAXone 1 g injection: 1 gram(s) intravenously 2 times a day for prophylaxis until 24 (started 5/23 AM) (24 May 2024 15:20)  clopidogrel 75 mg oral tablet: 1 tab(s) orally once a day (24 May 2024 15:20)  Dulcolax Laxative 10 mg rectal suppository: 1 suppository(ies) rectally as needed for  constipation (24 May 2024 15:20)  escitalopram 5 mg oral tablet: 1.5 tab(s) orally once a day (24 May 2024 15:20)  Fleet Enema 19 g-7 g rectal enema: 133 milliliter(s) rectally as needed for  constipation (24 May 2024 15:20)  levothyroxine 75 mcg (0.075 mg) oral tablet: 1 tab(s) orally once a day (24 May 2024 15:20)  melatonin 3 mg oral tablet: 2 tab(s) orally once a day (at bedtime) (24 May 2024 15:20)  memantine 10 mg oral tablet: 1 tab(s) orally 2 times a day (24 May 2024 15:20)  Milk of Magnesia 1200 mg/15 mL oral liquid: 30 milliliter(s) orally once a day as needed for  constipation (24 May 2024 15:20)  rosuvastatin 20 mg oral tablet: 1 tab(s) orally once a day (at bedtime) (24 May 2024 15:20)  tamsulosin 0.4 mg oral capsule: 1 cap(s) orally once a day (in the evening) (24 May 2024 15:20)  Tylenol 500 mg oral tablet: 2 tab(s) orally 3 times a day (24 May 2024 15:20)      MEDICATIONS  (STANDING):  aspirin  chewable 81 milliGRAM(s) Oral daily  atorvastatin 80 milliGRAM(s) Oral at bedtime  busPIRone 5 milliGRAM(s) Oral three times a day  clopidogrel Tablet 75 milliGRAM(s) Oral daily  escitalopram 7.5 milliGRAM(s) Oral daily  heparin   Injectable 5000 Unit(s) SubCutaneous every 12 hours  lactobacillus acidophilus 1 Tablet(s) Oral every 12 hours  levothyroxine 75 MICROGram(s) Oral daily  melatonin 5 milliGRAM(s) Oral at bedtime  memantine 10 milliGRAM(s) Oral two times a day  mesalamine Suppository 1000 milliGRAM(s) Rectal at bedtime  pantoprazole    Tablet 40 milliGRAM(s) Oral before breakfast  polyethylene glycol 3350 17 Gram(s) Oral two times a day  senna 2 Tablet(s) Oral at bedtime  sodium chloride 0.9%. 1000 milliLiter(s) (50 mL/Hr) IV Continuous <Continuous>  tamsulosin 0.4 milliGRAM(s) Oral at bedtime    MEDICATIONS  (PRN):  acetaminophen     Tablet .. 650 milliGRAM(s) Oral every 6 hours PRN Temp greater or equal to 38C (100.4F), Mild Pain (1 - 3)  aluminum hydroxide/magnesium hydroxide/simethicone Suspension 30 milliLiter(s) Oral every 4 hours PRN Dyspepsia  bisacodyl Suppository 10 milliGRAM(s) Rectal daily PRN Constipation  magnesium hydroxide Suspension 30 milliLiter(s) Oral daily PRN Constipation  ondansetron Injectable 4 milliGRAM(s) IV Push every 8 hours PRN Nausea and/or Vomiting      Diet, NPO:   Except Medications (24 @ 14:22) [Active]          Vital Signs Last 24 Hrs  T(C): 37.8 (24 May 2024 18:48), Max: 37.8 (24 May 2024 18:48)  T(F): 100.1 (24 May 2024 18:48), Max: 100.1 (24 May 2024 18:48)  HR: 76 (24 May 2024 18:48) (74 - 109)  BP: 113/69 (24 May 2024 18:48) (109/65 - 149/78)  BP(mean): --  RR: 18 (24 May 2024 18:48) (18 - 18)  SpO2: 96% (24 May 2024 18:48) (95% - 96%)    Parameters below as of 24 May 2024 13:28  Patient On (Oxygen Delivery Method): room air                  LABS:                        12.4   20.43 )-----------( 93       ( 24 May 2024 10:30 )             36.7         142  |  112<H>  |  45<H>  ----------------------------<  89  4.1   |  26  |  1.40<H>    Ca    8.8      24 May 2024 10:30    TPro  6.0  /  Alb  2.4<L>  /  TBili  0.5  /  DBili  x   /  AST  70<H>  /  ALT  38  /  AlkPhos  110      PT/INR - ( 24 May 2024 10:30 )   PT: 10.4 sec;   INR: 0.89 ratio         PTT - ( 24 May 2024 10:30 )  PTT:27.8 sec  Urinalysis Basic - ( 24 May 2024 12:20 )    Color: Yellow / Appearance: Turbid / S.012 / pH: x  Gluc: x / Ketone: Negative mg/dL  / Bili: Negative / Urobili: 1.0 mg/dL   Blood: x / Protein: 100 mg/dL / Nitrite: Negative   Leuk Esterase: Large / RBC: >50 /HPF / WBC >50 /HPF   Sq Epi: x / Non Sq Epi: x / Bacteria: Moderate /HPF            WBC:  WBC Count: 20.43 K/uL ( @ 10:30)      MICROBIOLOGY:  RECENT CULTURES:              PT/INR - ( 24 May 2024 10:30 )   PT: 10.4 sec;   INR: 0.89 ratio         PTT - ( 24 May 2024 10:30 )  PTT:27.8 sec    Sodium:  Sodium: 142 mmol/L ( @ 10:30)      1.40 mg/dL  @ 10:30      Hemoglobin:  Hemoglobin: 12.4 g/dL ( @ 10:30)      Platelets: Platelet Count - Automated: 93 K/uL ( @ 10:30)      LIVER FUNCTIONS - ( 24 May 2024 10:30 )  Alb: 2.4 g/dL / Pro: 6.0 g/dL / ALK PHOS: 110 U/L / ALT: 38 U/L / AST: 70 U/L / GGT: x             Urinalysis Basic - ( 24 May 2024 12:20 )    Color: Yellow / Appearance: Turbid / S.012 / pH: x  Gluc: x / Ketone: Negative mg/dL  / Bili: Negative / Urobili: 1.0 mg/dL   Blood: x / Protein: 100 mg/dL / Nitrite: Negative   Leuk Esterase: Large / RBC: >50 /HPF / WBC >50 /HPF   Sq Epi: x / Non Sq Epi: x / Bacteria: Moderate /HPF        RADIOLOGY & ADDITIONAL STUDIES:      MICROBIOLOGY:  RECENT CULTURES:

## 2024-05-24 NOTE — ED PROVIDER NOTE - CLINICAL SUMMARY MEDICAL DECISION MAKING FREE TEXT BOX
78-year-old male with elevated white count and creatinine, will send CBC, CMP, coagulation studies, lactate level, blood cultures, urinalysis, urine culture, IV fluids, CT chest, CT abdomen and pelvis, start antibiotics and admit.

## 2024-05-24 NOTE — ED ADULT NURSE REASSESSMENT NOTE - NS ED NURSE REASSESS COMMENT FT1
report given to karine faria in [facility]. pending transportation in stable condition. NAD.

## 2024-05-24 NOTE — ED ADULT TRIAGE NOTE - CHIEF COMPLAINT QUOTE
Pt BIBA from [facility] for continuing elevated WBC d/t UTI- despite antibiotics and today noted to have increased BUN and Cr. Hx dementia, DNR/DNI with limited interventions as per facility paperwork.

## 2024-05-24 NOTE — H&P ADULT - PROBLEM SELECTOR PLAN 6
Stercoral colitis  Fecal retention  Cholelithiasis  CT noted,  d/w wife  Bowel regimen  Mesalamine suppository qhs  Monitor for BM  LFTs grossly within normal limits; trend  Follow up RUQ US

## 2024-05-24 NOTE — H&P ADULT - ASSESSMENT
Chi Deleon is a 77 yo male with PMH of hypothyroidism, hld, atherosclerotic heart disease, Meier's Palsy, MDD, Alzheimer's disease, BPH, and peripheral vascular disease who was brought in from [facility] for elevated WBC and BUN/Cr levels. The patient has been diagnosed with UTI the past three days and has been given Rocephin for tx which has not improved his WBC. Per patient chart, WBC and BUN/Cr levels were 22.65 and 40/2.84 on 5/22 and 23.12 and 52/2.2 on 5/23. The patient is unable to verbalize any active complaints at this time, his wife at bedside reports that he seems weaker and to be ambulating less often compared to his baseline. CT Chest No Cont was done and showed   Trace bibasilar pleural effusions with dependent atelectasis. No focal   consolidation.  Cholelithiasis with distended gallbladder. Correlate with symptomatology.   If warranted right upper quadrant ultrasound can be obtained.  Bladder wall thickening with perivesicular stranding suggestive of   cystitis.  Bilateral nonspecific perinephric stranding may also be seen in the   setting of UTI.  No obstructive uropathy.  Small rectal fecal impaction with associated mild stercoral proctitis  Admitted for septic workup ,iv hydration ,iv abx

## 2024-05-24 NOTE — H&P ADULT - PROBLEM SELECTOR PLAN 2
2/2 to UTI and CAROLA , POA - as per med staff from [facility] renal indices are improving but WBC remains high

## 2024-05-24 NOTE — ED ADULT NURSE REASSESSMENT NOTE - NS ED NURSE REASSESS COMMENT FT1
Patient awake during this round with family at bedside provided with chair at this time waiting for bed assignment

## 2024-05-24 NOTE — ED ADULT NURSE NOTE - CHIEF COMPLAINT QUOTE
Pt BIBA from [facility] for continuing elevated WBC d/t UTI- despite antibiotics and today noted to have increased BUN and Cr. Hx dementia, DNR/DNI with limited interventions as per facility paperwork.

## 2024-05-24 NOTE — ED PROVIDER NOTE - NSICDXPASTMEDICALHX_GEN_ALL_CORE_FT
PAST MEDICAL HISTORY:  BPH (benign prostatic hyperplasia)     CAD (coronary artery disease)     Dementia     Essential hypertension     HLD (hyperlipidemia)     HLD (hyperlipidemia)     HTN (hypertension)     Hypothyroid     Memory loss     Myocardial infarction 2002,2006    JILLIAN on CPAP     Other secondary osteoarthritis of left knee     PAD (peripheral artery disease)

## 2024-05-24 NOTE — CONSULT NOTE ADULT - ASSESSMENT
Chi Deleon is a 77 yo male with PMH of hypothyroidism, hld, atherosclerotic heart disease, Meier's Palsy, MDD, Alzheimer's disease, BPH, and peripheral vascular disease who was brought in from [facility] for elevated WBC and BUN/Cr levels. The patient has been diagnosed with UTI the past three days and has been given Rocephin for tx which has not improved his WBC. Per patient chart, WBC and BUN/Cr levels were 22.65 and 40/2.84 on 5/22 and 23.12 and 52/2.2 on 5/23. The patient is unable to verbalize any active complaints at this time, his wife at bedside reports that he seems weaker and to be ambulating less often compared to his baseline. CT Chest No Cont was done and showed Trace bibasilar pleural effusions with dependent atelectasis. No focal consolidation.Cholelithiasis with distended gallbladder. Correlate with symptomatology. If warranted right upper quadrant ultrasound can be obtained.Bladder wall thickening with perivesicular stranding suggestive of cystitis.Bilateral nonspecific perinephric stranding may also be seen in the setting of UTI.No obstructive uropathy.Small rectal fecal impaction with associated mild stercoral proctitis Admitted for septic workup ,iv hydration ,iv abx     ACUTE RENAL FAILURE:   Serum creatinine is  at  1.4    , approximating GFR at   ml/min.   There is no progression . No uremic symptoms  No evidence of anemia .  Fluid status stable.  Will continue to avoid nephrotoxic drugs.  Patient remains asymptomatic.   Continue current therapy.  hold  diuretic.  hold   ACE inhibitor.  hold   ARB.  Additional evaluation:   ECG,    echocardiogram,     CXR,  will obtained recent   renal ultrasound to evalaute kidney size and possible stones ,      Admit for septic workup and ID evaluation,send blood and urine cx,serial lactate levels,monitor vitals closley,ivfs hydration,monitor urine output and renal profile,iv abx as per id cons      BP monitoring,continue current antihypertensive meds, low salt diet,followup with PMD in 1-2 weeks    
77 yo male with PMH of hypothyroidism, hld, atherosclerotic heart disease, Meier's Palsy, MDD, Alzheimer's disease, BPH, and peripheral vascular disease, who was brought in from [facility] for elevated WBC and BUN/Cr levels. Recently diagnosed with UTI the past three days and has been given Rocephin for tx which has not improved his WBC.    WBC 20 here, no documented fever. CT showed bladder wall thickening with perivesicular stranding suggestive of cystitis, and bilateral nonspecific perinephric stranding which may also be seen in the setting of UTI. CT also showed cholelithiasis with distended gallbladder.    #Leukocytosis  #UTI  #Cholelithiasis  #Hx of penicillin allergy    -continue ertapenem  -follow blood and urine cultures  -suggest RUQ US  -monitor WBC, LFT's    Thank you for courtesy of this consult.     Will follow.  Discussed with the primary team.     Akua Hernandez MD  Division of Infectious Diseases   Cell [phone] between 8am and 6pm   After 6pm and weekends please call ID service at [phone].     55 minutes spent on total encounter assessing patient, examination, chart review, counseling and coordinating care by the attending physician/nurse/care manager.   
Stercoral colitis  Fecal retention  Cholelithiasis    CT noted,  d/w wife  Bowel regimen  Mesalamine suppository qhs  Monitor for BM  LFTs grossly within normal limits; trend  Follow up RUQ US  D/w pt and wife at bedside    I reviewed the overnight course of events on the unit, re-confirming the patient history. I discussed the care with the patient  Differential diagnosis and plan of care discussed with patient after the evaluation  35 minutes spent on total encounter of which more than fifty percent of the encounter was spent counseling and/or coordinating care by the attending physician.  
   Initial evaluation/Pulmonary Critical Care consultation requested by  DR ELY on 5/24/2024 from Dr Axel Comer    Patient examined chart reviewed    HOSPITAL ADMISSION   PATIENT CAME  FROM (if information available)      REASON FOR VISIT  .. Management of problems listed below      ROS  . Patient unable to give ROS     PHYSICAL EXAM    HEENT Unremarkable  atraumatic   RESP Fair air entry  Harsh breath sound   CARDIAC S1 S2 No S3     NO JVD    ABDOMEN No hepatosplenomegaly   PEDAL EDEMA present No calf tenderness  NO rash       GENERAL DATA .   GOC.    ..  prior   ICU STAY.    .. no   COVID.   .. 5/24/2024 scv2 (-)   BEST TRACTICE ISSUES.  . HOB ELEVATN.  .. Yes  . DVT PPLX.  5/24/2024 HPSC   . STRESS ULCER PPLX. 5/24/2024 PROTONIX 40   . DIET.  5/24/2024 NPO   . IV FLUIDS...... 5/24/2024 NS 50     ALLGY.   .. pncl      WT.   ..  5/24/2024 86  BMI.   .. 5/24/2024 27       ABGS.   .  VS/ PO/IO/ VENT/ DRIPS.   5/24/2024 afeb 109 140/70   5/24/2024 ra 95%      . CC .   . 5/24/2024 BIBA from [facility] for continuing elevated WBC d/t UTI- despite antibiotics and today noted to have increased BUN and Cr. Hx dementia, DNR/DNI with limited interventions as per facility paperwork.    PATIENT DATA.  RESP.   INFECTION.  …. Esr 5/24/2024 esr 37   …. W 5/24/2024 w 20   .... ua 5/24/2024 ua w 50 r 50 bact moder   .... pr 5/24/2024 pr 19   .... ct cap 5/24/2024   ........ Tr bl effsns with dependent atelectasis   ........ cholelithiasis and distended gb   ........ bladder wall thickening and perivesicular stranding   ........ small rectal fecal impaction with mild stercoral proctitis   .... rvp 5/24/2024 (-)   ..... 5/24/2024 ERTAPENEM X 3D   CARDIAC.  .... 5/24/2024 ASA 81   .... 5/24/2024 PLAVX 75  .... 5/24/2024 ATORVASTAT 80    HEMAT.  …. Hb 5/24/2024 Hb 12.4   .... Plt 5/24/2024 Plt 93   .... INR 5/24/2024 .89   GI.  .... LFTS 5/24/2024   ........   ........ AST 70  ........ ALT 38   ..... 5/24/2024 MIRALAX   .... 5/24/2024 MESCALAMIN [id number]   RENAL.  .... Na 5/24/2024 Na 142   .... K 5/24/2024 K 4.1   .... CO2 5/24/2024 co2 26  .... Cr 5/24/2024 Cr 1.4   .... 5/24/2024 TAMSULOSIN .4   ENDO.  .... 5/24/2024 LEVOXYL 75   NEURO.  .... 5/24/2024 BUSPAR 5.3   .... 5/24/2024 LEXAPRO 75   .... 5/24/2024 MEMANTINE 10.2   SKIN.    . BRIEFLY, .      . 5/24/2024 77 yo male with PMH of hypothyroidism, hld, atherosclerotic heart disease, Meier's Palsy, MDD, Alzheimer's disease, BPH, and peripheral vascular disease who was brought in from [facility] for elevated WBC and BUN/Cr levels. The patient has been diagnosed with UTI the past three days and has been given Rocephin for tx which has not improved his WBC. Per patient chart, WBC and BUN/Cr levels were 22.65 and 40/2.84 on 5/22 and 23.12 and 52/2.2 on 5/23. The patient is unable to verbalize any active complaints at this time, his wife at bedside reports that he seems weaker and to be ambulating less often compared to his baseline.     . COURSE   .... 5/24/2024  Started on ertapenem for presumed uti     . VISIT HISTORY .  PAST MEDICAL HISTORY:  CAD (coronary artery disease)   HLD (hyperlipidemia)   HTN (hypertension)   Hypothyroid   Dementia  Memory loss   Myocardial infarction 2002,2006  JILLIAN on CPAP   Other secondary osteoarthritis of left knee   PAD (peripheral artery disease).   BPH (benign prostatic hyperplasia)     PAST SURGICAL HISTORY:  Coronary angioplasty status PCI with stents 2002 LAD,  2006 x 1  History of left knee replacement   S/P arthroscopy of left knee 2014  Status post arterial stent.  . SIGNIFICANT HOME MEDS.   · aspirin 81 mg oral tablet, chewable: 1 tab(s) orally once a day  · pantoprazole 40 mg oral delayed release tablet: 1 tab(s) orally once a day (before a meal)  · senna oral tablet: 2 tab(s) orally once a day (at bedtime)  · docusate sodium 100 mg oral capsule: 1 cap(s) orally 3 times a day  · aspirin 81 mg oral delayed release tablet: 1 tab(s) orally 2 times a day x 40 more days  · tamsulosin 0.4 mg oral capsule: 1 cap(s) orally once a day (at bedtime)  · oxyCODONE 10 mg oral tablet: 1 tab(s) orally every 3 hours, As needed, Moderate Pain (4 - 6)  · oxyCODONE 5 mg oral tablet: 1 tab(s) orally every 3 hours, As needed, Mild Pain (1 - 3)  · acetaminophen 500 mg oral tablet: 2 tab(s) orally every 12 hours  · Rapaflo 8 mg oral capsule: 1 cap(s) orally once a day (at bedtime)  · memantine 10 mg oral tablet: 1 tab(s) orally once a day  · rosuvastatin 20 mg oral capsule: 1 tab(s) orally once a day  · Detrol: 4 milligram(s) orally once a day  · Aricept 10 mg oral tablet: 1 tab(s) orally once a day (at bedtime)  · Multi Vitamin+: 1 tab(s) orally  · Fish Oil oral capsule: 1 tab(s) orally once a day  · Vitamin B12 50 mcg oral tablet: 1 tab(s) orally once a day  · atenolol 25 mg oral tablet: 1 tab(s) orally once a day  · memantine 5 mg oral tablet: 1 tab(s) orally once a day (at bedtime)  · memantine 10 mg oral tablet: orally 2 times a day  · Crestor 20 mg oral tablet: 1 tab(s) orally once a day (at bedtime)  · escitalopram 5 mg oral tablet: 0.5 tab(s) orally once a day (in the morning)  · Plavix 75 mg oral tablet: 1 tab(s) orally once a day  · clopidogrel 75 mg oral tablet: 1 tab(s) orally once a day  · Fleet Enema 19 g-7 g rectal enema: 133 milliliter(s) rectally once a day as needed for  constipation  · tamsulosin 0.4 mg oral capsule: 1 cap(s) orally once a day  · bisacodyl 10 mg rectal suppository: 1 suppository(ies) rectally once a day as needed for  constipation  · levothyroxine 75 mcg (0.075 mg) oral tablet: 1 tab(s) orally once a day  · levothyroxine 75 mcg (0.075 mg) oral tablet: 1 tab(s) orally once a day    . DEVICES/TUBES/DECUBS/PROCEDURES.    . PROBLEM/PLAN.    . SEPSIS 5/24/2024   . UTI 5/24/2024   . MILD STERCORAL PROCTITIS 5/24/2024 CT   …. Esr 5/24/2024 esr 37   …. W 5/24/2024 w 20   .... ua 5/24/2024 ua w 50 r 50 bact moder   .... pr 5/24/2024 pr 19   .... ct cap 5/24/2024   ........ Tr bl effsns with dependent atelectasis   ........ cholelithiasis and distended gb   ........ bladder wall thickening and perivesicular stranding   ........ small rectal fecal impaction with mild stercoral proctitis   .... rvp 5/24/2024 (-)   ..... 5/24/2024 ERTAPENEM X 3D     . ATELECTASIS 5/24/2024 CT     . CAD.   .... 5/24/2024 ASA 81   .... 5/24/2024 PLAVX 75     . THROMBOCYTOPENIA 5/24/2024 [PLT 93   .... monitor     . RECTAL FECAL IMPACTN 5/24/2024 CT   .... on laxativ    . ELEVATED LFTS 5/24/2024 ALT 70   .... monitor     . CAROLA 5/24/2024 CR 1.4  ..... Fluids monitor     . HYPOTHYROID.  . ALZHEIMERS     . CURRENT ISSUES.  . SEPSIS 5/24/2024   . UTI 5/24/2024   . MILD STERCORAL PROCTITIS 5/24/2024 CT   . ATELECTASIS 5/24/2024 CT   . TRACE BILATERAL PLEURAL EFFUSNS 5/24/2024 CT   . CAD.   . THROMBOCYTOPENIA 5/24/2024 [PLT 93   . RECTAL FECAL IMPACTN 5/24/2024 CT   . ELEVATED LFTS 5/24/2024 ALT 70   . CAROLA 5/24/2024 CR 1.4  . HYPOTHYROID.  . ALZHEIMERS     TIME SPENT.  . Over 55 minutes aggregate care time spent on encounter; activities included   direct patient care, counseling and/or coordinating care reviewing notes, lab data/ imaging , discussion with multidisciplinary team/ patient  /family and explaining in detail risks, benefits, alternatives  of the recommendations     PATIENT.  . MASTER CARLITOS 78 m 5/24/2024 1947 DR SINA ELY

## 2024-05-24 NOTE — ED PROVIDER NOTE - NSICDXPASTSURGICALHX_GEN_ALL_CORE_FT
PAST SURGICAL HISTORY:  Coronary angioplasty status PCI with stents 2002 LAD,  2006 x 1    History of left knee replacement     S/P arthroscopy of left knee 2014    Status post arterial stent

## 2024-05-24 NOTE — H&P ADULT - NSHPLABSRESULTS_GEN_ALL_CORE
< from: CT Chest No Cont (05.24.24 @ 11:17) >      FINDINGS:  Limited by lack of any exogenous oral or intravenous contrast as well as   beam hardening artifact.  CHEST:  LUNGS AND LARGE AIRWAYS: Patent central airways. Trace bibasilar   dependent pleural effusions with dependent atelectatic changes.  PLEURA: As above  VESSELS: Normal caliber.  HEART: Heart size is normal. Coronary artery calcification. No   pericardial effusion.  MEDIASTINUM AND ADRIANE: No lymphadenopathy.  CHEST WALL AND LOWER NECK: Within normal limits.    ABDOMEN AND PELVIS:  LIVER: Within normal limits.  BILE DUCTS: Normal caliber.  GALLBLADDER: Cholelithiasis with marked gallbladder distention. Correlate   with symptomatology. If warranted right upper quadrant ultrasound can be   obtained.  SPLEEN:Within normal limits.  PANCREAS: Within normal limits.  ADRENALS: Within normal limits.  KIDNEYS/URETERS: No hydronephrosis. Bilateral extrarenal pelvis.   Bilateral perinephric stranding is nonspecific can be seen in the setting   of UTI. Right renal cysts.    BLADDER: Decompressed with Cosme. Notwithstanding there is   circumferential bladder wall thickening with perivesicular stranding   compatible with cystitis  REPRODUCTIVE ORGANS: Enlarged prostate    BOWEL: No bowel obstruction. Colonic diverticulosis without   diverticulitis. Small rectal fecal impaction with mild circumferential   rectal wall thickening  perirectal edema consistent with acute proctitis.   Appendix no appendicitis  PERITONEUM: No ascites.  VESSELS: Nonaneurysmal.  RETROPERITONEUM/LYMPH NODES: No lymphadenopathy.  ABDOMINAL WALL: Dependent subcutaneous edema hip regions.  BONES: Degenerative changes. Grade 1 anterolisthesis L4 on L5.    IMPRESSION:  Limited by lack of any exogenous contrast as well as beam hardening   artifact.    Trace bibasilar pleural effusions with dependent atelectasis. No focal   consolidation.    Cholelithiasis with distended gallbladder. Correlate with symptomatology.   If warranted right upper quadrant ultrasound can be obtained.    Bladder wall thickening with perivesicular stranding suggestive of   cystitis.  Bilateral nonspecific perinephric stranding may also be seen in the   setting of UTI.  No obstructive uropathy.    Small rectal fecal impaction with associated mild stercoral proctitis    Diverticulosis coli without acute diverticulitis    < end of copied text >    < from: TTE Echo Complete w/ Contrast w/ Doppler (06.23.23 @ 11:56) >     1. Technically difficult study.   2. Endocardial visualization was enhanced with intravenous echo contrast.   3. Left ventricular ejection fraction, by visual estimation, is 60 to   65%.   4. Normal global left ventricular systolic function.   5. Normal left ventricular internal cavity size.   6. Normal right ventricular size and function.   7. Mild mitral annular calcification.   8. No evidence of mitral valve regurgitation.   9. Sclerotic aortic valve with normal opening.  10. There is no evidence of pericardial effusion.    < end of copied text > < from: CT Chest No Cont (05.24.24 @ 11:17) >  Limited by lack of any exogenous oral or intravenous contrast as well as   beam hardening artifact.  CHEST:  LUNGS AND LARGE AIRWAYS: Patent central airways. Trace bibasilar   dependent pleural effusions with dependent atelectatic changes.  PLEURA: As above  VESSELS: Normal caliber.  HEART: Heart size is normal. Coronary artery calcification. No   pericardial effusion.  MEDIASTINUM AND ADRIANE: No lymphadenopathy.  CHEST WALL AND LOWER NECK: Within normal limits.    ABDOMEN AND PELVIS:  LIVER: Within normal limits.  BILE DUCTS: Normal caliber.  GALLBLADDER: Cholelithiasis with marked gallbladder distention. Correlate   with symptomatology. If warranted right upper quadrant ultrasound can be   obtained.  SPLEEN:Within normal limits.  PANCREAS: Within normal limits.  ADRENALS: Within normal limits.  KIDNEYS/URETERS: No hydronephrosis. Bilateral extrarenal pelvis.   Bilateral perinephric stranding is nonspecific can be seen in the setting   of UTI. Right renal cysts.    BLADDER: Decompressed with Cosme. Notwithstanding there is   circumferential bladder wall thickening with perivesicular stranding   compatible with cystitis  REPRODUCTIVE ORGANS: Enlarged prostate    BOWEL: No bowel obstruction. Colonic diverticulosis without   diverticulitis. Small rectal fecal impaction with mild circumferential   rectal wall thickening  perirectal edema consistent with acute proctitis.   Appendix no appendicitis  PERITONEUM: No ascites.  VESSELS: Nonaneurysmal.  RETROPERITONEUM/LYMPH NODES: No lymphadenopathy.  ABDOMINAL WALL: Dependent subcutaneous edema hip regions.  BONES: Degenerative changes. Grade 1 anterolisthesis L4 on L5.    IMPRESSION:  Limited by lack of any exogenous contrast as well as beam hardening   artifact.    Trace bibasilar pleural effusions with dependent atelectasis. No focal   consolidation.    Cholelithiasis with distended gallbladder. Correlate with symptomatology.   If warranted right upper quadrant ultrasound can be obtained.    Bladder wall thickening with perivesicular stranding suggestive of   cystitis.  Bilateral nonspecific perinephric stranding may also be seen in the   setting of UTI.  No obstructive uropathy.    Small rectal fecal impaction with associated mild stercoral proctitis    Diverticulosis coli without acute diverticulitis    < end of copied text >    < from: TTE Echo Complete w/ Contrast w/ Doppler (06.23.23 @ 11:56) >     1. Technically difficult study.   2. Endocardial visualization was enhanced with intravenous echo contrast.   3. Left ventricular ejection fraction, by visual estimation, is 60 to   65%.   4. Normal global left ventricular systolic function.   5. Normal left ventricular internal cavity size.   6. Normal right ventricular size and function.   7. Mild mitral annular calcification.   8. No evidence of mitral valve regurgitation.   9. Sclerotic aortic valve with normal opening.  10. There is no evidence of pericardial effusion.    05/24 gallbladder is  pending

## 2024-05-24 NOTE — ED PROVIDER NOTE - OBJECTIVE STATEMENT
Chi Deleon is a 79 yo male with PMH of hypothyroidism, hld, atherosclerotic heart disease, Meier's Palsy, MDD, Alzheimer's disease, BPH, and peripheral vascular disease who was brought in from [facility] for elevated WBC and BUN/Cr levels. The patient has been diagnosed with UTI the past three days and has been given Rocephin for tx which has not improved his WBC. Per patient chart, WBC and BUN/Cr levels were 22.65 and 40/2.84 on 5/22 and 23.12 and 52/2.2 on 5/23. The patient is unable to verbalize any active complaints at this time, his wife at bedside reports that he seems weaker and to be ambulating less often compared to his baseline.

## 2024-05-25 LAB
ALBUMIN SERPL ELPH-MCNC: 2.2 G/DL — LOW (ref 3.3–5)
ALP SERPL-CCNC: 149 U/L — HIGH (ref 40–120)
ALT FLD-CCNC: 46 U/L — SIGNIFICANT CHANGE UP (ref 12–78)
ANION GAP SERPL CALC-SCNC: 4 MMOL/L — LOW (ref 5–17)
AST SERPL-CCNC: 66 U/L — HIGH (ref 15–37)
BASOPHILS # BLD AUTO: 0.04 K/UL — SIGNIFICANT CHANGE UP (ref 0–0.2)
BASOPHILS NFR BLD AUTO: 0.3 % — SIGNIFICANT CHANGE UP (ref 0–2)
BILIRUB SERPL-MCNC: 0.6 MG/DL — SIGNIFICANT CHANGE UP (ref 0.2–1.2)
BUN SERPL-MCNC: 33 MG/DL — HIGH (ref 7–23)
CALCIUM SERPL-MCNC: 8.3 MG/DL — LOW (ref 8.5–10.1)
CHLORIDE SERPL-SCNC: 115 MMOL/L — HIGH (ref 96–108)
CO2 SERPL-SCNC: 26 MMOL/L — SIGNIFICANT CHANGE UP (ref 22–31)
CREAT SERPL-MCNC: 1.1 MG/DL — SIGNIFICANT CHANGE UP (ref 0.5–1.3)
EGFR: 70 ML/MIN/1.73M2 — SIGNIFICANT CHANGE UP
EOSINOPHIL # BLD AUTO: 0.13 K/UL — SIGNIFICANT CHANGE UP (ref 0–0.5)
EOSINOPHIL NFR BLD AUTO: 1 % — SIGNIFICANT CHANGE UP (ref 0–6)
GLUCOSE SERPL-MCNC: 86 MG/DL — SIGNIFICANT CHANGE UP (ref 70–99)
HCT VFR BLD CALC: 33.7 % — LOW (ref 39–50)
HGB BLD-MCNC: 11.3 G/DL — LOW (ref 13–17)
IMM GRANULOCYTES NFR BLD AUTO: 0.3 % — SIGNIFICANT CHANGE UP (ref 0–0.9)
INR BLD: 1.04 RATIO — SIGNIFICANT CHANGE UP (ref 0.85–1.18)
LYMPHOCYTES # BLD AUTO: 1.39 K/UL — SIGNIFICANT CHANGE UP (ref 1–3.3)
LYMPHOCYTES # BLD AUTO: 11 % — LOW (ref 13–44)
MCHC RBC-ENTMCNC: 31.3 PG — SIGNIFICANT CHANGE UP (ref 27–34)
MCHC RBC-ENTMCNC: 33.5 GM/DL — SIGNIFICANT CHANGE UP (ref 32–36)
MCV RBC AUTO: 93.4 FL — SIGNIFICANT CHANGE UP (ref 80–100)
MONOCYTES # BLD AUTO: 0.77 K/UL — SIGNIFICANT CHANGE UP (ref 0–0.9)
MONOCYTES NFR BLD AUTO: 6.1 % — SIGNIFICANT CHANGE UP (ref 2–14)
NEUTROPHILS # BLD AUTO: 10.25 K/UL — HIGH (ref 1.8–7.4)
NEUTROPHILS NFR BLD AUTO: 81.3 % — HIGH (ref 43–77)
NRBC # BLD: 0 /100 WBCS — SIGNIFICANT CHANGE UP (ref 0–0)
PLATELET # BLD AUTO: 94 K/UL — LOW (ref 150–400)
POTASSIUM SERPL-MCNC: 3.7 MMOL/L — SIGNIFICANT CHANGE UP (ref 3.5–5.3)
POTASSIUM SERPL-SCNC: 3.7 MMOL/L — SIGNIFICANT CHANGE UP (ref 3.5–5.3)
PROCALCITONIN SERPL-MCNC: 8.2 NG/ML — HIGH
PROT SERPL-MCNC: 5.5 G/DL — LOW (ref 6–8.3)
PROTHROM AB SERPL-ACNC: 12.1 SEC — SIGNIFICANT CHANGE UP (ref 9.5–13)
RBC # BLD: 3.61 M/UL — LOW (ref 4.2–5.8)
RBC # FLD: 13.9 % — SIGNIFICANT CHANGE UP (ref 10.3–14.5)
SODIUM SERPL-SCNC: 145 MMOL/L — SIGNIFICANT CHANGE UP (ref 135–145)
TSH SERPL-MCNC: 4.4 UIU/ML — HIGH (ref 0.36–3.74)
WBC # BLD: 12.62 K/UL — HIGH (ref 3.8–10.5)
WBC # FLD AUTO: 12.62 K/UL — HIGH (ref 3.8–10.5)

## 2024-05-25 PROCEDURE — 93010 ELECTROCARDIOGRAM REPORT: CPT

## 2024-05-25 PROCEDURE — 99232 SBSQ HOSP IP/OBS MODERATE 35: CPT

## 2024-05-25 RX ADMIN — HEPARIN SODIUM 5000 UNIT(S): 5000 INJECTION INTRAVENOUS; SUBCUTANEOUS at 06:40

## 2024-05-25 RX ADMIN — MEMANTINE HYDROCHLORIDE 10 MILLIGRAM(S): 10 TABLET ORAL at 17:49

## 2024-05-25 RX ADMIN — Medication 5 MILLIGRAM(S): at 21:04

## 2024-05-25 RX ADMIN — Medication 1 TABLET(S): at 17:49

## 2024-05-25 RX ADMIN — ERTAPENEM SODIUM 120 MILLIGRAM(S): 1 INJECTION, POWDER, LYOPHILIZED, FOR SOLUTION INTRAMUSCULAR; INTRAVENOUS at 11:39

## 2024-05-25 RX ADMIN — HEPARIN SODIUM 5000 UNIT(S): 5000 INJECTION INTRAVENOUS; SUBCUTANEOUS at 17:49

## 2024-05-25 RX ADMIN — SODIUM CHLORIDE 50 MILLILITER(S): 9 INJECTION INTRAMUSCULAR; INTRAVENOUS; SUBCUTANEOUS at 12:21

## 2024-05-25 RX ADMIN — ESCITALOPRAM OXALATE 7.5 MILLIGRAM(S): 10 TABLET, FILM COATED ORAL at 11:38

## 2024-05-25 RX ADMIN — MEMANTINE HYDROCHLORIDE 10 MILLIGRAM(S): 10 TABLET ORAL at 06:40

## 2024-05-25 RX ADMIN — ATORVASTATIN CALCIUM 80 MILLIGRAM(S): 80 TABLET, FILM COATED ORAL at 21:04

## 2024-05-25 RX ADMIN — Medication 1000 MILLIGRAM(S): at 00:07

## 2024-05-25 RX ADMIN — Medication 5 MILLIGRAM(S): at 13:49

## 2024-05-25 RX ADMIN — Medication 5 MILLIGRAM(S): at 06:40

## 2024-05-25 RX ADMIN — SENNA PLUS 2 TABLET(S): 8.6 TABLET ORAL at 21:04

## 2024-05-25 RX ADMIN — PANTOPRAZOLE SODIUM 40 MILLIGRAM(S): 20 TABLET, DELAYED RELEASE ORAL at 06:40

## 2024-05-25 RX ADMIN — Medication 75 MICROGRAM(S): at 06:40

## 2024-05-25 RX ADMIN — Medication 1 TABLET(S): at 06:40

## 2024-05-25 RX ADMIN — POLYETHYLENE GLYCOL 3350 17 GRAM(S): 17 POWDER, FOR SOLUTION ORAL at 06:40

## 2024-05-25 RX ADMIN — CLOPIDOGREL BISULFATE 75 MILLIGRAM(S): 75 TABLET, FILM COATED ORAL at 11:39

## 2024-05-25 RX ADMIN — TAMSULOSIN HYDROCHLORIDE 0.4 MILLIGRAM(S): 0.4 CAPSULE ORAL at 21:05

## 2024-05-25 RX ADMIN — POLYETHYLENE GLYCOL 3350 17 GRAM(S): 17 POWDER, FOR SOLUTION ORAL at 17:49

## 2024-05-25 RX ADMIN — Medication 1000 MILLIGRAM(S): at 21:26

## 2024-05-25 RX ADMIN — Medication 81 MILLIGRAM(S): at 11:39

## 2024-05-25 NOTE — DIETITIAN INITIAL EVALUATION ADULT - PERTINENT LABORATORY DATA
05-25    145  |  115<H>  |  33<H>  ----------------------------<  86  3.7   |  26  |  1.10    Ca    8.3<L>      25 May 2024 07:27    TPro  5.5<L>  /  Alb  2.2<L>  /  TBili  0.6  /  DBili  x   /  AST  66<H>  /  ALT  46  /  AlkPhos  149<H>  05-25  A1C with Estimated Average Glucose Result: 6.0 % (06-22-23 @ 02:50)

## 2024-05-25 NOTE — PROGRESS NOTE ADULT - ASSESSMENT
Chi Deleon is a 79 yo male with PMH of hypothyroidism, hld, atherosclerotic heart disease, Meier's Palsy, MDD, Alzheimer's disease, BPH, and peripheral vascular disease who was brought in from [facility] for elevated WBC and BUN/Cr levels. The patient has been diagnosed with UTI the past three days and has been given Rocephin for tx which has not improved his WBC. Per patient chart, WBC and BUN/Cr levels were 22.65 and 40/2.84 on 5/22 and 23.12 and 52/2.2 on 5/23. The patient is unable to verbalize any active complaints at this time, his wife at bedside reports that he seems weaker and to be ambulating less often compared to his baseline. CT Chest No Cont was done and showed   Trace bibasilar pleural effusions with dependent atelectasis. No focal   consolidation.  Cholelithiasis with distended gallbladder. Correlate with symptomatology.   If warranted right upper quadrant ultrasound can be obtained.  Bladder wall thickening with perivesicular stranding suggestive of   cystitis.  Bilateral nonspecific perinephric stranding may also be seen in the   setting of UTI.  No obstructive uropathy.  Small rectal fecal impaction with associated mild stercoral proctitis  Admitted for septic workup ,iv hydration ,iv abx

## 2024-05-25 NOTE — DIETITIAN INITIAL EVALUATION ADULT - PERTINENT MEDS FT
MEDICATIONS  (STANDING):  aspirin  chewable 81 milliGRAM(s) Oral daily  atorvastatin 80 milliGRAM(s) Oral at bedtime  busPIRone 5 milliGRAM(s) Oral three times a day  clopidogrel Tablet 75 milliGRAM(s) Oral daily  ertapenem  IVPB 1000 milliGRAM(s) IV Intermittent every 24 hours  escitalopram 7.5 milliGRAM(s) Oral daily  heparin   Injectable 5000 Unit(s) SubCutaneous every 12 hours  lactobacillus acidophilus 1 Tablet(s) Oral every 12 hours  levothyroxine 75 MICROGram(s) Oral daily  melatonin 5 milliGRAM(s) Oral at bedtime  memantine 10 milliGRAM(s) Oral two times a day  mesalamine Suppository 1000 milliGRAM(s) Rectal at bedtime  pantoprazole    Tablet 40 milliGRAM(s) Oral before breakfast  polyethylene glycol 3350 17 Gram(s) Oral two times a day  senna 2 Tablet(s) Oral at bedtime  sodium chloride 0.9%. 1000 milliLiter(s) (50 mL/Hr) IV Continuous <Continuous>  tamsulosin 0.4 milliGRAM(s) Oral at bedtime    MEDICATIONS  (PRN):  acetaminophen     Tablet .. 650 milliGRAM(s) Oral every 6 hours PRN Temp greater or equal to 38C (100.4F), Mild Pain (1 - 3)  aluminum hydroxide/magnesium hydroxide/simethicone Suspension 30 milliLiter(s) Oral every 4 hours PRN Dyspepsia  bisacodyl Suppository 10 milliGRAM(s) Rectal daily PRN Constipation  magnesium hydroxide Suspension 30 milliLiter(s) Oral daily PRN Constipation  ondansetron Injectable 4 milliGRAM(s) IV Push every 8 hours PRN Nausea and/or Vomiting

## 2024-05-25 NOTE — DIETITIAN INITIAL EVALUATION ADULT - NSFNSPHYEXAMSKINFT_GEN_A_CORE
Pressure Injury 1: Left:, heel, Stage I  Pressure Injury 2: Right:, heel, Stage I  Pressure Injury 3: sacrum, Stage I  Pressure Injury 4: none, none  Pressure Injury 5: none, none  Pressure Injury 6: none, none  Pressure Injury 7: none, none  Pressure Injury 8: none, none  Pressure Injury 9: none, none  Pressure Injury 10: none, none  Pressure Injury 11: none, none Pressure Injury 1: Left:, heel, Stage I  Pressure Injury 2: Right:, heel, Stage I  Pressure Injury 3: sacrum, Stage I

## 2024-05-25 NOTE — DIETITIAN INITIAL EVALUATION ADULT - OTHER INFO
77 yo male with PMH of hypothyroidism, hld, atherosclerotic heart disease, Meier's Palsy, MDD, Alzheimer's disease, BPH, and peripheral vascular disease who was brought in from [facility] for elevated WBC and BUN/Cr levels. The patient has been diagnosed with UTI the past three days and has been given Rocephin for tx which has not improved his WBC  patient with ·  Abnormal CT scan, gallbladder.    Cholelithiasis, proctitis , stercoral colitis, fecal retention      77 yo male with PMH of hypothyroidism, hld, atherosclerotic heart disease, Meier's Palsy, MDD, Alzheimer's disease, BPH, and peripheral vascular disease who was brought in from [facility] for elevated WBC and BUN/Cr levels. The patient has been diagnosed with UTI the past three days and has been given Rocephin for tx which has not improved his WBC  patient with ·  Abnormal CT scan, gallbladder.    Cholelithiasis, proctitis , stercoral colitis, fecal retention  weight per transfer papers 168# wt bed scale 181.6#   IV NS 50ml hr

## 2024-05-25 NOTE — DIETITIAN INITIAL EVALUATION ADULT - ADD RECOMMEND
1. recommend MVI 2. recommend ensure plus HP BID  1. recommend MVI 2. recommend ensure plus HP BID left message with MD to activate diet to include supplement

## 2024-05-25 NOTE — PROGRESS NOTE ADULT - ASSESSMENT
77 yo male with PMH of hypothyroidism, hld, atherosclerotic heart disease, Meier's Palsy, MDD, Alzheimer's disease, BPH, and peripheral vascular disease, who was brought in from [facility] for elevated WBC and BUN/Cr levels. Recently diagnosed with UTI the past three days and has been given Rocephin for tx which has not improved his WBC.    CT showed evidence of UTI as well as holelithiasis with distended gallbladder. Started on ertapenem pending cultures given persistent leukocytosis despite ceftriaxone. WBC improved to 12 today. Urine culture growing E. coli.    #Leukocytosis  #UTI  #Cholelithiasis  #Hx of penicillin allergy    -continue ertapenem pending E. coli sensitivities  -follow blood and urine cultures  -follow up RUQ US  -monitor WBC, LFT's  -discussed with daughter at bedside    Akua Hernandez MD  Division of Infectious Diseases   Cell [phone] between 8am and 6pm   After 6pm and weekends please call ID service at [phone].     35 minutes spent on total encounter assessing patient, examination, chart review, counseling and coordinating care by the attending physician/nurse/care manager.

## 2024-05-25 NOTE — DIETITIAN INITIAL EVALUATION ADULT - PROBLEM SELECTOR PLAN 2
2/2 to UTI and CAROLA , POA - as per med staff from [facility] renal indices are improving but WBC remains high Abdominal Pain, N/V/D

## 2024-05-25 NOTE — PROGRESS NOTE ADULT - ASSESSMENT
REASON FOR VISIT  .. Management of problems listed below      ROS  . Patient unable to give ROS     PHYSICAL EXAM    HEENT Unremarkable  atraumatic   RESP Fair air entry  Harsh breath sound   CARDIAC S1 S2 No S3     NO JVD    ABDOMEN No hepatosplenomegaly   PEDAL EDEMA present No calf tenderness  NO rash       GENERAL DATA .   GOC.    ..  prior   ICU STAY.    .. no   COVID.   .. 5/24/2024 scv2 (-)   BEST TRACTICE ISSUES.  . HOB ELEVATN.  .. Yes  . DVT PPLX.  5/24/2024 HPSC   . STRESS ULCER PPLX. 5/24/2024 PROTONIX 40   . DIET.  5/24/2024 NPO   . IV FLUIDS...... 5/24/2024 NS 50     ALLGY.   .. pncl      WT.   ..  5/24/2024 86  BMI.   .. 5/24/2024 27     ABGS.   .  VS/ PO/IO/ VENT/ DRIPS.   5/25/2024 afeb 70 110/60   5/25/2024 ra 92%     . CC .   . 5/24/2024 BIBA from [facility] for continuing elevated WBC d/t UTI- despite antibiotics and today noted to have increased BUN and Cr. Hx dementia, DNR/DNI with limited interventions as per facility paperwork.    PATIENT DATA.  RESP.   INFECTION.  …. Esr 5/24/2024 esr 37   …. W 5/24-5/25/2024 w 20 - 12   .... ua 5/24/2024 ua w 50 r 50 bact moder   .... pr 5/24-5/25/2024 pr 19-8.2    .... ct cap 5/24/2024   ........ Tr bl effsns with dependent atelectasis   ........ cholelithiasis and distended gb   ........ bladder wall thickening and perivesicular stranding   ........ small rectal fecal impaction with mild stercoral proctitis   .... rvp 5/24/2024 (-)   .... bc 5/24 (-)   .... uc 5/24 10-49 E coli  ..... 5/24/2024 ERTAPENEM X 3D   CARDIAC.  .... 5/24/2024 ASA 81   .... 5/24/2024 PLAVX 75  .... 5/24/2024 ATORVASTAT 80    HEMAT.  …. Hb 5/24-5/25/2024 Hb 12.4 - 11.3   .... Plt 5/24/2024 Plt 93   .... INR 5/24/2024 .89   GI.  .... LFTS 5/24/2024   ........   ........ AST 70  ........ ALT 38   ..... 5/24/2024 MIRALAX   .... 5/24/2024 MESCALAMIN 1000   RENAL.  .... Na 5/24/2024 Na 142   .... K 5/24/2024 K 4.1   .... CO2 5/24/2024 co2 26  .... Cr 5/24-5/25/2024 Cr 1.4-1.1   .... 5/24/2024 TAMSULOSIN .4   ENDO.  .... 5/24/2024 LEVOXYL 75   NEURO.  .... 5/24/2024 BUSPAR 5.3   .... 5/24/2024 LEXAPRO 75   .... 5/24/2024 MEMANTINE 10.2   SKIN.    . BRIEFLY, .      . 5/24/2024 79 yo male with PMH of hypothyroidism, hld, atherosclerotic heart disease, Meier's Palsy, MDD, Alzheimer's disease, BPH, and peripheral vascular disease who was brought in from [facility] for elevated WBC and BUN/Cr levels. The patient has been diagnosed with UTI the past three days and has been given Rocephin for tx which has not improved his WBC. Per patient chart, WBC and BUN/Cr levels were 22.65 and 40/2.84 on 5/22 and 23.12 and 52/2.2 on 5/23. The patient is unable to verbalize any active complaints at this time, his wife at bedside reports that he seems weaker and to be ambulating less often compared to his baseline.     . COURSE   .... 5/24/2024  Started on ertapenem for presumed uti     . VISIT HISTORY .  PAST MEDICAL HISTORY:  CAD (coronary artery disease)   HLD (hyperlipidemia)   HTN (hypertension)   Hypothyroid   Dementia  Memory loss   Myocardial infarction 2002,2006  JILLIAN on CPAP   Other secondary osteoarthritis of left knee   PAD (peripheral artery disease).   BPH (benign prostatic hyperplasia)     PAST SURGICAL HISTORY:  Coronary angioplasty status PCI with stents 2002 LAD,  2006 x 1  History of left knee replacement   S/P arthroscopy of left knee 2014  Status post arterial stent.  . SIGNIFICANT HOME MEDS.   · aspirin 81 mg oral tablet, chewable: 1 tab(s) orally once a day  · pantoprazole 40 mg oral delayed release tablet: 1 tab(s) orally once a day (before a meal)  · senna oral tablet: 2 tab(s) orally once a day (at bedtime)  · docusate sodium 100 mg oral capsule: 1 cap(s) orally 3 times a day  · aspirin 81 mg oral delayed release tablet: 1 tab(s) orally 2 times a day x 40 more days  · tamsulosin 0.4 mg oral capsule: 1 cap(s) orally once a day (at bedtime)  · oxyCODONE 10 mg oral tablet: 1 tab(s) orally every 3 hours, As needed, Moderate Pain (4 - 6)  · oxyCODONE 5 mg oral tablet: 1 tab(s) orally every 3 hours, As needed, Mild Pain (1 - 3)  · acetaminophen 500 mg oral tablet: 2 tab(s) orally every 12 hours  · Rapaflo 8 mg oral capsule: 1 cap(s) orally once a day (at bedtime)  · memantine 10 mg oral tablet: 1 tab(s) orally once a day  · rosuvastatin 20 mg oral capsule: 1 tab(s) orally once a day  · Detrol: 4 milligram(s) orally once a day  · Aricept 10 mg oral tablet: 1 tab(s) orally once a day (at bedtime)  · Multi Vitamin+: 1 tab(s) orally  · Fish Oil oral capsule: 1 tab(s) orally once a day  · Vitamin B12 50 mcg oral tablet: 1 tab(s) orally once a day  · atenolol 25 mg oral tablet: 1 tab(s) orally once a day  · memantine 5 mg oral tablet: 1 tab(s) orally once a day (at bedtime)  · memantine 10 mg oral tablet: orally 2 times a day  · Crestor 20 mg oral tablet: 1 tab(s) orally once a day (at bedtime)  · escitalopram 5 mg oral tablet: 0.5 tab(s) orally once a day (in the morning)  · Plavix 75 mg oral tablet: 1 tab(s) orally once a day  · clopidogrel 75 mg oral tablet: 1 tab(s) orally once a day  · Fleet Enema 19 g-7 g rectal enema: 133 milliliter(s) rectally once a day as needed for  constipation  · tamsulosin 0.4 mg oral capsule: 1 cap(s) orally once a day  · bisacodyl 10 mg rectal suppository: 1 suppository(ies) rectally once a day as needed for  constipation  · levothyroxine 75 mcg (0.075 mg) oral tablet: 1 tab(s) orally once a day  · levothyroxine 75 mcg (0.075 mg) oral tablet: 1 tab(s) orally once a day    . DEVICES/TUBES/DECUBS/PROCEDURES.    . PROBLEM/PLAN.    . SEPSIS 5/24/2024   . UTI 5/24/2024   . MILD STERCORAL PROCTITIS 5/24/2024 CT   …. Esr 5/24/2024 esr 37   …. W 5/24/2024 w 20   .... ua 5/24/2024 ua w 50 r 50 bact moder   .... pr 5/24/2024 pr 19   .... ct cap 5/24/2024   ........ Tr bl effsns with dependent atelectasis   ........ cholelithiasis and distended gb   ........ bladder wall thickening and perivesicular stranding   ........ small rectal fecal impaction with mild stercoral proctitis   .... rvp 5/24/2024 (-)   ..... 5/24/2024 ERTAPENEM X 3D     . ATELECTASIS 5/24/2024 CT     . CAD.   .... 5/24/2024 ASA 81   .... 5/24/2024 PLAVX 75     . THROMBOCYTOPENIA 5/24/2024 [PLT 93   .... monitor     . RECTAL FECAL IMPACTN 5/24/2024 CT   .... on laxativ    . ELEVATED LFTS 5/24/2024 ALT 70   .... monitor     . CAROLA 5/24/2024 CR 1.4  ..... Fluids monitor     . HYPOTHYROID.  . ALZHEIMERS     . CURRENT ISSUES.  . SEPSIS 5/24/2024   . UTI 5/24/2024 10-49 E coli   . MILD STERCORAL PROCTITIS 5/24/2024 CT   . ATELECTASIS 5/24/2024 CT   . TRACE BILATERAL PLEURAL EFFUSNS 5/24/2024 CT   . CAD.   . THROMBOCYTOPENIA 5/24/2024 [PLT 93   . RECTAL FECAL IMPACTN 5/24/2024 CT   . ELEVATED LFTS 5/24/2024 ALT 70   . CHOLELITHIASIS AND DISTENDED GALLBLADDER 5/24/2024 CT   . CAROLA 5/24-5/25/2024 CR 1.4-1.1  . HYPOTHYROID.  . ALZHEIMERS   . PENICILLIN ALLERGY     TIME SPENT.  . Over 36 minutes aggregate care time spent on encounter; activities included   direct patient care, counseling and/or coordinating care reviewing notes, lab data/ imaging , discussion with multidisciplinary team/ patient  /family and explaining in detail risks, benefits, alternatives  of the recommendations     PATIENT.  . MASTER CARLITOS 78 m 5/24/2024 1947 DR SINA ELY

## 2024-05-25 NOTE — DIETITIAN INITIAL EVALUATION ADULT - ORAL INTAKE PTA/DIET HISTORY
Detail Level: Simple Additional Notes: Patient consent was obtained to proceed with the visit and recommended plan of care after discussion of all risks and benefits, including the risks of COVID-19 exposure. patient seen with family at bedside. patient from memory unit at [facility]. per transfer papers regular thin liquids with ensure supplement.  MOLST no tube feeding. no food allergies. patient NPO with GI on case  patient seen with family at bedside. patient from memory unit at [facility]. per transfer papers regular thin liquids with ensure supplement.  MOLST no tube feeding. no food allergies.   education deferred in this patient from [facility]

## 2024-05-25 NOTE — PATIENT PROFILE ADULT - FALL HARM RISK - HARM RISK INTERVENTIONS

## 2024-05-25 NOTE — PROGRESS NOTE ADULT - ASSESSMENT
Chi Deleon is a 77 yo male with PMH of hypothyroidism, hld, atherosclerotic heart disease, Meier's Palsy, MDD, Alzheimer's disease, BPH, and peripheral vascular disease who was brought in from [facility] for elevated WBC and BUN/Cr levels. The patient has been diagnosed with UTI the past three days and has been given Rocephin for tx which has not improved his WBC. Per patient chart, WBC and BUN/Cr levels were 22.65 and 40/2.84 on 5/22 and 23.12 and 52/2.2 on 5/23. The patient is unable to verbalize any active complaints at this time, his wife at bedside reports that he seems weaker and to be ambulating less often compared to his baseline. CT Chest No Cont was done and showed Trace bibasilar pleural effusions with dependent atelectasis. No focal consolidation.Cholelithiasis with distended gallbladder. Correlate with symptomatology. If warranted right upper quadrant ultrasound can be obtained.Bladder wall thickening with perivesicular stranding suggestive of cystitis.Bilateral nonspecific perinephric stranding may also be seen in the setting of UTI.No obstructive uropathy.Small rectal fecal impaction with associated mild stercoral proctitis Admitted for septic workup ,iv hydration ,iv abx     ACUTE RENAL FAILURE:   Serum creatinine is  at  1.1    , approximating GFR at   ml/min.   There is no progression . No uremic symptoms  No evidence of anemia .  Fluid status stable.  Will continue to avoid nephrotoxic drugs.  Patient remains asymptomatic.   Continue current therapy.  hold  diuretic.  hold   ACE inhibitor.  hold   ARB.        Admit for septic workup and ID evaluation,send blood and urine cx,serial lactate levels,monitor vitals kimberley hollinss hydration,monitor urine output and renal profile,iv abx as per id cons      BP monitoring,continue current antihypertensive meds, low salt diet,followup with PMD in 1-2 weeks

## 2024-05-25 NOTE — DIETITIAN INITIAL EVALUATION ADULT - PROBLEM SELECTOR PLAN 10
Clothing
Supportive care ,frequent redirection ,continue home medications ,management of agitation as needed

## 2024-05-25 NOTE — PROGRESS NOTE ADULT - ASSESSMENT
Stercoral colitis  Fecal retention  Cholelithiasis    CT noted,  d/w wife  Bowel regimen  Mesalamine suppository qhs  Monitor for BM  LFTs grossly within normal limits; trend  Follow up RUQ US    I reviewed the overnight course of events on the unit, re-confirming the patient history. I discussed the care with the patient  Differential diagnosis and plan of care discussed with patient after the evaluation  35 minutes spent on total encounter of which more than fifty percent of the encounter was spent counseling and/or coordinating care by the attending physician.

## 2024-05-26 LAB
ALBUMIN SERPL ELPH-MCNC: 2.3 G/DL — LOW (ref 3.3–5)
ALP SERPL-CCNC: 177 U/L — HIGH (ref 40–120)
ALT FLD-CCNC: 54 U/L — SIGNIFICANT CHANGE UP (ref 12–78)
ANION GAP SERPL CALC-SCNC: 5 MMOL/L — SIGNIFICANT CHANGE UP (ref 5–17)
AST SERPL-CCNC: 67 U/L — HIGH (ref 15–37)
BILIRUB DIRECT SERPL-MCNC: 0.2 MG/DL — SIGNIFICANT CHANGE UP (ref 0–0.3)
BILIRUB INDIRECT FLD-MCNC: 0.4 MG/DL — SIGNIFICANT CHANGE UP (ref 0.2–1)
BILIRUB SERPL-MCNC: 0.6 MG/DL — SIGNIFICANT CHANGE UP (ref 0.2–1.2)
BUN SERPL-MCNC: 28 MG/DL — HIGH (ref 7–23)
CALCIUM SERPL-MCNC: 8.7 MG/DL — SIGNIFICANT CHANGE UP (ref 8.5–10.1)
CHLORIDE SERPL-SCNC: 114 MMOL/L — HIGH (ref 96–108)
CO2 SERPL-SCNC: 27 MMOL/L — SIGNIFICANT CHANGE UP (ref 22–31)
CREAT SERPL-MCNC: 0.96 MG/DL — SIGNIFICANT CHANGE UP (ref 0.5–1.3)
EGFR: 82 ML/MIN/1.73M2 — SIGNIFICANT CHANGE UP
GLUCOSE SERPL-MCNC: 114 MG/DL — HIGH (ref 70–99)
HCT VFR BLD CALC: 34.5 % — LOW (ref 39–50)
HGB BLD-MCNC: 11.6 G/DL — LOW (ref 13–17)
MAGNESIUM SERPL-MCNC: 2.1 MG/DL — SIGNIFICANT CHANGE UP (ref 1.6–2.6)
MCHC RBC-ENTMCNC: 31.3 PG — SIGNIFICANT CHANGE UP (ref 27–34)
MCHC RBC-ENTMCNC: 33.6 GM/DL — SIGNIFICANT CHANGE UP (ref 32–36)
MCV RBC AUTO: 93 FL — SIGNIFICANT CHANGE UP (ref 80–100)
NRBC # BLD: 0 /100 WBCS — SIGNIFICANT CHANGE UP (ref 0–0)
PLATELET # BLD AUTO: 95 K/UL — LOW (ref 150–400)
POTASSIUM SERPL-MCNC: 3.7 MMOL/L — SIGNIFICANT CHANGE UP (ref 3.5–5.3)
POTASSIUM SERPL-SCNC: 3.7 MMOL/L — SIGNIFICANT CHANGE UP (ref 3.5–5.3)
PROT SERPL-MCNC: 5.8 G/DL — LOW (ref 6–8.3)
RBC # BLD: 3.71 M/UL — LOW (ref 4.2–5.8)
RBC # FLD: 14 % — SIGNIFICANT CHANGE UP (ref 10.3–14.5)
SODIUM SERPL-SCNC: 146 MMOL/L — HIGH (ref 135–145)
WBC # BLD: 7.95 K/UL — SIGNIFICANT CHANGE UP (ref 3.8–10.5)
WBC # FLD AUTO: 7.95 K/UL — SIGNIFICANT CHANGE UP (ref 3.8–10.5)

## 2024-05-26 PROCEDURE — 76705 ECHO EXAM OF ABDOMEN: CPT | Mod: 26

## 2024-05-26 PROCEDURE — 99232 SBSQ HOSP IP/OBS MODERATE 35: CPT

## 2024-05-26 RX ADMIN — ERTAPENEM SODIUM 120 MILLIGRAM(S): 1 INJECTION, POWDER, LYOPHILIZED, FOR SOLUTION INTRAMUSCULAR; INTRAVENOUS at 12:55

## 2024-05-26 RX ADMIN — MEMANTINE HYDROCHLORIDE 10 MILLIGRAM(S): 10 TABLET ORAL at 06:04

## 2024-05-26 RX ADMIN — ESCITALOPRAM OXALATE 7.5 MILLIGRAM(S): 10 TABLET, FILM COATED ORAL at 12:55

## 2024-05-26 RX ADMIN — HEPARIN SODIUM 5000 UNIT(S): 5000 INJECTION INTRAVENOUS; SUBCUTANEOUS at 18:23

## 2024-05-26 RX ADMIN — Medication 5 MILLIGRAM(S): at 14:17

## 2024-05-26 RX ADMIN — Medication 5 MILLIGRAM(S): at 21:25

## 2024-05-26 RX ADMIN — CLOPIDOGREL BISULFATE 75 MILLIGRAM(S): 75 TABLET, FILM COATED ORAL at 12:56

## 2024-05-26 RX ADMIN — Medication 1 TABLET(S): at 06:04

## 2024-05-26 RX ADMIN — Medication 81 MILLIGRAM(S): at 12:56

## 2024-05-26 RX ADMIN — Medication 1 TABLET(S): at 18:22

## 2024-05-26 RX ADMIN — MEMANTINE HYDROCHLORIDE 10 MILLIGRAM(S): 10 TABLET ORAL at 18:22

## 2024-05-26 RX ADMIN — Medication 5 MILLIGRAM(S): at 06:04

## 2024-05-26 RX ADMIN — POLYETHYLENE GLYCOL 3350 17 GRAM(S): 17 POWDER, FOR SOLUTION ORAL at 06:05

## 2024-05-26 RX ADMIN — ATORVASTATIN CALCIUM 80 MILLIGRAM(S): 80 TABLET, FILM COATED ORAL at 21:25

## 2024-05-26 RX ADMIN — Medication 1000 MILLIGRAM(S): at 21:53

## 2024-05-26 RX ADMIN — Medication 75 MICROGRAM(S): at 06:04

## 2024-05-26 RX ADMIN — SENNA PLUS 2 TABLET(S): 8.6 TABLET ORAL at 21:25

## 2024-05-26 RX ADMIN — TAMSULOSIN HYDROCHLORIDE 0.4 MILLIGRAM(S): 0.4 CAPSULE ORAL at 21:25

## 2024-05-26 RX ADMIN — HEPARIN SODIUM 5000 UNIT(S): 5000 INJECTION INTRAVENOUS; SUBCUTANEOUS at 06:04

## 2024-05-26 NOTE — PROGRESS NOTE ADULT - ASSESSMENT
Chi Deleon is a 79 yo male with PMH of hypothyroidism, hld, atherosclerotic heart disease, Meier's Palsy, MDD, Alzheimer's disease, BPH, and peripheral vascular disease who was brought in from [facility] for elevated WBC and BUN/Cr levels. The patient has been diagnosed with UTI the past three days and has been given Rocephin for tx which has not improved his WBC. Per patient chart, WBC and BUN/Cr levels were 22.65 and 40/2.84 on 5/22 and 23.12 and 52/2.2 on 5/23. The patient is unable to verbalize any active complaints at this time, his wife at bedside reports that he seems weaker and to be ambulating less often compared to his baseline. CT Chest No Cont was done and showed   Trace bibasilar pleural effusions with dependent atelectasis. No focal   consolidation.  Cholelithiasis with distended gallbladder. Correlate with symptomatology.   If warranted right upper quadrant ultrasound can be obtained.  Bladder wall thickening with perivesicular stranding suggestive of   cystitis.  Bilateral nonspecific perinephric stranding may also be seen in the   setting of UTI.  No obstructive uropathy.  Small rectal fecal impaction with associated mild stercoral proctitis  Admitted for septic workup ,iv hydration ,iv abx

## 2024-05-26 NOTE — PHYSICAL THERAPY INITIAL EVALUATION ADULT - PLANNED THERAPY INTERVENTIONS, PT EVAL
CM called CHARLEEN at [phone] and was given CAILIN Campuzano [place] number [phone]. CM called her and informed pt may d/c in next day or two. She is going to reach out to pt's family about assisting in home so pest control can be brought in. She would like to be called when pt discharges so pt's services can be started again - meals and aide. CM spoke with patient and updated her. She verbalized understanding.        Lori Gray RN, BSN  [phone]
CM called pt's CM with [facility] [phone] and informed pt discharging home today. She states she will get her Meal deliveries restarted. She is waiting on call back from daughter about assistance with pt's home so that pest control can be done. CM spoke to son and he does not get off work until 11. Pt's RN [facility] spoke to daughter and she will be here at one to get patient.      Patient discharged 7/22/2021 to home   All discharge needs met per case management    Judd Snow RN, BSN  [phone]
discharge will be between 10 AM and noon. Transportation at the time of discharge:  Pt may need a ride home. Unsure if children will come to pick her up.     Electronically signed by RIGOBERTO Blas, REGINA on 7/19/2021 at 6:08 PM
balance training/bed mobility training/gait training/transfer training

## 2024-05-26 NOTE — PROGRESS NOTE ADULT - ASSESSMENT
79 yo male with PMH of hypothyroidism, hld, atherosclerotic heart disease, Meier's Palsy, MDD, Alzheimer's disease, BPH, and peripheral vascular disease, who was brought in from [facility] for elevated WBC and BUN/Cr levels. Recently diagnosed with UTI the past three days and has been given Rocephin for tx which has not improved his WBC.    CT showed evidence of UTI as well as cholelithiasis with distended gallbladder. RUQ US showed no definite evidence of acute cholecystitis. Started on ertapenem pending cultures given persistent leukocytosis despite ceftriaxone. Leukocytosis now resolved. Urine culture growing E. coli. Blood cultures currently no growth.    #Leukocytosis  #UTI  #Cholelithiasis  #Hx of penicillin allergy    -continue ertapenem pending E. coli sensitivities  -follow blood and urine cultures  -monitor WBC, LFT's  -discussed with wife at bedside  -discussed with Dr. Washburn  -I will be covered by Dr. Do tomorrow, 5/27/24    Akua Hernandez MD  Division of Infectious Diseases   Cell [phone] between 8am and 6pm   After 6pm and weekends please call ID service at [phone].     35 minutes spent on total encounter assessing patient, examination, chart review, counseling and coordinating care by the attending physician/nurse/care manager.

## 2024-05-26 NOTE — PROVIDER CONTACT NOTE (OTHER) - REASON
Informed provider that pt has a hx of JILLIAN on cpap. Informed provider respiratory therapist was contacted re cpap and therapist stated to call provider. Informed provider that pt has a hx of JILLIAN on cpap. Informed provider respiratory was contacted re cpap and therapist stated to call provider. Informed provider that pt has a hx of JILLIAN on cpap. Informed provider respiratory was contacted re cpap and he stated to call provider. Plan: Recommend follow up with patient's orthopedic surgeon Detail Level: Zone

## 2024-05-26 NOTE — PROVIDER CONTACT NOTE (OTHER) - REASON
Informed provider tele was called to obtain remote tele with continuous pulse ox and was told there are no more remote tele boxes available. AND notified and aware. Informed provider tele was called to obtain remote tele with continuous pulse ox and was told there are no more remote tele boxes available. ADN notified and aware.

## 2024-05-26 NOTE — PROGRESS NOTE ADULT - ASSESSMENT
Chi Deleon is a 79 yo male with PMH of hypothyroidism, hld, atherosclerotic heart disease, Meier's Palsy, MDD, Alzheimer's disease, BPH, and peripheral vascular disease who was brought in from [facility] for elevated WBC and BUN/Cr levels. The patient has been diagnosed with UTI the past three days and has been given Rocephin for tx which has not improved his WBC. Per patient chart, WBC and BUN/Cr levels were 22.65 and 40/2.84 on 5/22 and 23.12 and 52/2.2 on 5/23. The patient is unable to verbalize any active complaints at this time, his wife at bedside reports that he seems weaker and to be ambulating less often compared to his baseline. CT Chest No Cont was done and showed Trace bibasilar pleural effusions with dependent atelectasis. No focal consolidation.Cholelithiasis with distended gallbladder. Correlate with symptomatology. If warranted right upper quadrant ultrasound can be obtained.Bladder wall thickening with perivesicular stranding suggestive of cystitis.Bilateral nonspecific perinephric stranding may also be seen in the setting of UTI.No obstructive uropathy.Small rectal fecal impaction with associated mild stercoral proctitis Admitted for septic workup ,iv hydration ,iv abx     ACUTE RENAL FAILURE:   Serum creatinine is  at  1.1    , approximating GFR at   ml/min.   There is no progression . No uremic symptoms  No evidence of anemia .  Fluid status stable.  Will continue to avoid nephrotoxic drugs.  Patient remains asymptomatic.   Continue current therapy.  hold  diuretic.  hold   ACE inhibitor.  hold   ARB.        Admit for septic workup and ID evaluation,send blood and urine cx,serial lactate levels,monitor vitals kimberley hollinss hydration,monitor urine output and renal profile,iv abx as per id cons      BP monitoring,continue current antihypertensive meds, low salt diet,followup with PMD in 1-2 weeks

## 2024-05-26 NOTE — PHYSICAL THERAPY INITIAL EVALUATION ADULT - PERTINENT HX OF CURRENT PROBLEM, REHAB EVAL
Chi Deleon is a 79 yo male with PMH of hypothyroidism, hld, atherosclerotic heart disease, Meier's Palsy, MDD, Alzheimer's disease, BPH, and peripheral vascular disease who was brought in from [email or web address] for elevated WBC and BUN/Cr levels. The patient has been diagnosed with UTI the past three days and has been given Rocephin for tx which has not improved his WBC. Per patient chart, WBC and BUN/Cr levels were 22.65 and 40/2.84 on 5/22 and 23.12 and 52/2.2 on 5/23. The patient is unable to verbalize any active complaints at this time, his wife at bedside reports that he seems weaker and to be ambulating less often compared to his baseline.

## 2024-05-26 NOTE — PHYSICAL THERAPY INITIAL EVALUATION ADULT - ADDITIONAL COMMENTS
Patient resides in LTC facility, as per wife patient required assistance for ADLs but was able to ambulate without device with supervision, although he had been declining recently

## 2024-05-26 NOTE — CHART NOTE - NSCHARTNOTEFT_GEN_A_CORE
Called by Rn that patient has a hx of JILLIAN w/CPAP use, however patient has not been ordered for one. Per the chart, it appears patient should be on CPAP overnight (as of HIE 5/2022). Will order CPAP with peep of 10; day team to confirm CPAP compliance and need for continued nocturnal use.

## 2024-05-27 LAB
ANION GAP SERPL CALC-SCNC: 4 MMOL/L — LOW (ref 5–17)
BUN SERPL-MCNC: 23 MG/DL — SIGNIFICANT CHANGE UP (ref 7–23)
CALCIUM SERPL-MCNC: 8.4 MG/DL — LOW (ref 8.5–10.1)
CHLORIDE SERPL-SCNC: 115 MMOL/L — HIGH (ref 96–108)
CO2 SERPL-SCNC: 28 MMOL/L — SIGNIFICANT CHANGE UP (ref 22–31)
CREAT SERPL-MCNC: 0.99 MG/DL — SIGNIFICANT CHANGE UP (ref 0.5–1.3)
EGFR: 79 ML/MIN/1.73M2 — SIGNIFICANT CHANGE UP
GLUCOSE SERPL-MCNC: 129 MG/DL — HIGH (ref 70–99)
HCT VFR BLD CALC: 34.8 % — LOW (ref 39–50)
HGB BLD-MCNC: 11.5 G/DL — LOW (ref 13–17)
MAGNESIUM SERPL-MCNC: 2.1 MG/DL — SIGNIFICANT CHANGE UP (ref 1.6–2.6)
MCHC RBC-ENTMCNC: 31 PG — SIGNIFICANT CHANGE UP (ref 27–34)
MCHC RBC-ENTMCNC: 33 GM/DL — SIGNIFICANT CHANGE UP (ref 32–36)
MCV RBC AUTO: 93.8 FL — SIGNIFICANT CHANGE UP (ref 80–100)
NRBC # BLD: 0 /100 WBCS — SIGNIFICANT CHANGE UP (ref 0–0)
PLATELET # BLD AUTO: 109 K/UL — LOW (ref 150–400)
POTASSIUM SERPL-MCNC: 3.7 MMOL/L — SIGNIFICANT CHANGE UP (ref 3.5–5.3)
POTASSIUM SERPL-SCNC: 3.7 MMOL/L — SIGNIFICANT CHANGE UP (ref 3.5–5.3)
RBC # BLD: 3.71 M/UL — LOW (ref 4.2–5.8)
RBC # FLD: 14 % — SIGNIFICANT CHANGE UP (ref 10.3–14.5)
SODIUM SERPL-SCNC: 147 MMOL/L — HIGH (ref 135–145)
WBC # BLD: 7.24 K/UL — SIGNIFICANT CHANGE UP (ref 3.8–10.5)
WBC # FLD AUTO: 7.24 K/UL — SIGNIFICANT CHANGE UP (ref 3.8–10.5)

## 2024-05-27 RX ORDER — SODIUM CHLORIDE 9 MG/ML
1000 INJECTION, SOLUTION INTRAVENOUS
Refills: 0 | Status: DISCONTINUED | OUTPATIENT
Start: 2024-05-27 | End: 2024-05-28

## 2024-05-27 RX ADMIN — Medication 5 MILLIGRAM(S): at 21:27

## 2024-05-27 RX ADMIN — POLYETHYLENE GLYCOL 3350 17 GRAM(S): 17 POWDER, FOR SOLUTION ORAL at 17:12

## 2024-05-27 RX ADMIN — Medication 5 MILLIGRAM(S): at 06:29

## 2024-05-27 RX ADMIN — ATORVASTATIN CALCIUM 80 MILLIGRAM(S): 80 TABLET, FILM COATED ORAL at 21:27

## 2024-05-27 RX ADMIN — Medication 5 MILLIGRAM(S): at 14:34

## 2024-05-27 RX ADMIN — Medication 1 TABLET(S): at 17:12

## 2024-05-27 RX ADMIN — ERTAPENEM SODIUM 120 MILLIGRAM(S): 1 INJECTION, POWDER, LYOPHILIZED, FOR SOLUTION INTRAMUSCULAR; INTRAVENOUS at 11:12

## 2024-05-27 RX ADMIN — HEPARIN SODIUM 5000 UNIT(S): 5000 INJECTION INTRAVENOUS; SUBCUTANEOUS at 17:12

## 2024-05-27 RX ADMIN — Medication 81 MILLIGRAM(S): at 11:11

## 2024-05-27 RX ADMIN — SENNA PLUS 2 TABLET(S): 8.6 TABLET ORAL at 21:27

## 2024-05-27 RX ADMIN — Medication 75 MICROGRAM(S): at 06:29

## 2024-05-27 RX ADMIN — MEMANTINE HYDROCHLORIDE 10 MILLIGRAM(S): 10 TABLET ORAL at 06:29

## 2024-05-27 RX ADMIN — MEMANTINE HYDROCHLORIDE 10 MILLIGRAM(S): 10 TABLET ORAL at 17:12

## 2024-05-27 RX ADMIN — POLYETHYLENE GLYCOL 3350 17 GRAM(S): 17 POWDER, FOR SOLUTION ORAL at 06:30

## 2024-05-27 RX ADMIN — TAMSULOSIN HYDROCHLORIDE 0.4 MILLIGRAM(S): 0.4 CAPSULE ORAL at 21:27

## 2024-05-27 RX ADMIN — Medication 1000 MILLIGRAM(S): at 21:50

## 2024-05-27 RX ADMIN — SODIUM CHLORIDE 40 MILLILITER(S): 9 INJECTION, SOLUTION INTRAVENOUS at 12:24

## 2024-05-27 RX ADMIN — HEPARIN SODIUM 5000 UNIT(S): 5000 INJECTION INTRAVENOUS; SUBCUTANEOUS at 06:29

## 2024-05-27 RX ADMIN — Medication 1 TABLET(S): at 06:29

## 2024-05-27 RX ADMIN — ESCITALOPRAM OXALATE 7.5 MILLIGRAM(S): 10 TABLET, FILM COATED ORAL at 11:11

## 2024-05-27 RX ADMIN — CLOPIDOGREL BISULFATE 75 MILLIGRAM(S): 75 TABLET, FILM COATED ORAL at 11:11

## 2024-05-27 NOTE — PROGRESS NOTE ADULT - PROBLEM SELECTOR PLAN 7
Stercoral colitis  Fecal retention  Cholelithiasis  Bowel regimen  Mesalamine suppository qhs  Monitor for BM

## 2024-05-27 NOTE — PROGRESS NOTE ADULT - ASSESSMENT
Chi Deleon is a 77 yo male with PMH of hypothyroidism, hld, atherosclerotic heart disease, Meier's Palsy, MDD, Alzheimer's disease, BPH, and peripheral vascular disease who was brought in from [email or web address] for elevated WBC and BUN/Cr levels. The patient has been diagnosed with UTI the past three days and has been given Rocephin for tx which has not improved his WBC. Per patient chart, WBC and BUN/Cr levels were 22.65 and 40/2.84 on 5/22 and 23.12 and 52/2.2 on 5/23. The patient is unable to verbalize any active complaints at this time, his wife at bedside reports that he seems weaker and to be ambulating less often compared to his baseline. CT Chest No Cont was done and showed Trace bibasilar pleural effusions with dependent atelectasis. No focal consolidation.Cholelithiasis with distended gallbladder. Correlate with symptomatology. If warranted right upper quadrant ultrasound can be obtained.Bladder wall thickening with perivesicular stranding suggestive of cystitis.Bilateral nonspecific perinephric stranding may also be seen in the setting of UTI.No obstructive uropathy.Small rectal fecal impaction with associated mild stercoral proctitis Admitted for septic workup ,iv hydration ,iv abx       hypernatremia start d5w     ACUTE RENAL FAILURE:   Serum creatinine is  at  1.1    , approximating GFR at   ml/min.   There is no progression . No uremic symptoms  No evidence of anemia .  Fluid status stable.  Will continue to avoid nephrotoxic drugs.  Patient remains asymptomatic.   Continue current therapy.  hold  diuretic.  hold   ACE inhibitor.  hold   ARB.        Admit for septic workup and ID evaluation,send blood and urine cx,serial lactate levels,monitor vitals kimberley hollinss hydration,monitor urine output and renal profile,iv abx as per id cons      BP monitoring,continue current antihypertensive meds, low salt diet,followup with PMD in 1-2 weeks

## 2024-05-27 NOTE — PROGRESS NOTE ADULT - PROBLEM SELECTOR PLAN 10
Supportive care  Frequent redirection  Continue home medications  Management of agitation as needed

## 2024-05-27 NOTE — PROGRESS NOTE ADULT - ASSESSMENT
Chi Deleon is a 77 yo male with PMH of hypothyroidism, hld, atherosclerotic heart disease, Meier's Palsy, MDD, Alzheimer's disease, BPH, and peripheral vascular disease who was brought in from [facility] for elevated WBC and BUN/Cr levels. The patient has been diagnosed with UTI the past three days and has been given Rocephin for tx which has not improved his WBC. Per patient chart, WBC and BUN/Cr levels were 22.65 and 40/2.84 on 5/22 and 23.12 and 52/2.2 on 5/23. The patient is unable to verbalize any active complaints at this time, his wife at bedside reports that he seems weaker and to be ambulating less often compared to his baseline. CT Chest No Cont was done and showed   Trace bibasilar pleural effusions with dependent atelectasis. No focal   consolidation.  Cholelithiasis with distended gallbladder. Correlate with symptomatology.   If warranted right upper quadrant ultrasound can be obtained.  Bladder wall thickening with perivesicular stranding suggestive of   cystitis.  Bilateral nonspecific perinephric stranding may also be seen in the   setting of UTI.  No obstructive uropathy.  Small rectal fecal impaction with associated mild stercoral proctitis  Admitted for septic workup ,iv hydration ,iv abx

## 2024-05-27 NOTE — PROGRESS NOTE ADULT - ASSESSMENT
REASON FOR VISIT  .. Management of problems listed below      ROS  . Patient unable to give ROS     PHYSICAL EXAM    HEENT Unremarkable  atraumatic   RESP Fair air entry  Harsh breath sound   CARDIAC S1 S2 No S3     NO JVD    ABDOMEN No hepatosplenomegaly   PEDAL EDEMA present No calf tenderness  NO rash       GENERAL DATA .   GOC.    ..  prior   ICU STAY.    .. no   COVID.   .. 5/24/2024 scv2 (-)   BEST TRACTICE ISSUES.  . HOB ELEVATN.  .. Yes  . DVT PPLX.  5/24/2024 HPSC   . STRESS ULCER PPLX. 5/24/2024 PROTONIX 40   . DIET.  5/24/2024 NPO   . IV FLUIDS...... 5/24/2024 NS 50     ALLGY.   .. pncl      WT.   ..  5/24/2024 86  BMI.   .. 5/24/2024 27     ABGS.   .  VS/ PO/IO/ VENT/ DRIPS.  5/27/2024 99f 64 110/60   5/27/2024 ra 91%      . CC .   . 5/24/2024 BIBA from [facility] for continuing elevated WBC d/t UTI- despite antibiotics and today noted to have increased BUN and Cr. Hx dementia, DNR/DNI with limited interventions as per facility paperwork.    PATIENT DATA.  RESP.   INFECTION.  …. Esr 5/24/2024 esr 37   …. W 5/24-5/25-5/27/2024 w 20 - 12 - 7.2   .... ua 5/24/2024 ua w 50 r 50 bact moder   .... pr 5/24-5/25/2024 pr 19-8.2    .... ct cap 5/24/2024   ........ Tr bl effsns with dependent atelectasis   ........ cholelithiasis and distended gb   ........ bladder wall thickening and perivesicular stranding   ........ small rectal fecal impaction with mild stercoral proctitis   .... rvp 5/24/2024 (-)   .... bc 5/24 (-)   .... uc 5/24 10-49 E coli  ..... 5/24/2024 ERTAPENEM X 3D   CARDIAC.  .... 5/24/2024 ASA 81   .... 5/24/2024 PLAVX 75  .... 5/24/2024 ATORVASTAT 80    HEMAT.  …. Hb 5/24-5/25/2024 Hb 12.4 - 11.3   .... Plt 5/24/2024 Plt 93   .... INR 5/24/2024 .89   GI.  .... LFTS 5/24/2024   ........   ........ AST 70  ........ ALT 38   ..... 5/24/2024 MIRALAX   .... 5/24/2024 MESCALAMIN 1000   RENAL.  .... Na 5/24-5/27/2024 Na 142 - 147   .... K 5/24/2024 K 4.1   .... CO2 5/24/2024 co2 26  .... Cr 5/24-5/25/2024 Cr 1.4-1.1   .... 5/24/2024 TAMSULOSIN .4   ENDO.  .... 5/24/2024 LEVOXYL 75   NEURO.  .... 5/24/2024 BUSPAR 5.3   .... 5/24/2024 LEXAPRO 75   .... 5/24/2024 MEMANTINE 10.2   SKIN.    . BRIEFLY, .      . 5/24/2024 77 yo male with PMH of hypothyroidism, hld, atherosclerotic heart disease, Meier's Palsy, MDD, Alzheimer's disease, BPH, and peripheral vascular disease who was brought in from [facility] for elevated WBC and BUN/Cr levels. The patient has been diagnosed with UTI the past three days and has been given Rocephin for tx which has not improved his WBC. Per patient chart, WBC and BUN/Cr levels were 22.65 and 40/2.84 on 5/22 and 23.12 and 52/2.2 on 5/23. The patient is unable to verbalize any active complaints at this time, his wife at bedside reports that he seems weaker and to be ambulating less often compared to his baseline.     . COURSE   .... 5/24/2024  Started on ertapenem for presumed uti     . VISIT HISTORY .  PAST MEDICAL HISTORY:  CAD (coronary artery disease)   HLD (hyperlipidemia)   HTN (hypertension)   Hypothyroid   Dementia  Memory loss   Myocardial infarction 2002,2006  JILLIAN on CPAP   Other secondary osteoarthritis of left knee   PAD (peripheral artery disease).   BPH (benign prostatic hyperplasia)     PAST SURGICAL HISTORY:  Coronary angioplasty status PCI with stents 2002 LAD,  2006 x 1  History of left knee replacement   S/P arthroscopy of left knee 2014  Status post arterial stent.  . SIGNIFICANT HOME MEDS.   · aspirin 81 mg oral tablet, chewable: 1 tab(s) orally once a day  · pantoprazole 40 mg oral delayed release tablet: 1 tab(s) orally once a day (before a meal)  · senna oral tablet: 2 tab(s) orally once a day (at bedtime)  · docusate sodium 100 mg oral capsule: 1 cap(s) orally 3 times a day  · aspirin 81 mg oral delayed release tablet: 1 tab(s) orally 2 times a day x 40 more days  · tamsulosin 0.4 mg oral capsule: 1 cap(s) orally once a day (at bedtime)  · oxyCODONE 10 mg oral tablet: 1 tab(s) orally every 3 hours, As needed, Moderate Pain (4 - 6)  · oxyCODONE 5 mg oral tablet: 1 tab(s) orally every 3 hours, As needed, Mild Pain (1 - 3)  · acetaminophen 500 mg oral tablet: 2 tab(s) orally every 12 hours  · Rapaflo 8 mg oral capsule: 1 cap(s) orally once a day (at bedtime)  · memantine 10 mg oral tablet: 1 tab(s) orally once a day  · rosuvastatin 20 mg oral capsule: 1 tab(s) orally once a day  · Detrol: 4 milligram(s) orally once a day  · Aricept 10 mg oral tablet: 1 tab(s) orally once a day (at bedtime)  · Multi Vitamin+: 1 tab(s) orally  · Fish Oil oral capsule: 1 tab(s) orally once a day  · Vitamin B12 50 mcg oral tablet: 1 tab(s) orally once a day  · atenolol 25 mg oral tablet: 1 tab(s) orally once a day  · memantine 5 mg oral tablet: 1 tab(s) orally once a day (at bedtime)  · memantine 10 mg oral tablet: orally 2 times a day  · Crestor 20 mg oral tablet: 1 tab(s) orally once a day (at bedtime)  · escitalopram 5 mg oral tablet: 0.5 tab(s) orally once a day (in the morning)  · Plavix 75 mg oral tablet: 1 tab(s) orally once a day  · clopidogrel 75 mg oral tablet: 1 tab(s) orally once a day  · Fleet Enema 19 g-7 g rectal enema: 133 milliliter(s) rectally once a day as needed for  constipation  · tamsulosin 0.4 mg oral capsule: 1 cap(s) orally once a day  · bisacodyl 10 mg rectal suppository: 1 suppository(ies) rectally once a day as needed for  constipation  · levothyroxine 75 mcg (0.075 mg) oral tablet: 1 tab(s) orally once a day  · levothyroxine 75 mcg (0.075 mg) oral tablet: 1 tab(s) orally once a day    . DEVICES/TUBES/DECUBS/PROCEDURES.    . PROBLEM/PLAN.    . SEPSIS 5/24/2024   . UTI 5/24/2024   . MILD STERCORAL PROCTITIS 5/24/2024 CT   …. Esr 5/24/2024 esr 37   …. W 5/24/2024 w 20   .... ua 5/24/2024 ua w 50 r 50 bact moder   .... pr 5/24/2024 pr 19   .... ct cap 5/24/2024   ........ Tr bl effsns with dependent atelectasis   ........ cholelithiasis and distended gb   ........ bladder wall thickening and perivesicular stranding   ........ small rectal fecal impaction with mild stercoral proctitis   .... rvp 5/24/2024 (-)   ..... 5/24/2024 ERTAPENEM X 3D     . ATELECTASIS 5/24/2024 CT     . CAD.   .... 5/24/2024 ASA 81   .... 5/24/2024 PLAVX 75     . THROMBOCYTOPENIA 5/24/2024 [PLT 93   .... monitor     . RECTAL FECAL IMPACTN 5/24/2024 CT   .... on laxativ    . ELEVATED LFTS 5/24/2024 ALT 70   .... monitor     . CAROLA 5/24/2024 CR 1.4  ..... Fluids monitor     . HYPOTHYROID.  . ALZHEIMERS     . CURRENT ISSUES.  . SEPSIS 5/24/2024   . UTI 5/24/2024 10-49 E coli   . MILD STERCORAL PROCTITIS 5/24/2024 CT   . ATELECTASIS 5/24/2024 CT   . TRACE BILATERAL PLEURAL EFFUSNS 5/24/2024 CT   . CAD.   . THROMBOCYTOPENIA 5/24/2024 [PLT 93   . RECTAL FECAL IMPACTN 5/24/2024 CT   . ELEVATED LFTS 5/24/2024 ALT 70   . CHOLELITHIASIS AND DISTENDED GALLBLADDER 5/24/2024 CT -5/26 US (-)   . CAROLA 5/24-5/25/2024 CR 1.4-1.1  . HYPOTHYROID.  . ALZHEIMERS   . PENICILLIN ALLERGY     . DISPOSN.  .... On 5/24 3 d course ertapenem staretd for UTI 5/24 10-49 E coli       TIME SPENT.  . Over 36 minutes aggregate care time spent on encounter; activities included   direct patient care, counseling and/or coordinating care reviewing notes, lab data/ imaging , discussion with multidisciplinary team/ patient  /family and explaining in detail risks, benefits, alternatives  of the recommendations     PATIENT.  . MASTER CARLITOS 76 m 5/24/2024 1947 DR SINA ELY

## 2024-05-27 NOTE — PROGRESS NOTE ADULT - PROBLEM SELECTOR PLAN 6
Stercoral colitis  Fecal retention  Bowel regimen  Mesalamine suppository qhs  Monitor for BM

## 2024-05-27 NOTE — PROGRESS NOTE ADULT - PROBLEM SELECTOR PLAN 2
Cholelithiasis  CT noted  LFTs grossly within normal limits; trend  U/S noted  GI f/u
Cholelithiasis  CT noted  LFTs grossly within normal limits; trend  U/S noted  GI f/u
Cholelithiasis  CT noted  LFTs grossly within normal limits; trend  Follow up RUQ US  GI f/u

## 2024-05-27 NOTE — PROGRESS NOTE ADULT - PROBLEM SELECTOR PLAN 3
Likely 2/2 to UTI  Improving  Continue to trend

## 2024-05-27 NOTE — PROGRESS NOTE ADULT - PROBLEM SELECTOR PLAN 1
-continue ertapenem  -follow urine C&S  -blood cultures NTD  -WBC normal  -ID f/u
-continue ertapenem  -follow blood and urine cultures  -monitor WBC, LFT's  -ID f/u
-continue ertapenem  -follow urine C&S  -blood cultures NTD  -WBC normal  -ID f/u

## 2024-05-28 VITALS
OXYGEN SATURATION: 92 % | DIASTOLIC BLOOD PRESSURE: 65 MMHG | SYSTOLIC BLOOD PRESSURE: 133 MMHG | HEART RATE: 66 BPM | TEMPERATURE: 98 F | RESPIRATION RATE: 18 BRPM

## 2024-05-28 LAB
-  AMOXICILLIN/CLAVULANIC ACID: SIGNIFICANT CHANGE UP
-  AMPICILLIN/SULBACTAM: SIGNIFICANT CHANGE UP
-  AMPICILLIN: SIGNIFICANT CHANGE UP
-  AZTREONAM: SIGNIFICANT CHANGE UP
-  CEFAZOLIN: SIGNIFICANT CHANGE UP
-  CEFEPIME: SIGNIFICANT CHANGE UP
-  CEFOXITIN: SIGNIFICANT CHANGE UP
-  CEFTRIAXONE: SIGNIFICANT CHANGE UP
-  CEFUROXIME: SIGNIFICANT CHANGE UP
-  CIPROFLOXACIN: SIGNIFICANT CHANGE UP
-  ERTAPENEM: SIGNIFICANT CHANGE UP
-  GENTAMICIN: SIGNIFICANT CHANGE UP
-  IMIPENEM: SIGNIFICANT CHANGE UP
-  LEVOFLOXACIN: SIGNIFICANT CHANGE UP
-  MEROPENEM: SIGNIFICANT CHANGE UP
-  NITROFURANTOIN: SIGNIFICANT CHANGE UP
-  PIPERACILLIN/TAZOBACTAM: SIGNIFICANT CHANGE UP
-  TOBRAMYCIN: SIGNIFICANT CHANGE UP
-  TRIMETHOPRIM/SULFAMETHOXAZOLE: SIGNIFICANT CHANGE UP
ALBUMIN SERPL ELPH-MCNC: 2.1 G/DL — LOW (ref 3.3–5)
ALP SERPL-CCNC: 146 U/L — HIGH (ref 40–120)
ALT FLD-CCNC: 75 U/L — SIGNIFICANT CHANGE UP (ref 12–78)
ANION GAP SERPL CALC-SCNC: 4 MMOL/L — LOW (ref 5–17)
AST SERPL-CCNC: 71 U/L — HIGH (ref 15–37)
BILIRUB DIRECT SERPL-MCNC: 0.1 MG/DL — SIGNIFICANT CHANGE UP (ref 0–0.3)
BILIRUB INDIRECT FLD-MCNC: 0.4 MG/DL — SIGNIFICANT CHANGE UP (ref 0.2–1)
BILIRUB SERPL-MCNC: 0.5 MG/DL — SIGNIFICANT CHANGE UP (ref 0.2–1.2)
BUN SERPL-MCNC: 22 MG/DL — SIGNIFICANT CHANGE UP (ref 7–23)
CALCIUM SERPL-MCNC: 8.4 MG/DL — LOW (ref 8.5–10.1)
CHLORIDE SERPL-SCNC: 112 MMOL/L — HIGH (ref 96–108)
CO2 SERPL-SCNC: 28 MMOL/L — SIGNIFICANT CHANGE UP (ref 22–31)
CREAT SERPL-MCNC: 0.85 MG/DL — SIGNIFICANT CHANGE UP (ref 0.5–1.3)
CULTURE RESULTS: ABNORMAL
EGFR: 90 ML/MIN/1.73M2 — SIGNIFICANT CHANGE UP
GLUCOSE SERPL-MCNC: 178 MG/DL — HIGH (ref 70–99)
HCT VFR BLD CALC: 33.9 % — LOW (ref 39–50)
HGB BLD-MCNC: 11.2 G/DL — LOW (ref 13–17)
MAGNESIUM SERPL-MCNC: 2.1 MG/DL — SIGNIFICANT CHANGE UP (ref 1.6–2.6)
MCHC RBC-ENTMCNC: 30.9 PG — SIGNIFICANT CHANGE UP (ref 27–34)
MCHC RBC-ENTMCNC: 33 GM/DL — SIGNIFICANT CHANGE UP (ref 32–36)
MCV RBC AUTO: 93.4 FL — SIGNIFICANT CHANGE UP (ref 80–100)
METHOD TYPE: SIGNIFICANT CHANGE UP
NRBC # BLD: 0 /100 WBCS — SIGNIFICANT CHANGE UP (ref 0–0)
ORGANISM # SPEC MICROSCOPIC CNT: ABNORMAL
ORGANISM # SPEC MICROSCOPIC CNT: SIGNIFICANT CHANGE UP
PLATELET # BLD AUTO: 147 K/UL — LOW (ref 150–400)
POTASSIUM SERPL-MCNC: 3.5 MMOL/L — SIGNIFICANT CHANGE UP (ref 3.5–5.3)
POTASSIUM SERPL-SCNC: 3.5 MMOL/L — SIGNIFICANT CHANGE UP (ref 3.5–5.3)
PROT SERPL-MCNC: 5.6 G/DL — LOW (ref 6–8.3)
RBC # BLD: 3.63 M/UL — LOW (ref 4.2–5.8)
RBC # FLD: 13.6 % — SIGNIFICANT CHANGE UP (ref 10.3–14.5)
SODIUM SERPL-SCNC: 144 MMOL/L — SIGNIFICANT CHANGE UP (ref 135–145)
SPECIMEN SOURCE: SIGNIFICANT CHANGE UP
WBC # BLD: 7.28 K/UL — SIGNIFICANT CHANGE UP (ref 3.8–10.5)
WBC # FLD AUTO: 7.28 K/UL — SIGNIFICANT CHANGE UP (ref 3.8–10.5)

## 2024-05-28 PROCEDURE — 99285 EMERGENCY DEPT VISIT HI MDM: CPT | Mod: 25

## 2024-05-28 PROCEDURE — 87186 SC STD MICRODIL/AGAR DIL: CPT

## 2024-05-28 PROCEDURE — 0225U NFCT DS DNA&RNA 21 SARSCOV2: CPT

## 2024-05-28 PROCEDURE — 96374 THER/PROPH/DIAG INJ IV PUSH: CPT

## 2024-05-28 PROCEDURE — 74176 CT ABD & PELVIS W/O CONTRAST: CPT | Mod: MC

## 2024-05-28 PROCEDURE — 85730 THROMBOPLASTIN TIME PARTIAL: CPT

## 2024-05-28 PROCEDURE — 76705 ECHO EXAM OF ABDOMEN: CPT

## 2024-05-28 PROCEDURE — 80048 BASIC METABOLIC PNL TOTAL CA: CPT

## 2024-05-28 PROCEDURE — 86140 C-REACTIVE PROTEIN: CPT

## 2024-05-28 PROCEDURE — 94660 CPAP INITIATION&MGMT: CPT

## 2024-05-28 PROCEDURE — 85025 COMPLETE CBC W/AUTO DIFF WBC: CPT

## 2024-05-28 PROCEDURE — 94760 N-INVAS EAR/PLS OXIMETRY 1: CPT

## 2024-05-28 PROCEDURE — 87040 BLOOD CULTURE FOR BACTERIA: CPT

## 2024-05-28 PROCEDURE — 87086 URINE CULTURE/COLONY COUNT: CPT

## 2024-05-28 PROCEDURE — 85610 PROTHROMBIN TIME: CPT

## 2024-05-28 PROCEDURE — 83735 ASSAY OF MAGNESIUM: CPT

## 2024-05-28 PROCEDURE — 85027 COMPLETE CBC AUTOMATED: CPT

## 2024-05-28 PROCEDURE — 85652 RBC SED RATE AUTOMATED: CPT

## 2024-05-28 PROCEDURE — 36415 COLL VENOUS BLD VENIPUNCTURE: CPT

## 2024-05-28 PROCEDURE — 84443 ASSAY THYROID STIM HORMONE: CPT

## 2024-05-28 PROCEDURE — 84145 PROCALCITONIN (PCT): CPT

## 2024-05-28 PROCEDURE — 80076 HEPATIC FUNCTION PANEL: CPT

## 2024-05-28 PROCEDURE — 99232 SBSQ HOSP IP/OBS MODERATE 35: CPT

## 2024-05-28 PROCEDURE — 80053 COMPREHEN METABOLIC PANEL: CPT

## 2024-05-28 PROCEDURE — 71250 CT THORAX DX C-: CPT | Mod: MC

## 2024-05-28 PROCEDURE — 81001 URINALYSIS AUTO W/SCOPE: CPT

## 2024-05-28 PROCEDURE — 83605 ASSAY OF LACTIC ACID: CPT

## 2024-05-28 PROCEDURE — 97162 PT EVAL MOD COMPLEX 30 MIN: CPT

## 2024-05-28 PROCEDURE — 93005 ELECTROCARDIOGRAM TRACING: CPT

## 2024-05-28 RX ORDER — CEFTRIAXONE 500 MG/1
1 INJECTION, POWDER, FOR SOLUTION INTRAMUSCULAR; INTRAVENOUS
Refills: 0 | DISCHARGE

## 2024-05-28 RX ORDER — CEFPODOXIME PROXETIL 100 MG
200 TABLET ORAL EVERY 12 HOURS
Refills: 0 | Status: DISCONTINUED | OUTPATIENT
Start: 2024-05-28 | End: 2024-05-28

## 2024-05-28 RX ORDER — MESALAMINE 400 MG
1 TABLET, DELAYED RELEASE (ENTERIC COATED) ORAL
Qty: 0 | Refills: 0 | DISCHARGE
Start: 2024-05-28

## 2024-05-28 RX ORDER — CEFPODOXIME PROXETIL 100 MG
1 TABLET ORAL
Qty: 0 | Refills: 0 | DISCHARGE
Start: 2024-05-28

## 2024-05-28 RX ORDER — LANOLIN/MINERAL OIL
1 LOTION (ML) TOPICAL
Refills: 0 | DISCHARGE

## 2024-05-28 RX ORDER — MAGNESIUM HYDROXIDE 400 MG/1
30 TABLET, CHEWABLE ORAL
Refills: 0 | DISCHARGE

## 2024-05-28 RX ADMIN — HEPARIN SODIUM 5000 UNIT(S): 5000 INJECTION INTRAVENOUS; SUBCUTANEOUS at 17:01

## 2024-05-28 RX ADMIN — Medication 5 MILLIGRAM(S): at 05:08

## 2024-05-28 RX ADMIN — Medication 200 MILLIGRAM(S): at 17:00

## 2024-05-28 RX ADMIN — Medication 200 MILLIGRAM(S): at 11:36

## 2024-05-28 RX ADMIN — CLOPIDOGREL BISULFATE 75 MILLIGRAM(S): 75 TABLET, FILM COATED ORAL at 11:31

## 2024-05-28 RX ADMIN — POLYETHYLENE GLYCOL 3350 17 GRAM(S): 17 POWDER, FOR SOLUTION ORAL at 05:08

## 2024-05-28 RX ADMIN — MEMANTINE HYDROCHLORIDE 10 MILLIGRAM(S): 10 TABLET ORAL at 17:01

## 2024-05-28 RX ADMIN — Medication 75 MICROGRAM(S): at 05:08

## 2024-05-28 RX ADMIN — Medication 5 MILLIGRAM(S): at 13:09

## 2024-05-28 RX ADMIN — MEMANTINE HYDROCHLORIDE 10 MILLIGRAM(S): 10 TABLET ORAL at 05:08

## 2024-05-28 RX ADMIN — PANTOPRAZOLE SODIUM 40 MILLIGRAM(S): 20 TABLET, DELAYED RELEASE ORAL at 08:06

## 2024-05-28 RX ADMIN — Medication 1 TABLET(S): at 05:08

## 2024-05-28 RX ADMIN — Medication 81 MILLIGRAM(S): at 12:08

## 2024-05-28 RX ADMIN — Medication 1 TABLET(S): at 17:00

## 2024-05-28 RX ADMIN — ESCITALOPRAM OXALATE 7.5 MILLIGRAM(S): 10 TABLET, FILM COATED ORAL at 11:30

## 2024-05-28 RX ADMIN — HEPARIN SODIUM 5000 UNIT(S): 5000 INJECTION INTRAVENOUS; SUBCUTANEOUS at 05:08

## 2024-05-28 RX ADMIN — POLYETHYLENE GLYCOL 3350 17 GRAM(S): 17 POWDER, FOR SOLUTION ORAL at 17:01

## 2024-05-28 NOTE — PROGRESS NOTE ADULT - SUBJECTIVE AND OBJECTIVE BOX
CHIEF COMPLAINT/ REASON FOR VISIT  .. Patient was seen to address the  issue listed under PROBLEM LIST which is located toward bottom of this note     CARLITOS MASTER    PLV 2EAS 217 D1    Allergies    penicillin (Hives; Urticaria)  penicillin (Swelling; Rash; Hives)    Intolerances        PAST MEDICAL & SURGICAL HISTORY:  JILLIAN on CPAP      Myocardial infarction  2002,2006      BPH (benign prostatic hyperplasia)      Memory loss      Other secondary osteoarthritis of left knee      CAD (coronary artery disease)      Hypothyroid      Essential hypertension      HLD (hyperlipidemia)      Dementia      PAD (peripheral artery disease)      HTN (hypertension)      HLD (hyperlipidemia)      S/P arthroscopy of left knee  2014      Coronary angioplasty status  PCI with stents 2002 LAD,  2006 x 1      Status post arterial stent      History of left knee replacement          FAMILY HISTORY:  Family history of lung cancer (Mother)  mother        Home Medications:  Aquaphor topical ointment: Apply topically to affected area 2 times a day (24 May 2024 12:31)  aspirin 81 mg oral tablet, chewable: 1 tab(s) chewed once a day (24 May 2024 15:20)  Bacid oral capsule: 1 cap(s) orally 2 times a day until 5/28/24 (24 May 2024 15:20)  busPIRone 5 mg oral tablet: 1 tab(s) orally 3 times a day (24 May 2024 15:20)  cefTRIAXone 1 g injection: 1 gram(s) intravenously 2 times a day for prophylaxis until 5/28/24 (started 5/23 AM) (24 May 2024 15:20)  clopidogrel 75 mg oral tablet: 1 tab(s) orally once a day (24 May 2024 15:20)  Dulcolax Laxative 10 mg rectal suppository: 1 suppository(ies) rectally as needed for  constipation (24 May 2024 15:20)  escitalopram 5 mg oral tablet: 1.5 tab(s) orally once a day (24 May 2024 15:20)  Fleet Enema 19 g-7 g rectal enema: 133 milliliter(s) rectally as needed for  constipation (24 May 2024 15:20)  levothyroxine 75 mcg (0.075 mg) oral tablet: 1 tab(s) orally once a day (24 May 2024 15:20)  melatonin 3 mg oral tablet: 2 tab(s) orally once a day (at bedtime) (24 May 2024 15:20)  memantine 10 mg oral tablet: 1 tab(s) orally 2 times a day (24 May 2024 15:20)  Milk of Magnesia 1200 mg/15 mL oral liquid: 30 milliliter(s) orally once a day as needed for  constipation (24 May 2024 15:20)  rosuvastatin 20 mg oral tablet: 1 tab(s) orally once a day (at bedtime) (24 May 2024 15:20)  tamsulosin 0.4 mg oral capsule: 1 cap(s) orally once a day (in the evening) (24 May 2024 15:20)  Tylenol 500 mg oral tablet: 2 tab(s) orally 3 times a day (24 May 2024 15:20)      MEDICATIONS  (STANDING):  aspirin  chewable 81 milliGRAM(s) Oral daily  atorvastatin 80 milliGRAM(s) Oral at bedtime  busPIRone 5 milliGRAM(s) Oral three times a day  clopidogrel Tablet 75 milliGRAM(s) Oral daily  ertapenem  IVPB 1000 milliGRAM(s) IV Intermittent every 24 hours  escitalopram 7.5 milliGRAM(s) Oral daily  heparin   Injectable 5000 Unit(s) SubCutaneous every 12 hours  lactobacillus acidophilus 1 Tablet(s) Oral every 12 hours  levothyroxine 75 MICROGram(s) Oral daily  melatonin 5 milliGRAM(s) Oral at bedtime  memantine 10 milliGRAM(s) Oral two times a day  mesalamine Suppository 1000 milliGRAM(s) Rectal at bedtime  pantoprazole    Tablet 40 milliGRAM(s) Oral before breakfast  polyethylene glycol 3350 17 Gram(s) Oral two times a day  senna 2 Tablet(s) Oral at bedtime  sodium chloride 0.9%. 1000 milliLiter(s) (50 mL/Hr) IV Continuous <Continuous>  tamsulosin 0.4 milliGRAM(s) Oral at bedtime    MEDICATIONS  (PRN):  acetaminophen     Tablet .. 650 milliGRAM(s) Oral every 6 hours PRN Temp greater or equal to 38C (100.4F), Mild Pain (1 - 3)  aluminum hydroxide/magnesium hydroxide/simethicone Suspension 30 milliLiter(s) Oral every 4 hours PRN Dyspepsia  bisacodyl Suppository 10 milliGRAM(s) Rectal daily PRN Constipation  magnesium hydroxide Suspension 30 milliLiter(s) Oral daily PRN Constipation  ondansetron Injectable 4 milliGRAM(s) IV Push every 8 hours PRN Nausea and/or Vomiting      Diet, Regular:   Supplement Feeding Modality:  Oral  Ensure Plus High Protein Cans or Servings Per Day:  1       Frequency:  Two Times a day (05-25-24 @ 13:36) [Active]          Vital Signs Last 24 Hrs  T(C): 36.9 (26 May 2024 04:09), Max: 37 (25 May 2024 12:17)  T(F): 98.5 (26 May 2024 04:09), Max: 98.6 (25 May 2024 12:17)  HR: 74 (26 May 2024 07:32) (70 - 74)  BP: 148/76 (26 May 2024 04:09) (119/68 - 148/76)  BP(mean): --  RR: 18 (26 May 2024 04:09) (18 - 18)  SpO2: 97% (26 May 2024 07:32) (92% - 97%)    Parameters below as of 26 May 2024 04:09  Patient On (Oxygen Delivery Method): BiPAP/CPAP          05-25-24 @ 07:01  -  05-26-24 @ 07:00  --------------------------------------------------------  IN: 0 mL / OUT: 2600 mL / NET: -2600 mL              LABS:                        11.6   7.95  )-----------( 95       ( 26 May 2024 08:52 )             34.5     05-26    146<H>  |  114<H>  |  28<H>  ----------------------------<  114<H>  3.7   |  27  |  0.96    Ca    8.7      26 May 2024 08:52  Mg     2.1     05-26    TPro  5.5<L>  /  Alb  2.2<L>  /  TBili  0.6  /  DBili  x   /  AST  66<H>  /  ALT  46  /  AlkPhos  149<H>  05-25    PT/INR - ( 25 May 2024 07:27 )   PT: 12.1 sec;   INR: 1.04 ratio           Urinalysis Basic - ( 26 May 2024 08:52 )    Color: x / Appearance: x / SG: x / pH: x  Gluc: 114 mg/dL / Ketone: x  / Bili: x / Urobili: x   Blood: x / Protein: x / Nitrite: x   Leuk Esterase: x / RBC: x / WBC x   Sq Epi: x / Non Sq Epi: x / Bacteria: x            WBC:  WBC Count: 7.95 K/uL (05-26 @ 08:52)  WBC Count: 12.62 K/uL (05-25 @ 07:27)  WBC Count: 20.43 K/uL (05-24 @ 10:30)      MICROBIOLOGY:  RECENT CULTURES:  05-24 Catheterized Catheterized XXXX XXXX   10,000 - 49,000 CFU/mL Escherichia coli    05-24 .Blood Blood-Venous XXXX XXXX   No growth at 24 hours    05-24 .Blood Blood-Venous XXXX XXXX   No growth at 24 hours                PT/INR - ( 25 May 2024 07:27 )   PT: 12.1 sec;   INR: 1.04 ratio             Sodium:  Sodium: 146 mmol/L (05-26 @ 08:52)  Sodium: 145 mmol/L (05-25 @ 07:27)  Sodium: 142 mmol/L (05-24 @ 10:30)      0.96 mg/dL 05-26 @ 08:52  1.10 mg/dL 05-25 @ 07:27  1.40 mg/dL 05-24 @ 10:30      Hemoglobin:  Hemoglobin: 11.6 g/dL (05-26 @ 08:52)  Hemoglobin: 11.3 g/dL (05-25 @ 07:27)  Hemoglobin: 12.4 g/dL (05-24 @ 10:30)      Platelets: Platelet Count - Automated: 95 K/uL (05-26 @ 08:52)  Platelet Count - Automated: 94 K/uL (05-25 @ 07:27)  Platelet Count - Automated: 93 K/uL (05-24 @ 10:30)      LIVER FUNCTIONS - ( 25 May 2024 07:27 )  Alb: 2.2 g/dL / Pro: 5.5 g/dL / ALK PHOS: 149 U/L / ALT: 46 U/L / AST: 66 U/L / GGT: x             Urinalysis Basic - ( 26 May 2024 08:52 )    Color: x / Appearance: x / SG: x / pH: x  Gluc: 114 mg/dL / Ketone: x  / Bili: x / Urobili: x   Blood: x / Protein: x / Nitrite: x   Leuk Esterase: x / RBC: x / WBC x   Sq Epi: x / Non Sq Epi: x / Bacteria: x        RADIOLOGY & ADDITIONAL STUDIES:      MICROBIOLOGY:  RECENT CULTURES:  05-24 Catheterized Catheterized XXXX XXXX   10,000 - 49,000 CFU/mL Escherichia coli    05-24 .Blood Blood-Venous XXXX XXXX   No growth at 24 hours    05-24 .Blood Blood-Venous XXXX XXXX   No growth at 24 hours            
  Patient is a 78y Male whom presented to the hospital with mally     PAST MEDICAL & SURGICAL HISTORY:  JILLIAN on CPAP      Myocardial infarction  2002,2006      BPH (benign prostatic hyperplasia)      Memory loss      Other secondary osteoarthritis of left knee      CAD (coronary artery disease)      Hypothyroid      Essential hypertension      HLD (hyperlipidemia)      Dementia      PAD (peripheral artery disease)      HTN (hypertension)      HLD (hyperlipidemia)      S/P arthroscopy of left knee  2014      Coronary angioplasty status  PCI with stents 2002 LAD,  2006 x 1      Status post arterial stent      History of left knee replacement          MEDICATIONS  (STANDING):  aspirin  chewable 81 milliGRAM(s) Oral daily  atorvastatin 80 milliGRAM(s) Oral at bedtime  busPIRone 5 milliGRAM(s) Oral three times a day  clopidogrel Tablet 75 milliGRAM(s) Oral daily  escitalopram 7.5 milliGRAM(s) Oral daily  heparin   Injectable 5000 Unit(s) SubCutaneous every 12 hours  lactobacillus acidophilus 1 Tablet(s) Oral every 12 hours  levothyroxine 75 MICROGram(s) Oral daily  melatonin 5 milliGRAM(s) Oral at bedtime  memantine 10 milliGRAM(s) Oral two times a day  mesalamine Suppository 1000 milliGRAM(s) Rectal at bedtime  pantoprazole    Tablet 40 milliGRAM(s) Oral before breakfast  polyethylene glycol 3350 17 Gram(s) Oral two times a day  senna 2 Tablet(s) Oral at bedtime  sodium chloride 0.9%. 1000 milliLiter(s) (50 mL/Hr) IV Continuous <Continuous>  tamsulosin 0.4 milliGRAM(s) Oral at bedtime      Allergies    penicillin (Hives; Urticaria)  penicillin (Swelling; Rash; Hives)    Intolerances        SOCIAL HISTORY:  Denies ETOh,Smoking,     FAMILY HISTORY:  Family history of lung cancer (Mother)  mother          REVIEW OF SYSTEMS:    unable to obtained a good review system                                       11.2   7.28  )-----------( 147      ( 28 May 2024 06:51 )             33.9       CBC Full  -  ( 28 May 2024 06:51 )  WBC Count : 7.28 K/uL  RBC Count : 3.63 M/uL  Hemoglobin : 11.2 g/dL  Hematocrit : 33.9 %  Platelet Count - Automated : 147 K/uL  Mean Cell Volume : 93.4 fl  Mean Cell Hemoglobin : 30.9 pg  Mean Cell Hemoglobin Concentration : 33.0 gm/dL  Auto Neutrophil # : x  Auto Lymphocyte # : x  Auto Monocyte # : x  Auto Eosinophil # : x  Auto Basophil # : x  Auto Neutrophil % : x  Auto Lymphocyte % : x  Auto Monocyte % : x  Auto Eosinophil % : x  Auto Basophil % : x      05-28    144  |  112<H>  |  22  ----------------------------<  178<H>  3.5   |  28  |  0.85    Ca    8.4<L>      28 May 2024 06:51  Mg     2.1     05-28    TPro  5.6<L>  /  Alb  2.1<L>  /  TBili  0.5  /  DBili  0.1  /  AST  71<H>  /  ALT  75  /  AlkPhos  146<H>  05-28      CAPILLARY BLOOD GLUCOSE          Vital Signs Last 24 Hrs  T(C): 36.7 (28 May 2024 11:31), Max: 37.2 (27 May 2024 20:04)  T(F): 98 (28 May 2024 11:31), Max: 98.9 (27 May 2024 20:04)  HR: 66 (28 May 2024 11:31) (61 - 72)  BP: 133/65 (28 May 2024 11:31) (126/72 - 171/87)  BP(mean): --  RR: 18 (28 May 2024 11:31) (18 - 18)  SpO2: 92% (28 May 2024 11:31) (92% - 98%)    Parameters below as of 28 May 2024 11:31  Patient On (Oxygen Delivery Method): room air        Urinalysis Basic - ( 28 May 2024 06:51 )    Color: x / Appearance: x / SG: x / pH: x  Gluc: 178 mg/dL / Ketone: x  / Bili: x / Urobili: x   Blood: x / Protein: x / Nitrite: x   Leuk Esterase: x / RBC: x / WBC x   Sq Epi: x / Non Sq Epi: x / Bacteria: x                   PHYSICAL EXAM:    Constitutional: NAD  HEENT: conjunctive   clear   Neck:  No JVD  Respiratory: CTAB  Cardiovascular: S1 and S2  Gastrointestinal: BS+, soft, NT/ND  Extremities: No peripheral edema  
[facility]  INFECTIOUS DISEASES - [address], [place] [address]  Tel: [phone]     Fax: [phone]  =======================================================    CARLITOS LOPEZ [id number]    Follow up: Seen earlier today. No fevers. Per wife at bedside, patient more alert today and ate his food.    Allergies:  penicillin (Hives; Urticaria)  penicillin (Swelling; Rash; Hives)      Antibiotics:  acetaminophen     Tablet .. 650 milliGRAM(s) Oral every 6 hours PRN  aluminum hydroxide/magnesium hydroxide/simethicone Suspension 30 milliLiter(s) Oral every 4 hours PRN  aspirin  chewable 81 milliGRAM(s) Oral daily  atorvastatin 80 milliGRAM(s) Oral at bedtime  bisacodyl Suppository 10 milliGRAM(s) Rectal daily PRN  busPIRone 5 milliGRAM(s) Oral three times a day  clopidogrel Tablet 75 milliGRAM(s) Oral daily  ertapenem  IVPB 1000 milliGRAM(s) IV Intermittent every 24 hours  escitalopram 7.5 milliGRAM(s) Oral daily  heparin   Injectable 5000 Unit(s) SubCutaneous every 12 hours  lactobacillus acidophilus 1 Tablet(s) Oral every 12 hours  levothyroxine 75 MICROGram(s) Oral daily  magnesium hydroxide Suspension 30 milliLiter(s) Oral daily PRN  melatonin 5 milliGRAM(s) Oral at bedtime  memantine 10 milliGRAM(s) Oral two times a day  mesalamine Suppository 1000 milliGRAM(s) Rectal at bedtime  ondansetron Injectable 4 milliGRAM(s) IV Push every 8 hours PRN  pantoprazole    Tablet 40 milliGRAM(s) Oral before breakfast  polyethylene glycol 3350 17 Gram(s) Oral two times a day  senna 2 Tablet(s) Oral at bedtime  tamsulosin 0.4 milliGRAM(s) Oral at bedtime       REVIEW OF SYSTEMS:  unable to assess 2/2 dementia     Physical Exam:  ICU Vital Signs Last 24 Hrs  T(C): 37.7 (26 May 2024 20:48), Max: 37.7 (26 May 2024 20:48)  T(F): 99.8 (26 May 2024 20:48), Max: 99.8 (26 May 2024 20:48)  HR: 77 (26 May 2024 20:48) (66 - 77)  BP: 121/62 (26 May 2024 20:48) (118/67 - 148/76)  BP(mean): --  ABP: --  ABP(mean): --  RR: 18 (26 May 2024 20:48) (18 - 19)  SpO2: 91% (26 May 2024 20:48) (91% - 98%)    O2 Parameters below as of 26 May 2024 20:48  Patient On (Oxygen Delivery Method): room air           GEN: Sitting up in chair, not in apparent distress  HEENT: normocephalic and atraumatic.   NECK: Supple.   LUNGS: Normal respiratory effort  HEART: Regular rate and rhythm  ABDOMEN: Soft, nontender, and nondistended.    EXTREMITIES: No leg edema.      Labs:  05-26    146<H>  |  114<H>  |  28<H>  ----------------------------<  114<H>  3.7   |  27  |  0.96    Ca    8.7      26 May 2024 08:52  Mg     2.1     05-26    TPro  5.8<L>  /  Alb  2.3<L>  /  TBili  0.6  /  DBili  0.2  /  AST  67<H>  /  ALT  54  /  AlkPhos  177<H>  05-26                          11.6   7.95  )-----------( 95       ( 26 May 2024 08:52 )             34.5     PT/INR - ( 25 May 2024 07:27 )   PT: 12.1 sec;   INR: 1.04 ratio           Urinalysis Basic - ( 26 May 2024 08:52 )    Color: x / Appearance: x / SG: x / pH: x  Gluc: 114 mg/dL / Ketone: x  / Bili: x / Urobili: x   Blood: x / Protein: x / Nitrite: x   Leuk Esterase: x / RBC: x / WBC x   Sq Epi: x / Non Sq Epi: x / Bacteria: x      LIVER FUNCTIONS - ( 26 May 2024 08:52 )  Alb: 2.3 g/dL / Pro: 5.8 g/dL / ALK PHOS: 177 U/L / ALT: 54 U/L / AST: 67 U/L / GGT: x             RECENT CULTURES:  05-24 @ 12:20 Catheterized Catheterized     10,000 - 49,000 CFU/mL Escherichia coli        05-24 @ 10:30 .Blood Blood-Venous     No growth at 48 Hours      NotDetec  05-24 @ 10:20 .Blood Blood-Venous     No growth at 48 Hours              All imaging and data are reviewed.     < from: US Abdomen Upper Quadrant Right (05.26.24 @ 10:41) >  FINDINGS:  Technically difficult and limited exam due to limited patient cooperation.    Liver: Within normal limits.  Bile ducts: Normal caliber. Common bile duct measures 7-8 mm.  Gallbladder: Cholelithiasis and sludge. Limited visualization without   evidence for significant wall thickening or pericholecystic edema. Per   the sonographer, no focal pain was elicited over the gallbladder during   the examination.  Pancreas: Not visualized.  Right kidney: 11.5 cm. No hydronephrosis. Upper pole renal sinus cyst   measuring 3.0 x 3.0 x 2.7 cm. Upper pole cortical cyst measuring 1.6 x   1.1 cm.  Ascites: None.  IVC: Not well visualized.    IMPRESSION:  *  Technically difficultand limited exam. Cholelithiasis and gallbladder   sludge. No definite sonographic evidence for acute cholecystitis.   Consider close follow-up as warranted.  *  The common bile duct measures up to 7-8 mm in caliber, which is top   normal for the patient's age.    < end of copied text >    
[facility]  INFECTIOUS DISEASES - [address], [place] [address]  Tel: [phone]     Fax: [phone]  =======================================================    CARLITOS LOPEZ [id number]    Follow up: Tmax of 100.1. Per daughter at bedside seems to be doing okay today. She says patient typically does not respond to questions appropriately.    Allergies:  penicillin (Hives; Urticaria)  penicillin (Swelling; Rash; Hives)      Antibiotics:  acetaminophen     Tablet .. 650 milliGRAM(s) Oral every 6 hours PRN  aluminum hydroxide/magnesium hydroxide/simethicone Suspension 30 milliLiter(s) Oral every 4 hours PRN  aspirin  chewable 81 milliGRAM(s) Oral daily  atorvastatin 80 milliGRAM(s) Oral at bedtime  bisacodyl Suppository 10 milliGRAM(s) Rectal daily PRN  busPIRone 5 milliGRAM(s) Oral three times a day  clopidogrel Tablet 75 milliGRAM(s) Oral daily  ertapenem  IVPB 1000 milliGRAM(s) IV Intermittent every 24 hours  escitalopram 7.5 milliGRAM(s) Oral daily  heparin   Injectable 5000 Unit(s) SubCutaneous every 12 hours  lactobacillus acidophilus 1 Tablet(s) Oral every 12 hours  levothyroxine 75 MICROGram(s) Oral daily  magnesium hydroxide Suspension 30 milliLiter(s) Oral daily PRN  melatonin 5 milliGRAM(s) Oral at bedtime  memantine 10 milliGRAM(s) Oral two times a day  mesalamine Suppository 1000 milliGRAM(s) Rectal at bedtime  ondansetron Injectable 4 milliGRAM(s) IV Push every 8 hours PRN  pantoprazole    Tablet 40 milliGRAM(s) Oral before breakfast  polyethylene glycol 3350 17 Gram(s) Oral two times a day  senna 2 Tablet(s) Oral at bedtime  sodium chloride 0.9%. 1000 milliLiter(s) IV Continuous <Continuous>  tamsulosin 0.4 milliGRAM(s) Oral at bedtime       REVIEW OF SYSTEMS:  unable to obtain 2/2 dementia     Physical Exam:  ICU Vital Signs Last 24 Hrs  T(C): 37 (25 May 2024 12:17), Max: 37.8 (24 May 2024 18:48)  T(F): 98.6 (25 May 2024 12:17), Max: 100.1 (24 May 2024 18:48)  HR: 71 (25 May 2024 12:17) (71 - 100)  BP: 119/68 (25 May 2024 12:17) (109/65 - 119/68)  BP(mean): --  ABP: --  ABP(mean): --  RR: 18 (25 May 2024 12:17) (18 - 18)  SpO2: 92% (25 May 2024 12:17) (91% - 96%)    O2 Parameters below as of 25 May 2024 12:17  Patient On (Oxygen Delivery Method): room air           GEN: Awake, not in apparent distress  HEENT: normocephalic and atraumatic.   NECK: Supple.   LUNGS: Normal respiratory effort  HEART: Regular rate and rhythm  ABDOMEN: Soft, nontender, and nondistended.    EXTREMITIES: No leg edema.  NEUROLOGIC: Not responding to questions appropriately    Labs:  05-25    145  |  115<H>  |  33<H>  ----------------------------<  86  3.7   |  26  |  1.10    Ca    8.3<L>      25 May 2024 07:27    TPro  5.5<L>  /  Alb  2.2<L>  /  TBili  0.6  /  DBili  x   /  AST  66<H>  /  ALT  46  /  AlkPhos  149<H>  05-25                          11.3   12.62 )-----------( 94       ( 25 May 2024 07:27 )             33.7     PT/INR - ( 25 May 2024 07:27 )   PT: 12.1 sec;   INR: 1.04 ratio         PTT - ( 24 May 2024 10:30 )  PTT:27.8 sec  Urinalysis Basic - ( 25 May 2024 07:27 )    Color: x / Appearance: x / SG: x / pH: x  Gluc: 86 mg/dL / Ketone: x  / Bili: x / Urobili: x   Blood: x / Protein: x / Nitrite: x   Leuk Esterase: x / RBC: x / WBC x   Sq Epi: x / Non Sq Epi: x / Bacteria: x      LIVER FUNCTIONS - ( 25 May 2024 07:27 )  Alb: 2.2 g/dL / Pro: 5.5 g/dL / ALK PHOS: 149 U/L / ALT: 46 U/L / AST: 66 U/L / GGT: x             RECENT CULTURES:  05-24 @ 12:20 Catheterized Catheterized     10,000 - 49,000 CFU/mL Escherichia coli        05-24 @ 10:30          NotDete        All imaging and data are reviewed.     
     CHIEF COMPLAINT/ REASON FOR VISIT  .. Patient was seen to address the  issue listed under PROBLEM LIST which is located toward bottom of this note     CARLITOS MASTER    PLV 2EAS 217 D1    Allergies    penicillin (Hives; Urticaria)  penicillin (Swelling; Rash; Hives)    Intolerances        PAST MEDICAL & SURGICAL HISTORY:  JILLIAN on CPAP      Myocardial infarction  2002,2006      BPH (benign prostatic hyperplasia)      Memory loss      Other secondary osteoarthritis of left knee      CAD (coronary artery disease)      Hypothyroid      Essential hypertension      HLD (hyperlipidemia)      Dementia      PAD (peripheral artery disease)      HTN (hypertension)      HLD (hyperlipidemia)      S/P arthroscopy of left knee  2014      Coronary angioplasty status  PCI with stents 2002 LAD,  2006 x 1      Status post arterial stent      History of left knee replacement          FAMILY HISTORY:  Family history of lung cancer (Mother)  mother        Home Medications:  Aquaphor topical ointment: Apply topically to affected area 2 times a day (24 May 2024 12:31)  aspirin 81 mg oral tablet, chewable: 1 tab(s) chewed once a day (24 May 2024 15:20)  Bacid oral capsule: 1 cap(s) orally 2 times a day until 5/28/24 (24 May 2024 15:20)  busPIRone 5 mg oral tablet: 1 tab(s) orally 3 times a day (24 May 2024 15:20)  cefTRIAXone 1 g injection: 1 gram(s) intravenously 2 times a day for prophylaxis until 5/28/24 (started 5/23 AM) (24 May 2024 15:20)  clopidogrel 75 mg oral tablet: 1 tab(s) orally once a day (24 May 2024 15:20)  Dulcolax Laxative 10 mg rectal suppository: 1 suppository(ies) rectally as needed for  constipation (24 May 2024 15:20)  escitalopram 5 mg oral tablet: 1.5 tab(s) orally once a day (24 May 2024 15:20)  Fleet Enema 19 g-7 g rectal enema: 133 milliliter(s) rectally as needed for  constipation (24 May 2024 15:20)  levothyroxine 75 mcg (0.075 mg) oral tablet: 1 tab(s) orally once a day (24 May 2024 15:20)  melatonin 3 mg oral tablet: 2 tab(s) orally once a day (at bedtime) (24 May 2024 15:20)  memantine 10 mg oral tablet: 1 tab(s) orally 2 times a day (24 May 2024 15:20)  Milk of Magnesia 1200 mg/15 mL oral liquid: 30 milliliter(s) orally once a day as needed for  constipation (24 May 2024 15:20)  rosuvastatin 20 mg oral tablet: 1 tab(s) orally once a day (at bedtime) (24 May 2024 15:20)  tamsulosin 0.4 mg oral capsule: 1 cap(s) orally once a day (in the evening) (24 May 2024 15:20)  Tylenol 500 mg oral tablet: 2 tab(s) orally 3 times a day (24 May 2024 15:20)      MEDICATIONS  (STANDING):  aspirin  chewable 81 milliGRAM(s) Oral daily  atorvastatin 80 milliGRAM(s) Oral at bedtime  busPIRone 5 milliGRAM(s) Oral three times a day  clopidogrel Tablet 75 milliGRAM(s) Oral daily  ertapenem  IVPB 1000 milliGRAM(s) IV Intermittent every 24 hours  escitalopram 7.5 milliGRAM(s) Oral daily  heparin   Injectable 5000 Unit(s) SubCutaneous every 12 hours  lactobacillus acidophilus 1 Tablet(s) Oral every 12 hours  levothyroxine 75 MICROGram(s) Oral daily  melatonin 5 milliGRAM(s) Oral at bedtime  memantine 10 milliGRAM(s) Oral two times a day  mesalamine Suppository 1000 milliGRAM(s) Rectal at bedtime  pantoprazole    Tablet 40 milliGRAM(s) Oral before breakfast  polyethylene glycol 3350 17 Gram(s) Oral two times a day  senna 2 Tablet(s) Oral at bedtime  sodium chloride 0.9%. 1000 milliLiter(s) (50 mL/Hr) IV Continuous <Continuous>  tamsulosin 0.4 milliGRAM(s) Oral at bedtime    MEDICATIONS  (PRN):  acetaminophen     Tablet .. 650 milliGRAM(s) Oral every 6 hours PRN Temp greater or equal to 38C (100.4F), Mild Pain (1 - 3)  aluminum hydroxide/magnesium hydroxide/simethicone Suspension 30 milliLiter(s) Oral every 4 hours PRN Dyspepsia  bisacodyl Suppository 10 milliGRAM(s) Rectal daily PRN Constipation  magnesium hydroxide Suspension 30 milliLiter(s) Oral daily PRN Constipation  ondansetron Injectable 4 milliGRAM(s) IV Push every 8 hours PRN Nausea and/or Vomiting      Diet, NPO:   Except Medications (05-24-24 @ 14:22) [Active]          Vital Signs Last 24 Hrs  T(C): 36.8 (25 May 2024 05:50), Max: 37.8 (24 May 2024 18:48)  T(F): 98.2 (25 May 2024 05:50), Max: 100.1 (24 May 2024 18:48)  HR: 81 (25 May 2024 05:50) (74 - 109)  BP: 110/65 (25 May 2024 05:50) (109/65 - 149/78)  BP(mean): --  RR: 18 (25 May 2024 05:50) (18 - 18)  SpO2: 91% (25 May 2024 05:50) (91% - 96%)    Parameters below as of 25 May 2024 05:50  Patient On (Oxygen Delivery Method): room air          05-24-24 @ 07:01  -  05-25-24 @ 07:00  --------------------------------------------------------  IN: 0 mL / OUT: 1600 mL / NET: -1600 mL              LABS:                        11.3   12.62 )-----------( 94       ( 25 May 2024 07:27 )             33.7     05-25    145  |  115<H>  |  33<H>  ----------------------------<  86  3.7   |  26  |  1.10    Ca    8.3<L>      25 May 2024 07:27    TPro  5.5<L>  /  Alb  2.2<L>  /  TBili  0.6  /  DBili  x   /  AST  66<H>  /  ALT  46  /  AlkPhos  149<H>  05-25    PT/INR - ( 25 May 2024 07:27 )   PT: 12.1 sec;   INR: 1.04 ratio         PTT - ( 24 May 2024 10:30 )  PTT:27.8 sec  Urinalysis Basic - ( 25 May 2024 07:27 )    Color: x / Appearance: x / SG: x / pH: x  Gluc: 86 mg/dL / Ketone: x  / Bili: x / Urobili: x   Blood: x / Protein: x / Nitrite: x   Leuk Esterase: x / RBC: x / WBC x   Sq Epi: x / Non Sq Epi: x / Bacteria: x            WBC:  WBC Count: 12.62 K/uL (05-25 @ 07:27)  WBC Count: 20.43 K/uL (05-24 @ 10:30)      MICROBIOLOGY:  RECENT CULTURES:              PT/INR - ( 25 May 2024 07:27 )   PT: 12.1 sec;   INR: 1.04 ratio         PTT - ( 24 May 2024 10:30 )  PTT:27.8 sec    Sodium:  Sodium: 145 mmol/L (05-25 @ 07:27)  Sodium: 142 mmol/L (05-24 @ 10:30)      1.10 mg/dL 05-25 @ 07:27  1.40 mg/dL 05-24 @ 10:30      Hemoglobin:  Hemoglobin: 11.3 g/dL (05-25 @ 07:27)  Hemoglobin: 12.4 g/dL (05-24 @ 10:30)      Platelets: Platelet Count - Automated: 94 K/uL (05-25 @ 07:27)  Platelet Count - Automated: 93 K/uL (05-24 @ 10:30)      LIVER FUNCTIONS - ( 25 May 2024 07:27 )  Alb: 2.2 g/dL / Pro: 5.5 g/dL / ALK PHOS: 149 U/L / ALT: 46 U/L / AST: 66 U/L / GGT: x             Urinalysis Basic - ( 25 May 2024 07:27 )    Color: x / Appearance: x / SG: x / pH: x  Gluc: 86 mg/dL / Ketone: x  / Bili: x / Urobili: x   Blood: x / Protein: x / Nitrite: x   Leuk Esterase: x / RBC: x / WBC x   Sq Epi: x / Non Sq Epi: x / Bacteria: x        RADIOLOGY & ADDITIONAL STUDIES:      MICROBIOLOGY:  RECENT CULTURES:          
     CHIEF COMPLAINT/ REASON FOR VISIT  .. Patient was seen to address the  issue listed under PROBLEM LIST which is located toward bottom of this note     CARLITOS MASTER    PLV 2EAS 217 D1    Allergies    penicillin (Hives; Urticaria)  penicillin (Swelling; Rash; Hives)    Intolerances        PAST MEDICAL & SURGICAL HISTORY:  JILLIAN on CPAP      Myocardial infarction  2002,2006      BPH (benign prostatic hyperplasia)      Memory loss      Other secondary osteoarthritis of left knee      CAD (coronary artery disease)      Hypothyroid      Essential hypertension      HLD (hyperlipidemia)      Dementia      PAD (peripheral artery disease)      HTN (hypertension)      HLD (hyperlipidemia)      S/P arthroscopy of left knee  2014      Coronary angioplasty status  PCI with stents 2002 LAD,  2006 x 1      Status post arterial stent      History of left knee replacement          FAMILY HISTORY:  Family history of lung cancer (Mother)  mother        Home Medications:  Aquaphor topical ointment: Apply topically to affected area 2 times a day (24 May 2024 12:31)  aspirin 81 mg oral tablet, chewable: 1 tab(s) chewed once a day (24 May 2024 15:20)  Bacid oral capsule: 1 cap(s) orally 2 times a day until 5/28/24 (24 May 2024 15:20)  busPIRone 5 mg oral tablet: 1 tab(s) orally 3 times a day (24 May 2024 15:20)  cefTRIAXone 1 g injection: 1 gram(s) intravenously 2 times a day for prophylaxis until 5/28/24 (started 5/23 AM) (24 May 2024 15:20)  clopidogrel 75 mg oral tablet: 1 tab(s) orally once a day (24 May 2024 15:20)  Dulcolax Laxative 10 mg rectal suppository: 1 suppository(ies) rectally as needed for  constipation (24 May 2024 15:20)  escitalopram 5 mg oral tablet: 1.5 tab(s) orally once a day (24 May 2024 15:20)  Fleet Enema 19 g-7 g rectal enema: 133 milliliter(s) rectally as needed for  constipation (24 May 2024 15:20)  levothyroxine 75 mcg (0.075 mg) oral tablet: 1 tab(s) orally once a day (24 May 2024 15:20)  melatonin 3 mg oral tablet: 2 tab(s) orally once a day (at bedtime) (24 May 2024 15:20)  memantine 10 mg oral tablet: 1 tab(s) orally 2 times a day (24 May 2024 15:20)  Milk of Magnesia 1200 mg/15 mL oral liquid: 30 milliliter(s) orally once a day as needed for  constipation (24 May 2024 15:20)  rosuvastatin 20 mg oral tablet: 1 tab(s) orally once a day (at bedtime) (24 May 2024 15:20)  tamsulosin 0.4 mg oral capsule: 1 cap(s) orally once a day (in the evening) (24 May 2024 15:20)  Tylenol 500 mg oral tablet: 2 tab(s) orally 3 times a day (24 May 2024 15:20)      MEDICATIONS  (STANDING):  aspirin  chewable 81 milliGRAM(s) Oral daily  atorvastatin 80 milliGRAM(s) Oral at bedtime  busPIRone 5 milliGRAM(s) Oral three times a day  clopidogrel Tablet 75 milliGRAM(s) Oral daily  ertapenem  IVPB 1000 milliGRAM(s) IV Intermittent every 24 hours  escitalopram 7.5 milliGRAM(s) Oral daily  heparin   Injectable 5000 Unit(s) SubCutaneous every 12 hours  lactobacillus acidophilus 1 Tablet(s) Oral every 12 hours  levothyroxine 75 MICROGram(s) Oral daily  melatonin 5 milliGRAM(s) Oral at bedtime  memantine 10 milliGRAM(s) Oral two times a day  mesalamine Suppository 1000 milliGRAM(s) Rectal at bedtime  pantoprazole    Tablet 40 milliGRAM(s) Oral before breakfast  polyethylene glycol 3350 17 Gram(s) Oral two times a day  senna 2 Tablet(s) Oral at bedtime  tamsulosin 0.4 milliGRAM(s) Oral at bedtime    MEDICATIONS  (PRN):  acetaminophen     Tablet .. 650 milliGRAM(s) Oral every 6 hours PRN Temp greater or equal to 38C (100.4F), Mild Pain (1 - 3)  aluminum hydroxide/magnesium hydroxide/simethicone Suspension 30 milliLiter(s) Oral every 4 hours PRN Dyspepsia  bisacodyl Suppository 10 milliGRAM(s) Rectal daily PRN Constipation  magnesium hydroxide Suspension 30 milliLiter(s) Oral daily PRN Constipation  ondansetron Injectable 4 milliGRAM(s) IV Push every 8 hours PRN Nausea and/or Vomiting      Diet, Regular:   Supplement Feeding Modality:  Oral  Ensure Plus High Protein Cans or Servings Per Day:  1       Frequency:  Two Times a day (05-25-24 @ 13:36) [Active]          Vital Signs Last 24 Hrs  T(C): 36.6 (27 May 2024 05:05), Max: 37.7 (26 May 2024 20:48)  T(F): 97.9 (27 May 2024 05:05), Max: 99.8 (26 May 2024 20:48)  HR: 64 (27 May 2024 05:05) (62 - 77)  BP: 113/60 (27 May 2024 05:05) (113/60 - 121/62)  BP(mean): --  RR: 18 (27 May 2024 05:05) (18 - 19)  SpO2: 97% (27 May 2024 05:05) (91% - 98%)    Parameters below as of 27 May 2024 05:05  Patient On (Oxygen Delivery Method): BiPAP/CPAP          05-26-24 @ 07:01  -  05-27-24 @ 07:00  --------------------------------------------------------  IN: 0 mL / OUT: 1600 mL / NET: -1600 mL              LABS:                        11.5   7.24  )-----------( 109      ( 27 May 2024 07:06 )             34.8     05-27    147<H>  |  115<H>  |  23  ----------------------------<  129<H>  3.7   |  28  |  0.99    Ca    8.4<L>      27 May 2024 07:06  Mg     2.1     05-27    TPro  5.8<L>  /  Alb  2.3<L>  /  TBili  0.6  /  DBili  0.2  /  AST  67<H>  /  ALT  54  /  AlkPhos  177<H>  05-26      Urinalysis Basic - ( 27 May 2024 07:06 )    Color: x / Appearance: x / SG: x / pH: x  Gluc: 129 mg/dL / Ketone: x  / Bili: x / Urobili: x   Blood: x / Protein: x / Nitrite: x   Leuk Esterase: x / RBC: x / WBC x   Sq Epi: x / Non Sq Epi: x / Bacteria: x            WBC:  WBC Count: 7.24 K/uL (05-27 @ 07:06)  WBC Count: 7.95 K/uL (05-26 @ 08:52)  WBC Count: 12.62 K/uL (05-25 @ 07:27)  WBC Count: 20.43 K/uL (05-24 @ 10:30)      MICROBIOLOGY:  RECENT CULTURES:  05-24 Catheterized Catheterized XXXX XXXX   10,000 - 49,000 CFU/mL Escherichia coli    05-24 .Blood Blood-Venous XXXX XXXX   No growth at 48 Hours    05-24 .Blood Blood-Venous XXXX XXXX   No growth at 48 Hours                    Sodium:  Sodium: 147 mmol/L (05-27 @ 07:06)  Sodium: 146 mmol/L (05-26 @ 08:52)  Sodium: 145 mmol/L (05-25 @ 07:27)  Sodium: 142 mmol/L (05-24 @ 10:30)      0.99 mg/dL 05-27 @ 07:06  0.96 mg/dL 05-26 @ 08:52  1.10 mg/dL 05-25 @ 07:27  1.40 mg/dL 05-24 @ 10:30      Hemoglobin:  Hemoglobin: 11.5 g/dL (05-27 @ 07:06)  Hemoglobin: 11.6 g/dL (05-26 @ 08:52)  Hemoglobin: 11.3 g/dL (05-25 @ 07:27)  Hemoglobin: 12.4 g/dL (05-24 @ 10:30)      Platelets: Platelet Count - Automated: 109 K/uL (05-27 @ 07:06)  Platelet Count - Automated: 95 K/uL (05-26 @ 08:52)  Platelet Count - Automated: 94 K/uL (05-25 @ 07:27)  Platelet Count - Automated: 93 K/uL (05-24 @ 10:30)      LIVER FUNCTIONS - ( 26 May 2024 08:52 )  Alb: 2.3 g/dL / Pro: 5.8 g/dL / ALK PHOS: 177 U/L / ALT: 54 U/L / AST: 67 U/L / GGT: x             Urinalysis Basic - ( 27 May 2024 07:06 )    Color: x / Appearance: x / SG: x / pH: x  Gluc: 129 mg/dL / Ketone: x  / Bili: x / Urobili: x   Blood: x / Protein: x / Nitrite: x   Leuk Esterase: x / RBC: x / WBC x   Sq Epi: x / Non Sq Epi: x / Bacteria: x        RADIOLOGY & ADDITIONAL STUDIES:      MICROBIOLOGY:  RECENT CULTURES:  05-24 Catheterized Catheterized XXXX XXXX   10,000 - 49,000 CFU/mL Escherichia coli    05-24 .Blood Blood-Venous XXXX XXXX   No growth at 48 Hours    05-24 .Blood Blood-Venous XXXX XXXX   No growth at 48 Hours            
     CHIEF COMPLAINT/ REASON FOR VISIT  .. Patient was seen to address the  issue listed under PROBLEM LIST which is located toward bottom of this note     CARLITOS MASTER    PLV 2EAS 217 D1    Allergies    penicillin (Hives; Urticaria)  penicillin (Swelling; Rash; Hives)    Intolerances        PAST MEDICAL & SURGICAL HISTORY:  JILLIAN on CPAP      Myocardial infarction  2002,2006      BPH (benign prostatic hyperplasia)      Memory loss      Other secondary osteoarthritis of left knee      CAD (coronary artery disease)      Hypothyroid      Essential hypertension      HLD (hyperlipidemia)      Dementia      PAD (peripheral artery disease)      HTN (hypertension)      HLD (hyperlipidemia)      S/P arthroscopy of left knee  2014      Coronary angioplasty status  PCI with stents 2002 LAD,  2006 x 1      Status post arterial stent      History of left knee replacement          FAMILY HISTORY:  Family history of lung cancer (Mother)  mother        Home Medications:  Aquaphor topical ointment: Apply topically to affected area 2 times a day (24 May 2024 12:31)  aspirin 81 mg oral tablet, chewable: 1 tab(s) chewed once a day (24 May 2024 15:20)  Bacid oral capsule: 1 cap(s) orally 2 times a day until 5/28/24 (24 May 2024 15:20)  busPIRone 5 mg oral tablet: 1 tab(s) orally 3 times a day (24 May 2024 15:20)  cefTRIAXone 1 g injection: 1 gram(s) intravenously 2 times a day for prophylaxis until 5/28/24 (started 5/23 AM) (24 May 2024 15:20)  clopidogrel 75 mg oral tablet: 1 tab(s) orally once a day (24 May 2024 15:20)  Dulcolax Laxative 10 mg rectal suppository: 1 suppository(ies) rectally as needed for  constipation (24 May 2024 15:20)  escitalopram 5 mg oral tablet: 1.5 tab(s) orally once a day (24 May 2024 15:20)  Fleet Enema 19 g-7 g rectal enema: 133 milliliter(s) rectally as needed for  constipation (24 May 2024 15:20)  levothyroxine 75 mcg (0.075 mg) oral tablet: 1 tab(s) orally once a day (24 May 2024 15:20)  melatonin 3 mg oral tablet: 2 tab(s) orally once a day (at bedtime) (24 May 2024 15:20)  memantine 10 mg oral tablet: 1 tab(s) orally 2 times a day (24 May 2024 15:20)  mesalamine 1000 mg rectal suppository: 1 suppository(ies) rectal once a day (at bedtime) (28 May 2024 06:15)  Milk of Magnesia 1200 mg/15 mL oral liquid: 30 milliliter(s) orally once a day as needed for  constipation (24 May 2024 15:20)  rosuvastatin 20 mg oral tablet: 1 tab(s) orally once a day (at bedtime) (24 May 2024 15:20)  tamsulosin 0.4 mg oral capsule: 1 cap(s) orally once a day (in the evening) (24 May 2024 15:20)  Tylenol 500 mg oral tablet: 2 tab(s) orally 3 times a day (24 May 2024 15:20)      MEDICATIONS  (STANDING):  aspirin  chewable 81 milliGRAM(s) Oral daily  atorvastatin 80 milliGRAM(s) Oral at bedtime  busPIRone 5 milliGRAM(s) Oral three times a day  clopidogrel Tablet 75 milliGRAM(s) Oral daily  dextrose 5%. 1000 milliLiter(s) (40 mL/Hr) IV Continuous <Continuous>  escitalopram 7.5 milliGRAM(s) Oral daily  heparin   Injectable 5000 Unit(s) SubCutaneous every 12 hours  lactobacillus acidophilus 1 Tablet(s) Oral every 12 hours  levothyroxine 75 MICROGram(s) Oral daily  melatonin 5 milliGRAM(s) Oral at bedtime  memantine 10 milliGRAM(s) Oral two times a day  mesalamine Suppository 1000 milliGRAM(s) Rectal at bedtime  pantoprazole    Tablet 40 milliGRAM(s) Oral before breakfast  polyethylene glycol 3350 17 Gram(s) Oral two times a day  senna 2 Tablet(s) Oral at bedtime  tamsulosin 0.4 milliGRAM(s) Oral at bedtime    MEDICATIONS  (PRN):  acetaminophen     Tablet .. 650 milliGRAM(s) Oral every 6 hours PRN Temp greater or equal to 38C (100.4F), Mild Pain (1 - 3)  aluminum hydroxide/magnesium hydroxide/simethicone Suspension 30 milliLiter(s) Oral every 4 hours PRN Dyspepsia  bisacodyl Suppository 10 milliGRAM(s) Rectal daily PRN Constipation  magnesium hydroxide Suspension 30 milliLiter(s) Oral daily PRN Constipation  ondansetron Injectable 4 milliGRAM(s) IV Push every 8 hours PRN Nausea and/or Vomiting      Diet, Regular:   Supplement Feeding Modality:  Oral  Ensure Plus High Protein Cans or Servings Per Day:  1       Frequency:  Two Times a day (05-25-24 @ 13:36) [Active]          Vital Signs Last 24 Hrs  T(C): 36.7 (28 May 2024 04:49), Max: 37.4 (27 May 2024 11:19)  T(F): 98.1 (28 May 2024 04:49), Max: 99.4 (27 May 2024 11:19)  HR: 61 (28 May 2024 04:49) (61 - 78)  BP: 171/87 (28 May 2024 04:49) (126/72 - 171/87)  BP(mean): --  RR: 18 (28 May 2024 04:49) (18 - 18)  SpO2: 93% (28 May 2024 04:49) (91% - 98%)    Parameters below as of 28 May 2024 04:49  Patient On (Oxygen Delivery Method): BiPAP/CPAP          05-27-24 @ 07:01  -  05-28-24 @ 07:00  --------------------------------------------------------  IN: 0 mL / OUT: 2050 mL / NET: -2050 mL              LABS:                        11.2   7.28  )-----------( 147      ( 28 May 2024 06:51 )             33.9     05-28    144  |  112<H>  |  22  ----------------------------<  178<H>  3.5   |  28  |  0.85    Ca    8.4<L>      28 May 2024 06:51  Mg     2.1     05-28    TPro  5.6<L>  /  Alb  2.1<L>  /  TBili  0.5  /  DBili  0.1  /  AST  71<H>  /  ALT  75  /  AlkPhos  146<H>  05-28      Urinalysis Basic - ( 28 May 2024 06:51 )    Color: x / Appearance: x / SG: x / pH: x  Gluc: 178 mg/dL / Ketone: x  / Bili: x / Urobili: x   Blood: x / Protein: x / Nitrite: x   Leuk Esterase: x / RBC: x / WBC x   Sq Epi: x / Non Sq Epi: x / Bacteria: x            WBC:  WBC Count: 7.28 K/uL (05-28 @ 06:51)  WBC Count: 7.24 K/uL (05-27 @ 07:06)  WBC Count: 7.95 K/uL (05-26 @ 08:52)  WBC Count: 12.62 K/uL (05-25 @ 07:27)  WBC Count: 20.43 K/uL (05-24 @ 10:30)      MICROBIOLOGY:  RECENT CULTURES:  05-24 Catheterized Catheterized Escherichia coli XXXX   10,000 - 49,000 CFU/mL Escherichia coli    05-24 .Blood Blood-Venous XXXX XXXX   No growth at 72 Hours    05-24 .Blood Blood-Venous XXXX XXXX   No growth at 72 Hours                    Sodium:  Sodium: 144 mmol/L (05-28 @ 06:51)  Sodium: 147 mmol/L (05-27 @ 07:06)  Sodium: 146 mmol/L (05-26 @ 08:52)  Sodium: 145 mmol/L (05-25 @ 07:27)  Sodium: 142 mmol/L (05-24 @ 10:30)      0.85 mg/dL 05-28 @ 06:51  0.99 mg/dL 05-27 @ 07:06  0.96 mg/dL 05-26 @ 08:52  1.10 mg/dL 05-25 @ 07:27  1.40 mg/dL 05-24 @ 10:30      Hemoglobin:  Hemoglobin: 11.2 g/dL (05-28 @ 06:51)  Hemoglobin: 11.5 g/dL (05-27 @ 07:06)  Hemoglobin: 11.6 g/dL (05-26 @ 08:52)  Hemoglobin: 11.3 g/dL (05-25 @ 07:27)  Hemoglobin: 12.4 g/dL (05-24 @ 10:30)      Platelets: Platelet Count - Automated: 147 K/uL (05-28 @ 06:51)  Platelet Count - Automated: 109 K/uL (05-27 @ 07:06)  Platelet Count - Automated: 95 K/uL (05-26 @ 08:52)  Platelet Count - Automated: 94 K/uL (05-25 @ 07:27)  Platelet Count - Automated: 93 K/uL (05-24 @ 10:30)      LIVER FUNCTIONS - ( 28 May 2024 06:51 )  Alb: 2.1 g/dL / Pro: 5.6 g/dL / ALK PHOS: 146 U/L / ALT: 75 U/L / AST: 71 U/L / GGT: x             Urinalysis Basic - ( 28 May 2024 06:51 )    Color: x / Appearance: x / SG: x / pH: x  Gluc: 178 mg/dL / Ketone: x  / Bili: x / Urobili: x   Blood: x / Protein: x / Nitrite: x   Leuk Esterase: x / RBC: x / WBC x   Sq Epi: x / Non Sq Epi: x / Bacteria: x        RADIOLOGY & ADDITIONAL STUDIES:      MICROBIOLOGY:  RECENT CULTURES:  05-24 Catheterized Catheterized Escherichia coli XXXX   10,000 - 49,000 CFU/mL Escherichia coli    05-24 .Blood Blood-Venous XXXX XXXX   No growth at 72 Hours    05-24 .Blood Blood-Venous XXXX XXXX   No growth at 72 Hours            
  Patient is a 78y Male whom presented to the hospital with mally     PAST MEDICAL & SURGICAL HISTORY:  JILLIAN on CPAP      Myocardial infarction  2002,2006      BPH (benign prostatic hyperplasia)      Memory loss      Other secondary osteoarthritis of left knee      CAD (coronary artery disease)      Hypothyroid      Essential hypertension      HLD (hyperlipidemia)      Dementia      PAD (peripheral artery disease)      HTN (hypertension)      HLD (hyperlipidemia)      S/P arthroscopy of left knee  2014      Coronary angioplasty status  PCI with stents 2002 LAD,  2006 x 1      Status post arterial stent      History of left knee replacement          MEDICATIONS  (STANDING):  aspirin  chewable 81 milliGRAM(s) Oral daily  atorvastatin 80 milliGRAM(s) Oral at bedtime  busPIRone 5 milliGRAM(s) Oral three times a day  clopidogrel Tablet 75 milliGRAM(s) Oral daily  escitalopram 7.5 milliGRAM(s) Oral daily  heparin   Injectable 5000 Unit(s) SubCutaneous every 12 hours  lactobacillus acidophilus 1 Tablet(s) Oral every 12 hours  levothyroxine 75 MICROGram(s) Oral daily  melatonin 5 milliGRAM(s) Oral at bedtime  memantine 10 milliGRAM(s) Oral two times a day  mesalamine Suppository 1000 milliGRAM(s) Rectal at bedtime  pantoprazole    Tablet 40 milliGRAM(s) Oral before breakfast  polyethylene glycol 3350 17 Gram(s) Oral two times a day  senna 2 Tablet(s) Oral at bedtime  sodium chloride 0.9%. 1000 milliLiter(s) (50 mL/Hr) IV Continuous <Continuous>  tamsulosin 0.4 milliGRAM(s) Oral at bedtime      Allergies    penicillin (Hives; Urticaria)  penicillin (Swelling; Rash; Hives)    Intolerances        SOCIAL HISTORY:  Denies ETOh,Smoking,     FAMILY HISTORY:  Family history of lung cancer (Mother)  mother          REVIEW OF SYSTEMS:    unable to obtained a good review system                                              11.6   7.95  )-----------( 95       ( 26 May 2024 08:52 )             34.5       CBC Full  -  ( 26 May 2024 08:52 )  WBC Count : 7.95 K/uL  RBC Count : 3.71 M/uL  Hemoglobin : 11.6 g/dL  Hematocrit : 34.5 %  Platelet Count - Automated : 95 K/uL  Mean Cell Volume : 93.0 fl  Mean Cell Hemoglobin : 31.3 pg  Mean Cell Hemoglobin Concentration : 33.6 gm/dL  Auto Neutrophil # : x  Auto Lymphocyte # : x  Auto Monocyte # : x  Auto Eosinophil # : x  Auto Basophil # : x  Auto Neutrophil % : x  Auto Lymphocyte % : x  Auto Monocyte % : x  Auto Eosinophil % : x  Auto Basophil % : x      05-26    146<H>  |  114<H>  |  28<H>  ----------------------------<  114<H>  3.7   |  27  |  0.96    Ca    8.7      26 May 2024 08:52  Mg     2.1     05-26    TPro  5.5<L>  /  Alb  2.2<L>  /  TBili  0.6  /  DBili  x   /  AST  66<H>  /  ALT  46  /  AlkPhos  149<H>  05-25      CAPILLARY BLOOD GLUCOSE          Vital Signs Last 24 Hrs  T(C): 36.9 (26 May 2024 04:09), Max: 37 (25 May 2024 12:17)  T(F): 98.5 (26 May 2024 04:09), Max: 98.6 (25 May 2024 12:17)  HR: 74 (26 May 2024 07:32) (70 - 74)  BP: 148/76 (26 May 2024 04:09) (119/68 - 148/76)  BP(mean): --  RR: 18 (26 May 2024 04:09) (18 - 18)  SpO2: 97% (26 May 2024 07:32) (92% - 97%)    Parameters below as of 26 May 2024 04:09  Patient On (Oxygen Delivery Method): BiPAP/CPAP        Urinalysis Basic - ( 26 May 2024 08:52 )    Color: x / Appearance: x / SG: x / pH: x  Gluc: 114 mg/dL / Ketone: x  / Bili: x / Urobili: x   Blood: x / Protein: x / Nitrite: x   Leuk Esterase: x / RBC: x / WBC x   Sq Epi: x / Non Sq Epi: x / Bacteria: x        PT/INR - ( 25 May 2024 07:27 )   PT: 12.1 sec;   INR: 1.04 ratio           PHYSICAL EXAM:    Constitutional: NAD  HEENT: conjunctive   clear   Neck:  No JVD  Respiratory: CTAB  Cardiovascular: S1 and S2  Gastrointestinal: BS+, soft, NT/ND  Extremities: No peripheral edema  
[facility]  INFECTIOUS DISEASES - [address], [place] [address]  Tel: [phone]     Fax: [phone]  =======================================================    CARLITOS LOPEZ [id number]    Follow up: No fevers. Patient awake.    Allergies:  penicillin (Hives; Urticaria)  penicillin (Swelling; Rash; Hives)      Antibiotics:  acetaminophen     Tablet .. 650 milliGRAM(s) Oral every 6 hours PRN  aluminum hydroxide/magnesium hydroxide/simethicone Suspension 30 milliLiter(s) Oral every 4 hours PRN  aspirin  chewable 81 milliGRAM(s) Oral daily  atorvastatin 80 milliGRAM(s) Oral at bedtime  bisacodyl Suppository 10 milliGRAM(s) Rectal daily PRN  busPIRone 5 milliGRAM(s) Oral three times a day  cefpodoxime 200 milliGRAM(s) Oral every 12 hours  clopidogrel Tablet 75 milliGRAM(s) Oral daily  dextrose 5%. 1000 milliLiter(s) IV Continuous <Continuous>  escitalopram 7.5 milliGRAM(s) Oral daily  heparin   Injectable 5000 Unit(s) SubCutaneous every 12 hours  lactobacillus acidophilus 1 Tablet(s) Oral every 12 hours  levothyroxine 75 MICROGram(s) Oral daily  magnesium hydroxide Suspension 30 milliLiter(s) Oral daily PRN  melatonin 5 milliGRAM(s) Oral at bedtime  memantine 10 milliGRAM(s) Oral two times a day  mesalamine Suppository 1000 milliGRAM(s) Rectal at bedtime  ondansetron Injectable 4 milliGRAM(s) IV Push every 8 hours PRN  pantoprazole    Tablet 40 milliGRAM(s) Oral before breakfast  polyethylene glycol 3350 17 Gram(s) Oral two times a day  senna 2 Tablet(s) Oral at bedtime  tamsulosin 0.4 milliGRAM(s) Oral at bedtime       REVIEW OF SYSTEMS:  Unable to obtain 2/2 dementia     Physical Exam:  ICU Vital Signs Last 24 Hrs  T(C): 36.7 (28 May 2024 11:31), Max: 37.2 (27 May 2024 20:04)  T(F): 98 (28 May 2024 11:31), Max: 98.9 (27 May 2024 20:04)  HR: 66 (28 May 2024 11:31) (61 - 72)  BP: 133/65 (28 May 2024 11:31) (126/72 - 171/87)  BP(mean): --  ABP: --  ABP(mean): --  RR: 18 (28 May 2024 11:31) (18 - 18)  SpO2: 92% (28 May 2024 11:31) (92% - 98%)    O2 Parameters below as of 28 May 2024 11:31  Patient On (Oxygen Delivery Method): room air           GEN: NAD  HEENT: normocephalic and atraumatic.   NECK: Supple.   LUNGS: Normal respiratory effort  HEART: Regular rate and rhythm  ABDOMEN: Soft, nontender, and nondistended.    EXTREMITIES: No leg edema.    Labs:  05-28    144  |  112<H>  |  22  ----------------------------<  178<H>  3.5   |  28  |  0.85    Ca    8.4<L>      28 May 2024 06:51  Mg     2.1     05-28    TPro  5.6<L>  /  Alb  2.1<L>  /  TBili  0.5  /  DBili  0.1  /  AST  71<H>  /  ALT  75  /  AlkPhos  146<H>  05-28                          11.2   7.28  )-----------( 147      ( 28 May 2024 06:51 )             33.9       Urinalysis Basic - ( 28 May 2024 06:51 )    Color: x / Appearance: x / SG: x / pH: x  Gluc: 178 mg/dL / Ketone: x  / Bili: x / Urobili: x   Blood: x / Protein: x / Nitrite: x   Leuk Esterase: x / RBC: x / WBC x   Sq Epi: x / Non Sq Epi: x / Bacteria: x      LIVER FUNCTIONS - ( 28 May 2024 06:51 )  Alb: 2.1 g/dL / Pro: 5.6 g/dL / ALK PHOS: 146 U/L / ALT: 75 U/L / AST: 71 U/L / GGT: x             RECENT CULTURES:  05-24 @ 12:20 Catheterized Catheterized Escherichia coli    10,000 - 49,000 CFU/mL Escherichia coli        05-24 @ 10:30 .Blood Blood-Venous     No growth at 72 Hours      NotDetec  05-24 @ 10:20 .Blood Blood-Venous     No growth at 72 Hours              All imaging and data are reviewed.   physician/nurse/care manager.     
[facility]  Walter Eubanks PA-C  [address] [place] [address]  [phone]      INTERVAL HPI/OVERNIGHT EVENTS:  Pt s/e  +BM  No new GI events    MEDICATIONS  (STANDING):  aspirin  chewable 81 milliGRAM(s) Oral daily  atorvastatin 80 milliGRAM(s) Oral at bedtime  busPIRone 5 milliGRAM(s) Oral three times a day  clopidogrel Tablet 75 milliGRAM(s) Oral daily  escitalopram 7.5 milliGRAM(s) Oral daily  heparin   Injectable 5000 Unit(s) SubCutaneous every 12 hours  lactobacillus acidophilus 1 Tablet(s) Oral every 12 hours  levothyroxine 75 MICROGram(s) Oral daily  melatonin 5 milliGRAM(s) Oral at bedtime  memantine 10 milliGRAM(s) Oral two times a day  mesalamine Suppository 1000 milliGRAM(s) Rectal at bedtime  pantoprazole    Tablet 40 milliGRAM(s) Oral before breakfast  polyethylene glycol 3350 17 Gram(s) Oral two times a day  senna 2 Tablet(s) Oral at bedtime  tamsulosin 0.4 milliGRAM(s) Oral at bedtime    MEDICATIONS  (PRN):  acetaminophen     Tablet .. 650 milliGRAM(s) Oral every 6 hours PRN Temp greater or equal to 38C (100.4F), Mild Pain (1 - 3)  aluminum hydroxide/magnesium hydroxide/simethicone Suspension 30 milliLiter(s) Oral every 4 hours PRN Dyspepsia  bisacodyl Suppository 10 milliGRAM(s) Rectal daily PRN Constipation  magnesium hydroxide Suspension 30 milliLiter(s) Oral daily PRN Constipation  ondansetron Injectable 4 milliGRAM(s) IV Push every 8 hours PRN Nausea and/or Vomiting      Allergies    penicillin (Hives; Urticaria)  penicillin (Swelling; Rash; Hives)      PHYSICAL EXAM:   Vital Signs:  Vital Signs Last 24 Hrs  T(C): 36.6 (27 May 2024 05:05), Max: 37.7 (26 May 2024 20:48)  T(F): 97.9 (27 May 2024 05:05), Max: 99.8 (26 May 2024 20:48)  HR: 64 (27 May 2024 05:05) (62 - 77)  BP: 113/60 (27 May 2024 05:05) (113/60 - 121/62)  BP(mean): --  RR: 18 (27 May 2024 05:05) (18 - 19)  SpO2: 97% (27 May 2024 05:05) (91% - 98%)    Parameters below as of 27 May 2024 05:05  Patient On (Oxygen Delivery Method): BiPAP/CPAP      Daily     Daily Weight in k.3 (27 May 2024 05:05)    GENERAL:  Appears stated age  HEENT:  NC/AT  CHEST:  Full & symmetric excursion  HEART:  Regular rhythm  ABDOMEN:  Soft, non-tender, non-distended  EXTEREMITIES:  no cyanosis  SKIN:  No rash  NEURO:  Confused      LABS:                        11.5   7.24  )-----------( 109      ( 27 May 2024 07:06 )             34.8     05-    147<H>  |  115<H>  |  23  ----------------------------<  129<H>  3.7   |  28  |  0.99    Ca    8.4<L>      27 May 2024 07:06  Mg     2.1     05-    TPro  5.8<L>  /  Alb  2.3<L>  /  TBili  0.6  /  DBili  0.2  /  AST  67<H>  /  ALT  54  /  AlkPhos  177<H>  05-26      Urinalysis Basic - ( 27 May 2024 07:06 )    Color: x / Appearance: x / SG: x / pH: x  Gluc: 129 mg/dL / Ketone: x  / Bili: x / Urobili: x   Blood: x / Protein: x / Nitrite: x   Leuk Esterase: x / RBC: x / WBC x   Sq Epi: x / Non Sq Epi: x / Bacteria: x  
[facility]  Walter Eubanks PA-C  [address] [place] [address]  [phone]      INTERVAL HPI/OVERNIGHT EVENTS:  Pt s/e  +BM    MEDICATIONS  (STANDING):  aspirin  chewable 81 milliGRAM(s) Oral daily  atorvastatin 80 milliGRAM(s) Oral at bedtime  busPIRone 5 milliGRAM(s) Oral three times a day  clopidogrel Tablet 75 milliGRAM(s) Oral daily  ertapenem  IVPB 1000 milliGRAM(s) IV Intermittent every 24 hours  escitalopram 7.5 milliGRAM(s) Oral daily  heparin   Injectable 5000 Unit(s) SubCutaneous every 12 hours  lactobacillus acidophilus 1 Tablet(s) Oral every 12 hours  levothyroxine 75 MICROGram(s) Oral daily  melatonin 5 milliGRAM(s) Oral at bedtime  memantine 10 milliGRAM(s) Oral two times a day  mesalamine Suppository 1000 milliGRAM(s) Rectal at bedtime  pantoprazole    Tablet 40 milliGRAM(s) Oral before breakfast  polyethylene glycol 3350 17 Gram(s) Oral two times a day  senna 2 Tablet(s) Oral at bedtime  sodium chloride 0.9%. 1000 milliLiter(s) (50 mL/Hr) IV Continuous <Continuous>  tamsulosin 0.4 milliGRAM(s) Oral at bedtime    MEDICATIONS  (PRN):  acetaminophen     Tablet .. 650 milliGRAM(s) Oral every 6 hours PRN Temp greater or equal to 38C (100.4F), Mild Pain (1 - 3)  aluminum hydroxide/magnesium hydroxide/simethicone Suspension 30 milliLiter(s) Oral every 4 hours PRN Dyspepsia  bisacodyl Suppository 10 milliGRAM(s) Rectal daily PRN Constipation  magnesium hydroxide Suspension 30 milliLiter(s) Oral daily PRN Constipation  ondansetron Injectable 4 milliGRAM(s) IV Push every 8 hours PRN Nausea and/or Vomiting      Allergies    penicillin (Hives; Urticaria)  penicillin (Swelling; Rash; Hives)        PHYSICAL EXAM:   Vital Signs:  Vital Signs Last 24 Hrs  T(C): 36.9 (26 May 2024 04:09), Max: 37 (25 May 2024 12:17)  T(F): 98.5 (26 May 2024 04:09), Max: 98.6 (25 May 2024 12:17)  HR: 74 (26 May 2024 07:32) (70 - 74)  BP: 148/76 (26 May 2024 04:09) (119/68 - 148/76)  BP(mean): --  RR: 18 (26 May 2024 04:09) (18 - 18)  SpO2: 97% (26 May 2024 07:32) (92% - 97%)    Parameters below as of 26 May 2024 04:09  Patient On (Oxygen Delivery Method): BiPAP/CPAP      Daily     Daily Weight in k.3 (26 May 2024 04:09)    GENERAL:  Appears stated age  HEENT:  NC/AT  CHEST:  Full & symmetric excursion  HEART:  Regular rhythm  ABDOMEN:  Soft, non-tender, non-distended  EXTEREMITIES:  no cyanosis  SKIN:  No rash  NEURO:  Confused at baseline      LABS:                        11.6   7.95  )-----------( 95       ( 26 May 2024 08:52 )             34.5     05-26    146<H>  |  114<H>  |  28<H>  ----------------------------<  114<H>  3.7   |  27  |  0.96    Ca    8.7      26 May 2024 08:52  Mg     2.1     05-26    TPro  5.5<L>  /  Alb  2.2<L>  /  TBili  0.6  /  DBili  x   /  AST  66<H>  /  ALT  46  /  AlkPhos  149<H>  05-25    PT/INR - ( 25 May 2024 07:27 )   PT: 12.1 sec;   INR: 1.04 ratio           Urinalysis Basic - ( 26 May 2024 08:52 )    Color: x / Appearance: x / SG: x / pH: x  Gluc: 114 mg/dL / Ketone: x  / Bili: x / Urobili: x   Blood: x / Protein: x / Nitrite: x   Leuk Esterase: x / RBC: x / WBC x   Sq Epi: x / Non Sq Epi: x / Bacteria: x  
[facility]  Walter Eubanks PA-C  [address] [place] [address]  [phone]      INTERVAL HPI/OVERNIGHT EVENTS:  Pt s/e  No new GI events    MEDICATIONS  (STANDING):  aspirin  chewable 81 milliGRAM(s) Oral daily  atorvastatin 80 milliGRAM(s) Oral at bedtime  busPIRone 5 milliGRAM(s) Oral three times a day  cefpodoxime 200 milliGRAM(s) Oral every 12 hours  clopidogrel Tablet 75 milliGRAM(s) Oral daily  dextrose 5%. 1000 milliLiter(s) (40 mL/Hr) IV Continuous <Continuous>  escitalopram 7.5 milliGRAM(s) Oral daily  heparin   Injectable 5000 Unit(s) SubCutaneous every 12 hours  lactobacillus acidophilus 1 Tablet(s) Oral every 12 hours  levothyroxine 75 MICROGram(s) Oral daily  melatonin 5 milliGRAM(s) Oral at bedtime  memantine 10 milliGRAM(s) Oral two times a day  mesalamine Suppository 1000 milliGRAM(s) Rectal at bedtime  pantoprazole    Tablet 40 milliGRAM(s) Oral before breakfast  polyethylene glycol 3350 17 Gram(s) Oral two times a day  senna 2 Tablet(s) Oral at bedtime  tamsulosin 0.4 milliGRAM(s) Oral at bedtime    MEDICATIONS  (PRN):  acetaminophen     Tablet .. 650 milliGRAM(s) Oral every 6 hours PRN Temp greater or equal to 38C (100.4F), Mild Pain (1 - 3)  aluminum hydroxide/magnesium hydroxide/simethicone Suspension 30 milliLiter(s) Oral every 4 hours PRN Dyspepsia  bisacodyl Suppository 10 milliGRAM(s) Rectal daily PRN Constipation  magnesium hydroxide Suspension 30 milliLiter(s) Oral daily PRN Constipation  ondansetron Injectable 4 milliGRAM(s) IV Push every 8 hours PRN Nausea and/or Vomiting      Allergies    penicillin (Hives; Urticaria)  penicillin (Swelling; Rash; Hives)      PHYSICAL EXAM:   Vital Signs:  Vital Signs Last 24 Hrs  T(C): 36.7 (28 May 2024 11:31), Max: 37.2 (27 May 2024 20:04)  T(F): 98 (28 May 2024 11:31), Max: 98.9 (27 May 2024 20:04)  HR: 66 (28 May 2024 11:31) (61 - 72)  BP: 133/65 (28 May 2024 11:31) (126/72 - 171/87)  BP(mean): --  RR: 18 (28 May 2024 11:31) (18 - 18)  SpO2: 92% (28 May 2024 11:31) (92% - 98%)    Parameters below as of 28 May 2024 11:31  Patient On (Oxygen Delivery Method): room air    GENERAL:  Appears stated age  HEENT:  NC/AT  CHEST:  Full & symmetric excursion  HEART:  Regular rhythm  ABDOMEN:  Soft, non-tender, non-distended  EXTEREMITIES:  no cyanosis  SKIN:  No rash  NEURO:  Alert      LABS:                        11.2   7.28  )-----------( 147      ( 28 May 2024 06:51 )             33.9     05-28    144  |  112<H>  |  22  ----------------------------<  178<H>  3.5   |  28  |  0.85    Ca    8.4<L>      28 May 2024 06:51  Mg     2.1     05-28    TPro  5.6<L>  /  Alb  2.1<L>  /  TBili  0.5  /  DBili  0.1  /  AST  71<H>  /  ALT  75  /  AlkPhos  146<H>  05-28      Urinalysis Basic - ( 28 May 2024 06:51 )    Color: x / Appearance: x / SG: x / pH: x  Gluc: 178 mg/dL / Ketone: x  / Bili: x / Urobili: x   Blood: x / Protein: x / Nitrite: x   Leuk Esterase: x / RBC: x / WBC x   Sq Epi: x / Non Sq Epi: x / Bacteria: x  
[facility]  Walter Eubanks PA-C  [address] [place] [address]  [phone]      INTERVAL HPI/OVERNIGHT EVENTS:  Pt s/e  Pt confused at baseline  No reported BM    MEDICATIONS  (STANDING):  aspirin  chewable 81 milliGRAM(s) Oral daily  atorvastatin 80 milliGRAM(s) Oral at bedtime  busPIRone 5 milliGRAM(s) Oral three times a day  clopidogrel Tablet 75 milliGRAM(s) Oral daily  ertapenem  IVPB 1000 milliGRAM(s) IV Intermittent every 24 hours  escitalopram 7.5 milliGRAM(s) Oral daily  heparin   Injectable 5000 Unit(s) SubCutaneous every 12 hours  lactobacillus acidophilus 1 Tablet(s) Oral every 12 hours  levothyroxine 75 MICROGram(s) Oral daily  melatonin 5 milliGRAM(s) Oral at bedtime  memantine 10 milliGRAM(s) Oral two times a day  mesalamine Suppository 1000 milliGRAM(s) Rectal at bedtime  pantoprazole    Tablet 40 milliGRAM(s) Oral before breakfast  polyethylene glycol 3350 17 Gram(s) Oral two times a day  senna 2 Tablet(s) Oral at bedtime  sodium chloride 0.9%. 1000 milliLiter(s) (50 mL/Hr) IV Continuous <Continuous>  tamsulosin 0.4 milliGRAM(s) Oral at bedtime    MEDICATIONS  (PRN):  acetaminophen     Tablet .. 650 milliGRAM(s) Oral every 6 hours PRN Temp greater or equal to 38C (100.4F), Mild Pain (1 - 3)  aluminum hydroxide/magnesium hydroxide/simethicone Suspension 30 milliLiter(s) Oral every 4 hours PRN Dyspepsia  bisacodyl Suppository 10 milliGRAM(s) Rectal daily PRN Constipation  magnesium hydroxide Suspension 30 milliLiter(s) Oral daily PRN Constipation  ondansetron Injectable 4 milliGRAM(s) IV Push every 8 hours PRN Nausea and/or Vomiting      Allergies    penicillin (Hives; Urticaria)  penicillin (Swelling; Rash; Hives)      PHYSICAL EXAM:   Vital Signs:  Vital Signs Last 24 Hrs  T(C): 36.8 (25 May 2024 05:50), Max: 37.8 (24 May 2024 18:48)  T(F): 98.2 (25 May 2024 05:50), Max: 100.1 (24 May 2024 18:48)  HR: 81 (25 May 2024 05:50) (74 - 109)  BP: 110/65 (25 May 2024 05:50) (109/65 - 149/78)  BP(mean): --  RR: 18 (25 May 2024 05:50) (18 - 18)  SpO2: 91% (25 May 2024 05:50) (91% - 96%)    Parameters below as of 25 May 2024 05:50  Patient On (Oxygen Delivery Method): room air      Daily     Daily Weight in k.4 (25 May 2024 05:50)    GENERAL:  Appears stated age  HEENT:  NC/AT  CHEST:  Full & symmetric excursion  HEART:  Regular rhythm  ABDOMEN:  Soft, non-tender, non-distended  EXTEREMITIES:  no cyanosis  SKIN:  No rash  NEURO:  Confused      LABS:                        11.3   12.62 )-----------( 94       ( 25 May 2024 07:27 )             33.7     05-    145  |  115<H>  |  33<H>  ----------------------------<  86  3.7   |  26  |  1.10    Ca    8.3<L>      25 May 2024 07:27    TPro  5.5<L>  /  Alb  2.2<L>  /  TBili  0.6  /  DBili  x   /  AST  66<H>  /  ALT  46  /  AlkPhos  149<H>      PT/INR - ( 25 May 2024 07:27 )   PT: 12.1 sec;   INR: 1.04 ratio         PTT - ( 24 May 2024 10:30 )  PTT:27.8 sec  Urinalysis Basic - ( 25 May 2024 07:27 )    Color: x / Appearance: x / SG: x / pH: x  Gluc: 86 mg/dL / Ketone: x  / Bili: x / Urobili: x   Blood: x / Protein: x / Nitrite: x   Leuk Esterase: x / RBC: x / WBC x   Sq Epi: x / Non Sq Epi: x / Bacteria: x  
  Patient is a 78y Male whom presented to the hospital with mally     PAST MEDICAL & SURGICAL HISTORY:  JILLIAN on CPAP      Myocardial infarction  2002,2006      BPH (benign prostatic hyperplasia)      Memory loss      Other secondary osteoarthritis of left knee      CAD (coronary artery disease)      Hypothyroid      Essential hypertension      HLD (hyperlipidemia)      Dementia      PAD (peripheral artery disease)      HTN (hypertension)      HLD (hyperlipidemia)      S/P arthroscopy of left knee  2014      Coronary angioplasty status  PCI with stents 2002 LAD,  2006 x 1      Status post arterial stent      History of left knee replacement          MEDICATIONS  (STANDING):  aspirin  chewable 81 milliGRAM(s) Oral daily  atorvastatin 80 milliGRAM(s) Oral at bedtime  busPIRone 5 milliGRAM(s) Oral three times a day  clopidogrel Tablet 75 milliGRAM(s) Oral daily  escitalopram 7.5 milliGRAM(s) Oral daily  heparin   Injectable 5000 Unit(s) SubCutaneous every 12 hours  lactobacillus acidophilus 1 Tablet(s) Oral every 12 hours  levothyroxine 75 MICROGram(s) Oral daily  melatonin 5 milliGRAM(s) Oral at bedtime  memantine 10 milliGRAM(s) Oral two times a day  mesalamine Suppository 1000 milliGRAM(s) Rectal at bedtime  pantoprazole    Tablet 40 milliGRAM(s) Oral before breakfast  polyethylene glycol 3350 17 Gram(s) Oral two times a day  senna 2 Tablet(s) Oral at bedtime  sodium chloride 0.9%. 1000 milliLiter(s) (50 mL/Hr) IV Continuous <Continuous>  tamsulosin 0.4 milliGRAM(s) Oral at bedtime      Allergies    penicillin (Hives; Urticaria)  penicillin (Swelling; Rash; Hives)    Intolerances        SOCIAL HISTORY:  Denies ETOh,Smoking,     FAMILY HISTORY:  Family history of lung cancer (Mother)  mother          REVIEW OF SYSTEMS:    unable to obtained a good review system                                                                 11.5   7.24  )-----------( 109      ( 27 May 2024 07:06 )             34.8       CBC Full  -  ( 27 May 2024 07:06 )  WBC Count : 7.24 K/uL  RBC Count : 3.71 M/uL  Hemoglobin : 11.5 g/dL  Hematocrit : 34.8 %  Platelet Count - Automated : 109 K/uL  Mean Cell Volume : 93.8 fl  Mean Cell Hemoglobin : 31.0 pg  Mean Cell Hemoglobin Concentration : 33.0 gm/dL  Auto Neutrophil # : x  Auto Lymphocyte # : x  Auto Monocyte # : x  Auto Eosinophil # : x  Auto Basophil # : x  Auto Neutrophil % : x  Auto Lymphocyte % : x  Auto Monocyte % : x  Auto Eosinophil % : x  Auto Basophil % : x      05-27    147<H>  |  115<H>  |  23  ----------------------------<  129<H>  3.7   |  28  |  0.99    Ca    8.4<L>      27 May 2024 07:06  Mg     2.1     05-27    TPro  5.8<L>  /  Alb  2.3<L>  /  TBili  0.6  /  DBili  0.2  /  AST  67<H>  /  ALT  54  /  AlkPhos  177<H>  05-26      CAPILLARY BLOOD GLUCOSE          Vital Signs Last 24 Hrs  T(C): 37.4 (27 May 2024 11:19), Max: 37.7 (26 May 2024 20:48)  T(F): 99.4 (27 May 2024 11:19), Max: 99.8 (26 May 2024 20:48)  HR: 78 (27 May 2024 11:19) (62 - 78)  BP: 163/61 (27 May 2024 11:19) (113/60 - 163/61)  BP(mean): --  RR: 18 (27 May 2024 11:19) (18 - 19)  SpO2: 91% (27 May 2024 11:19) (91% - 98%)    Parameters below as of 27 May 2024 11:19  Patient On (Oxygen Delivery Method): room air        Urinalysis Basic - ( 27 May 2024 07:06 )    Color: x / Appearance: x / SG: x / pH: x  Gluc: 129 mg/dL / Ketone: x  / Bili: x / Urobili: x   Blood: x / Protein: x / Nitrite: x   Leuk Esterase: x / RBC: x / WBC x   Sq Epi: x / Non Sq Epi: x / Bacteria: x        Sq Epi: x / Non Sq Epi: x / Bacteria: x        PT/INR - ( 25 May 2024 07:27 )   PT: 12.1 sec;   INR: 1.04 ratio           PHYSICAL EXAM:    Constitutional: NAD  HEENT: conjunctive   clear   Neck:  No JVD  Respiratory: CTAB  Cardiovascular: S1 and S2  Gastrointestinal: BS+, soft, NT/ND  Extremities: No peripheral edema  
  Patient is a 78y Male whom presented to the hospital with mally     PAST MEDICAL & SURGICAL HISTORY:  JILLIAN on CPAP      Myocardial infarction  2002,2006      BPH (benign prostatic hyperplasia)      Memory loss      Other secondary osteoarthritis of left knee      CAD (coronary artery disease)      Hypothyroid      Essential hypertension      HLD (hyperlipidemia)      Dementia      PAD (peripheral artery disease)      HTN (hypertension)      HLD (hyperlipidemia)      S/P arthroscopy of left knee  2014      Coronary angioplasty status  PCI with stents 2002 LAD,  2006 x 1      Status post arterial stent      History of left knee replacement          MEDICATIONS  (STANDING):  aspirin  chewable 81 milliGRAM(s) Oral daily  atorvastatin 80 milliGRAM(s) Oral at bedtime  busPIRone 5 milliGRAM(s) Oral three times a day  clopidogrel Tablet 75 milliGRAM(s) Oral daily  escitalopram 7.5 milliGRAM(s) Oral daily  heparin   Injectable 5000 Unit(s) SubCutaneous every 12 hours  lactobacillus acidophilus 1 Tablet(s) Oral every 12 hours  levothyroxine 75 MICROGram(s) Oral daily  melatonin 5 milliGRAM(s) Oral at bedtime  memantine 10 milliGRAM(s) Oral two times a day  mesalamine Suppository 1000 milliGRAM(s) Rectal at bedtime  pantoprazole    Tablet 40 milliGRAM(s) Oral before breakfast  polyethylene glycol 3350 17 Gram(s) Oral two times a day  senna 2 Tablet(s) Oral at bedtime  sodium chloride 0.9%. 1000 milliLiter(s) (50 mL/Hr) IV Continuous <Continuous>  tamsulosin 0.4 milliGRAM(s) Oral at bedtime      Allergies    penicillin (Hives; Urticaria)  penicillin (Swelling; Rash; Hives)    Intolerances        SOCIAL HISTORY:  Denies ETOh,Smoking,     FAMILY HISTORY:  Family history of lung cancer (Mother)  mother          REVIEW OF SYSTEMS:    unable to obtained a good review system                            11.3   12.62 )-----------( 94       ( 25 May 2024 07:27 )             33.7       CBC Full  -  ( 25 May 2024 07:27 )  WBC Count : 12.62 K/uL  RBC Count : 3.61 M/uL  Hemoglobin : 11.3 g/dL  Hematocrit : 33.7 %  Platelet Count - Automated : 94 K/uL  Mean Cell Volume : 93.4 fl  Mean Cell Hemoglobin : 31.3 pg  Mean Cell Hemoglobin Concentration : 33.5 gm/dL  Auto Neutrophil # : 10.25 K/uL  Auto Lymphocyte # : 1.39 K/uL  Auto Monocyte # : 0.77 K/uL  Auto Eosinophil # : 0.13 K/uL  Auto Basophil # : 0.04 K/uL  Auto Neutrophil % : 81.3 %  Auto Lymphocyte % : 11.0 %  Auto Monocyte % : 6.1 %  Auto Eosinophil % : 1.0 %  Auto Basophil % : 0.3 %      05-25    145  |  115<H>  |  33<H>  ----------------------------<  86  3.7   |  26  |  1.10    Ca    8.3<L>      25 May 2024 07:27    TPro  5.5<L>  /  Alb  2.2<L>  /  TBili  0.6  /  DBili  x   /  AST  66<H>  /  ALT  46  /  AlkPhos  149<H>  05-25      CAPILLARY BLOOD GLUCOSE          Vital Signs Last 24 Hrs  T(C): 37 (25 May 2024 12:17), Max: 37.8 (24 May 2024 18:48)  T(F): 98.6 (25 May 2024 12:17), Max: 100.1 (24 May 2024 18:48)  HR: 71 (25 May 2024 12:17) (71 - 100)  BP: 119/- (25 May 2024 12:17) (109/65 - 119/-)  BP(mean): --  RR: 18 (25 May 2024 12:17) (18 - 18)  SpO2: 92% (25 May 2024 12:17) (91% - 96%)    Parameters below as of 25 May 2024 12:17  Patient On (Oxygen Delivery Method): room air        Urinalysis Basic - ( 25 May 2024 07:27 )    Color: x / Appearance: x / SG: x / pH: x  Gluc: 86 mg/dL / Ketone: x  / Bili: x / Urobili: x   Blood: x / Protein: x / Nitrite: x   Leuk Esterase: x / RBC: x / WBC x   Sq Epi: x / Non Sq Epi: x / Bacteria: x        PT/INR - ( 25 May 2024 07:27 )   PT: 12.1 sec;   INR: 1.04 ratio         PTT - ( 24 May 2024 10:30 )  PTT:27.8 sec      PHYSICAL EXAM:    Constitutional: NAD  HEENT: conjunctive   clear   Neck:  No JVD  Respiratory: CTAB  Cardiovascular: S1 and S2  Gastrointestinal: BS+, soft, NT/ND  Extremities: No peripheral edema  
Date of Service: 05-25-24 @ 11:30    Patient is a 78y old  Male who presents with a chief complaint of     INTERVAL HPI/OVERNIGHT EVENTS: Patient seen and examined. NAD. No complaints.    Vital Signs Last 24 Hrs  T(C): 36.8 (25 May 2024 05:50), Max: 37.8 (24 May 2024 18:48)  T(F): 98.2 (25 May 2024 05:50), Max: 100.1 (24 May 2024 18:48)  HR: 81 (25 May 2024 05:50) (74 - 109)  BP: 110/65 (25 May 2024 05:50) (109/65 - 149/78)  BP(mean): --  RR: 18 (25 May 2024 05:50) (18 - 18)  SpO2: 91% (25 May 2024 05:50) (91% - 96%)    Parameters below as of 25 May 2024 05:50  Patient On (Oxygen Delivery Method): room air        05-25    145  |  115<H>  |  33<H>  ----------------------------<  86  3.7   |  26  |  1.10    Ca    8.3<L>      25 May 2024 07:27    TPro  5.5<L>  /  Alb  2.2<L>  /  TBili  0.6  /  DBili  x   /  AST  66<H>  /  ALT  46  /  AlkPhos  149<H>  05-25                          11.3   12.62 )-----------( 94       ( 25 May 2024 07:27 )             33.7     PT/INR - ( 25 May 2024 07:27 )   PT: 12.1 sec;   INR: 1.04 ratio         PTT - ( 24 May 2024 10:30 )  PTT:27.8 sec  CAPILLARY BLOOD GLUCOSE        Urinalysis Basic - ( 25 May 2024 07:27 )    Color: x / Appearance: x / SG: x / pH: x  Gluc: 86 mg/dL / Ketone: x  / Bili: x / Urobili: x   Blood: x / Protein: x / Nitrite: x   Leuk Esterase: x / RBC: x / WBC x   Sq Epi: x / Non Sq Epi: x / Bacteria: x              acetaminophen     Tablet .. 650 milliGRAM(s) Oral every 6 hours PRN  aluminum hydroxide/magnesium hydroxide/simethicone Suspension 30 milliLiter(s) Oral every 4 hours PRN  aspirin  chewable 81 milliGRAM(s) Oral daily  atorvastatin 80 milliGRAM(s) Oral at bedtime  bisacodyl Suppository 10 milliGRAM(s) Rectal daily PRN  busPIRone 5 milliGRAM(s) Oral three times a day  clopidogrel Tablet 75 milliGRAM(s) Oral daily  ertapenem  IVPB 1000 milliGRAM(s) IV Intermittent every 24 hours  escitalopram 7.5 milliGRAM(s) Oral daily  heparin   Injectable 5000 Unit(s) SubCutaneous every 12 hours  lactobacillus acidophilus 1 Tablet(s) Oral every 12 hours  levothyroxine 75 MICROGram(s) Oral daily  magnesium hydroxide Suspension 30 milliLiter(s) Oral daily PRN  melatonin 5 milliGRAM(s) Oral at bedtime  memantine 10 milliGRAM(s) Oral two times a day  mesalamine Suppository 1000 milliGRAM(s) Rectal at bedtime  ondansetron Injectable 4 milliGRAM(s) IV Push every 8 hours PRN  pantoprazole    Tablet 40 milliGRAM(s) Oral before breakfast  polyethylene glycol 3350 17 Gram(s) Oral two times a day  senna 2 Tablet(s) Oral at bedtime  sodium chloride 0.9%. 1000 milliLiter(s) IV Continuous <Continuous>  tamsulosin 0.4 milliGRAM(s) Oral at bedtime              REVIEW OF SYSTEMS:  CONSTITUTIONAL: No fever, no weight loss, or no fatigue  NECK: No pain, no stiffness  RESPIRATORY: No cough, no wheezing, no chills, no hemoptysis, No shortness of breath  CARDIOVASCULAR: No chest pain, no palpitations, no dizziness, no leg swelling  GASTROINTESTINAL: No abdominal pain. No nausea, no vomiting, no hematemesis; No diarrhea, no constipation. No melena, no hematochezia.  GENITOURINARY: No dysuria, no frequency, no hematuria, no incontinence  NEUROLOGICAL: No headaches, no loss of strength, no numbness, no tremors  SKIN: No itching, no burning  MUSCULOSKELETAL: No joint pain, no swelling; No muscle, no back, no extremity pain  PSYCHIATRIC: No depression, no mood swings,   HEME/LYMPH: No easy bruising, no bleeding gums  ALLERY AND IMMUNOLOGIC: No hives       Consultant(s) Notes Reviewed:  [X] YES  [ ] NO    PHYSICAL EXAM:  GENERAL: NAD  HEAD:  Atraumatic, Normocephalic  EYES: EOMI, PERRLA, conjunctiva and sclera clear  ENMT: No tonsillar erythema, exudates, or enlargement; Moist mucous membranes  NECK: Supple, No JVD  NERVOUS SYSTEM:  Awake & alert  CHEST/LUNG: Clear to auscultation bilaterally; No rales, rhonchi, wheezing,  HEART: Regular rate and rhythm  ABDOMEN: Soft, Nontender, Nondistended; Bowel sounds present  EXTREMITIES:  No clubbing, cyanosis, or edema  LYMPH: No lymphadenopathy noted  SKIN: No rashes      Advanced care planning discussed with patient/family [X] YES   [ ] NO    Advanced care planning discussed with patient/family. Patient's health status was discussed. All appropriate changes have been made regarding patient's end-of-life care. Advanced care planning forms reviewed/discussed/completed.  20 minutes spent.   
Date of Service: 05-27-24 @ 12:31    Patient is a 78y old  Male who presents with a chief complaint of UTI (26 May 2024 11:16)      INTERVAL HPI/OVERNIGHT EVENTS: Patient seen and examined. NAD. No complaints.    Vital Signs Last 24 Hrs  T(C): 37.4 (27 May 2024 11:19), Max: 37.7 (26 May 2024 20:48)  T(F): 99.4 (27 May 2024 11:19), Max: 99.8 (26 May 2024 20:48)  HR: 78 (27 May 2024 11:19) (62 - 78)  BP: 163/61 (27 May 2024 11:19) (113/60 - 163/61)  BP(mean): --  RR: 18 (27 May 2024 11:19) (18 - 18)  SpO2: 91% (27 May 2024 11:19) (91% - 97%)    Parameters below as of 27 May 2024 11:19  Patient On (Oxygen Delivery Method): room air        05-27    147<H>  |  115<H>  |  23  ----------------------------<  129<H>  3.7   |  28  |  0.99    Ca    8.4<L>      27 May 2024 07:06  Mg     2.1     05-27    TPro  5.8<L>  /  Alb  2.3<L>  /  TBili  0.6  /  DBili  0.2  /  AST  67<H>  /  ALT  54  /  AlkPhos  177<H>  05-26                          11.5   7.24  )-----------( 109      ( 27 May 2024 07:06 )             34.8       CAPILLARY BLOOD GLUCOSE        Urinalysis Basic - ( 27 May 2024 07:06 )    Color: x / Appearance: x / SG: x / pH: x  Gluc: 129 mg/dL / Ketone: x  / Bili: x / Urobili: x   Blood: x / Protein: x / Nitrite: x   Leuk Esterase: x / RBC: x / WBC x   Sq Epi: x / Non Sq Epi: x / Bacteria: x    Culture - Urine (05.24.24 @ 12:20)   Specimen Source: Catheterized Catheterized  Culture Results:   10,000 - 49,000 CFU/mL Escherichia coli      Historical Values  Culture - Urine (05.24.24 @ 12:20)   Specimen Source: Catheterized Catheterized  Culture Results:   10,000 - 49,000 CFU/mL Escherichia coli          acetaminophen     Tablet .. 650 milliGRAM(s) Oral every 6 hours PRN  aluminum hydroxide/magnesium hydroxide/simethicone Suspension 30 milliLiter(s) Oral every 4 hours PRN  aspirin  chewable 81 milliGRAM(s) Oral daily  atorvastatin 80 milliGRAM(s) Oral at bedtime  bisacodyl Suppository 10 milliGRAM(s) Rectal daily PRN  busPIRone 5 milliGRAM(s) Oral three times a day  clopidogrel Tablet 75 milliGRAM(s) Oral daily  dextrose 5%. 1000 milliLiter(s) IV Continuous <Continuous>  escitalopram 7.5 milliGRAM(s) Oral daily  heparin   Injectable 5000 Unit(s) SubCutaneous every 12 hours  lactobacillus acidophilus 1 Tablet(s) Oral every 12 hours  levothyroxine 75 MICROGram(s) Oral daily  magnesium hydroxide Suspension 30 milliLiter(s) Oral daily PRN  melatonin 5 milliGRAM(s) Oral at bedtime  memantine 10 milliGRAM(s) Oral two times a day  mesalamine Suppository 1000 milliGRAM(s) Rectal at bedtime  ondansetron Injectable 4 milliGRAM(s) IV Push every 8 hours PRN  pantoprazole    Tablet 40 milliGRAM(s) Oral before breakfast  polyethylene glycol 3350 17 Gram(s) Oral two times a day  senna 2 Tablet(s) Oral at bedtime  tamsulosin 0.4 milliGRAM(s) Oral at bedtime              REVIEW OF SYSTEMS:  CONSTITUTIONAL: No fever, no weight loss, or no fatigue  NECK: No pain, no stiffness  RESPIRATORY: No cough, no wheezing, no chills, no hemoptysis, No shortness of breath  CARDIOVASCULAR: No chest pain, no palpitations, no dizziness, no leg swelling  GASTROINTESTINAL: No abdominal pain. No nausea, no vomiting, no hematemesis; No diarrhea, no constipation. No melena, no hematochezia.  GENITOURINARY: No dysuria, no frequency, no hematuria, no incontinence  NEUROLOGICAL: No headaches, no loss of strength, no numbness, no tremors  SKIN: No itching, no burning  MUSCULOSKELETAL: No joint pain, no swelling; No muscle, no back, no extremity pain  PSYCHIATRIC: No depression, no mood swings,   HEME/LYMPH: No easy bruising, no bleeding gums  ALLERY AND IMMUNOLOGIC: No hives       Consultant(s) Notes Reviewed:  [X] YES  [ ] NO    PHYSICAL EXAM:  GENERAL: NAD  HEAD:  Atraumatic, Normocephalic  EYES: EOMI, PERRLA, conjunctiva and sclera clear  ENMT: No tonsillar erythema, exudates, or enlargement; Moist mucous membranes  NECK: Supple, No JVD  NERVOUS SYSTEM:  Awake & alert  CHEST/LUNG: Clear to auscultation bilaterally; No rales, rhonchi, wheezing,  HEART: Regular rate and rhythm  ABDOMEN: Soft, Nontender, Nondistended; Bowel sounds present  EXTREMITIES:  No clubbing, cyanosis, or edema  LYMPH: No lymphadenopathy noted  SKIN: No rashes      Advanced care planning discussed with patient/family [X] YES   [ ] NO    Advanced care planning discussed with patient/family. Patient's health status was discussed. All appropriate changes have been made regarding patient's end-of-life care. Advanced care planning forms reviewed/discussed/completed.  20 minutes spent.   
Date of Service: 05-26-24 @ 11:16    Patient is a 78y old  Male who presents with a chief complaint of Leukocytosis     (25 May 2024 12:23)      INTERVAL HPI/OVERNIGHT EVENTS: Patient seen and examined. NAD. No complaints.    Vital Signs Last 24 Hrs  T(C): 36.9 (26 May 2024 04:09), Max: 37 (25 May 2024 12:17)  T(F): 98.5 (26 May 2024 04:09), Max: 98.6 (25 May 2024 12:17)  HR: 74 (26 May 2024 07:32) (70 - 74)  BP: 148/76 (26 May 2024 04:09) (119/68 - 148/76)  BP(mean): --  RR: 18 (26 May 2024 04:09) (18 - 18)  SpO2: 97% (26 May 2024 07:32) (92% - 97%)    Parameters below as of 26 May 2024 04:09  Patient On (Oxygen Delivery Method): BiPAP/CPAP        05-26    146<H>  |  114<H>  |  28<H>  ----------------------------<  114<H>  3.7   |  27  |  0.96    Ca    8.7      26 May 2024 08:52  Mg     2.1     05-26    TPro  5.5<L>  /  Alb  2.2<L>  /  TBili  0.6  /  DBili  x   /  AST  66<H>  /  ALT  46  /  AlkPhos  149<H>  05-25                          11.6   7.95  )-----------( 95       ( 26 May 2024 08:52 )             34.5     PT/INR - ( 25 May 2024 07:27 )   PT: 12.1 sec;   INR: 1.04 ratio           CAPILLARY BLOOD GLUCOSE        Urinalysis Basic - ( 26 May 2024 08:52 )    Color: x / Appearance: x / SG: x / pH: x  Gluc: 114 mg/dL / Ketone: x  / Bili: x / Urobili: x   Blood: x / Protein: x / Nitrite: x   Leuk Esterase: x / RBC: x / WBC x   Sq Epi: x / Non Sq Epi: x / Bacteria: x      Culture - Urine (05.24.24 @ 12:20)   Specimen Source: Catheterized Catheterized  Culture Results:   10,000 - 49,000 CFU/mL Escherichia coli      Historical Values  Culture - Urine (05.24.24 @ 12:20)   Specimen Source: Catheterized Catheterized  Culture Results:   10,000 - 49,000 CFU/mL Escherichia coli  < from: US Abdomen Upper Quadrant Right (05.26.24 @ 10:41) >    ACC: [id number] EXAM:  US ABDOMEN RT UPR QUADRANT   ORDERED BY: LUCAS   BENVENUTO     PROCEDURE DATE:  05/26/2024          INTERPRETATION:  CLINICAL INFORMATION: Gallstones and distended   gallbladder CT.    COMPARISON: CT chest, abdomen, and pelvis 5/24/2024.    TECHNIQUE: Sonography of the right upper quadrant.    FINDINGS:  Technically difficult and limited exam due to limited patient cooperation.    Liver: Within normal limits.  Bile ducts: Normal caliber. Common bile duct measures 7-8 mm.  Gallbladder: Cholelithiasis and sludge. Limited visualization without   evidence for significant wall thickening or pericholecystic edema. Per   the sonographer, no focal pain was elicited over the gallbladder during   the examination.  Pancreas: Not visualized.  Right kidney: 11.5 cm. No hydronephrosis. Upper pole renal sinus cyst   measuring 3.0 x 3.0 x 2.7 cm. Upper pole cortical cyst measuring 1.6 x   1.1 cm.  Ascites: None.  IVC: Not well visualized.    IMPRESSION:  *  Technically difficultand limited exam. Cholelithiasis and gallbladder   sludge. No definite sonographic evidence for acute cholecystitis.   Consider close follow-up as warranted.  *  The common bile duct measures up to 7-8 mm in caliber, which is top   normal for the patient's age.        --- End of Report ---            KRISTA RIOJAS MD; Attending Radiologist  This document has been electronically signed. May 26 2024 11:05AM    < end of copied text >  < from: US Abdomen Upper Quadrant Right (05.26.24 @ 10:41) >    ACC: [id number] EXAM:  US ABDOMEN RT UPR QUADRANT   ORDERED BY: LUCAS   BENVENUTO     PROCEDURE DATE:  05/26/2024          INTERPRETATION:  CLINICAL INFORMATION: Gallstones and distended   gallbladder CT.    COMPARISON: CT chest, abdomen, and pelvis 5/24/2024.    TECHNIQUE: Sonography of the right upper quadrant.    FINDINGS:  Technically difficult and limited exam due to limited patient cooperation.    Liver: Within normal limits.  Bile ducts: Normal caliber. Common bile duct measures 7-8 mm.  Gallbladder: Cholelithiasis and sludge. Limited visualization without   evidence for significant wall thickening or pericholecystic edema. Per   the sonographer, no focal pain was elicited over the gallbladder during   the examination.  Pancreas: Not visualized.  Right kidney: 11.5 cm. No hydronephrosis. Upper pole renal sinus cyst   measuring 3.0 x 3.0 x 2.7 cm. Upper pole cortical cyst measuring 1.6 x   1.1 cm.  Ascites: None.  IVC: Not well visualized.    IMPRESSION:  *  Technically difficultand limited exam. Cholelithiasis and gallbladder   sludge. No definite sonographic evidence for acute cholecystitis.   Consider close follow-up as warranted.  *  The common bile duct measures up to 7-8 mm in caliber, which is top   normal for the patient's age.        --- End of Report ---            KRISTA RIOJAS MD; Attending Radiologist  This document has been electronically signed. May 26 2024 11:05AM    < end of copied text >        acetaminophen     Tablet .. 650 milliGRAM(s) Oral every 6 hours PRN  aluminum hydroxide/magnesium hydroxide/simethicone Suspension 30 milliLiter(s) Oral every 4 hours PRN  aspirin  chewable 81 milliGRAM(s) Oral daily  atorvastatin 80 milliGRAM(s) Oral at bedtime  bisacodyl Suppository 10 milliGRAM(s) Rectal daily PRN  busPIRone 5 milliGRAM(s) Oral three times a day  clopidogrel Tablet 75 milliGRAM(s) Oral daily  ertapenem  IVPB 1000 milliGRAM(s) IV Intermittent every 24 hours  escitalopram 7.5 milliGRAM(s) Oral daily  heparin   Injectable 5000 Unit(s) SubCutaneous every 12 hours  lactobacillus acidophilus 1 Tablet(s) Oral every 12 hours  levothyroxine 75 MICROGram(s) Oral daily  magnesium hydroxide Suspension 30 milliLiter(s) Oral daily PRN  melatonin 5 milliGRAM(s) Oral at bedtime  memantine 10 milliGRAM(s) Oral two times a day  mesalamine Suppository 1000 milliGRAM(s) Rectal at bedtime  ondansetron Injectable 4 milliGRAM(s) IV Push every 8 hours PRN  pantoprazole    Tablet 40 milliGRAM(s) Oral before breakfast  polyethylene glycol 3350 17 Gram(s) Oral two times a day  senna 2 Tablet(s) Oral at bedtime  tamsulosin 0.4 milliGRAM(s) Oral at bedtime              REVIEW OF SYSTEMS:  CONSTITUTIONAL: No fever, no weight loss, or no fatigue  NECK: No pain, no stiffness  RESPIRATORY: No cough, no wheezing, no chills, no hemoptysis, No shortness of breath  CARDIOVASCULAR: No chest pain, no palpitations, no dizziness, no leg swelling  GASTROINTESTINAL: No abdominal pain. No nausea, no vomiting, no hematemesis; No diarrhea, no constipation. No melena, no hematochezia.  GENITOURINARY: No dysuria, no frequency, no hematuria, no incontinence  NEUROLOGICAL: No headaches, no loss of strength, no numbness, no tremors  SKIN: No itching, no burning  MUSCULOSKELETAL: No joint pain, no swelling; No muscle, no back, no extremity pain  PSYCHIATRIC: No depression, no mood swings,   HEME/LYMPH: No easy bruising, no bleeding gums  ALLERY AND IMMUNOLOGIC: No hives       Consultant(s) Notes Reviewed:  [X] YES  [ ] NO    PHYSICAL EXAM:  GENERAL: NAD  HEAD:  Atraumatic, Normocephalic  EYES: EOMI, PERRLA, conjunctiva and sclera clear  ENMT: No tonsillar erythema, exudates, or enlargement; Moist mucous membranes  NECK: Supple, No JVD  NERVOUS SYSTEM:  lethargic  CHEST/LUNG: Clear to auscultation bilaterally; No rales, rhonchi, wheezing,  HEART: Regular rate and rhythm  ABDOMEN: Soft, Nontender, Nondistended; Bowel sounds present  EXTREMITIES:  No clubbing, cyanosis, or edema  LYMPH: No lymphadenopathy noted  SKIN: No rashes      Advanced care planning discussed with patient/family [X] YES   [ ] NO    Advanced care planning discussed with patient/family. Patient's health status was discussed. All appropriate changes have been made regarding patient's end-of-life care. Advanced care planning forms reviewed/discussed/completed.  20 minutes spent.

## 2024-05-28 NOTE — PROGRESS NOTE ADULT - ASSESSMENT
REASON FOR VISIT  .. Management of problems listed below      ROS  . Patient unable to give ROS     PHYSICAL EXAM    HEENT Unremarkable  atraumatic   RESP Fair air entry  Harsh breath sound   CARDIAC S1 S2 No S3     NO JVD    ABDOMEN No hepatosplenomegaly   PEDAL EDEMA present No calf tenderness  NO rash       GENERAL DATA .   GOC.    ..  prior   ICU STAY.    .. no   COVID.   .. 5/24/2024 scv2 (-)   BEST TRACTICE ISSUES.  . HOB ELEVATN.  .. Yes  . DVT PPLX.  5/24/2024 HPSC   . STRESS ULCER PPLX. 5/24/2024 PROTONIX 40   . DIET.  5/24/2024 NPO   . IV FLUIDS...... 5/24/2024 NS 50     ALLGY.   .. pncl      WT.   ..  5/24/2024 86  BMI.   .. 5/24/2024 27     ABGS.   .  VS/ PO/IO/ VENT/ DRIPS.  5/28/2024 afeb 61 170/80   5/28/2024 ra 92%     . CC .   . 5/24/2024 BIBA from [facility] for continuing elevated WBC d/t UTI- despite antibiotics and today noted to have increased BUN and Cr. Hx dementia, DNR/DNI with limited interventions as per facility paperwork.    PATIENT DATA.  RESP.   INFECTION.  …. Esr 5/24/2024 esr 37   …. W 5/24-5/25-5/27/2024 w 20 - 12 - 7.2   .... ua 5/24/2024 ua w 50 r 50 bact moder   .... pr 5/24-5/25/2024 pr 19-8.2    .... ct cap 5/24/2024   ........ Tr bl effsns with dependent atelectasis   ........ cholelithiasis and distended gb   ........ bladder wall thickening and perivesicular stranding   ........ small rectal fecal impaction with mild stercoral proctitis   .... rvp 5/24/2024 (-)   .... bc 5/24 (-)   .... uc 5/24 10-49 E coli  ..... 5/24/2024 ERTAPENEM X 3D   CARDIAC.  .... 5/24/2024 ASA 81   .... 5/24/2024 PLAVX 75  .... 5/24/2024 ATORVASTAT 80    HEMAT.  …. Hb 5/24-5/25/2024 Hb 12.4 - 11.3   .... Plt 5/24/2024 Plt 93   .... INR 5/24/2024 .89   GI.  .... LFTS 5/24/2024   ........   ........ AST 70  ........ ALT 38   ..... 5/24/2024 MIRALAX   .... 5/24/2024 MESCALAMIN 1000   RENAL.  .... Na 5/24-5/27/2024 Na 142 - 147   .... K 5/24/2024 K 4.1   .... CO2 5/24/2024 co2 26  .... Cr 5/24-5/25/2024 Cr 1.4-1.1   .... 5/24/2024 TAMSULOSIN .4   ENDO.  .... 5/24/2024 LEVOXYL 75   NEURO.  .... 5/24/2024 BUSPAR 5.3   .... 5/24/2024 LEXAPRO 75   .... 5/24/2024 MEMANTINE 10.2   SKIN.    . BRIEFLY, .      . 5/24/2024 79 yo male with PMH of hypothyroidism, hld, atherosclerotic heart disease, Meier's Palsy, MDD, Alzheimer's disease, BPH, and peripheral vascular disease who was brought in from [facility] for elevated WBC and BUN/Cr levels. The patient has been diagnosed with UTI the past three days and has been given Rocephin for tx which has not improved his WBC. Per patient chart, WBC and BUN/Cr levels were 22.65 and 40/2.84 on 5/22 and 23.12 and 52/2.2 on 5/23. The patient is unable to verbalize any active complaints at this time, his wife at bedside reports that he seems weaker and to be ambulating less often compared to his baseline.     . COURSE   .... 5/24/2024  Started on ertapenem for presumed uti     . VISIT HISTORY .  PAST MEDICAL HISTORY:  CAD (coronary artery disease)   HLD (hyperlipidemia)   HTN (hypertension)   Hypothyroid   Dementia  Memory loss   Myocardial infarction 2002,2006  JILLIAN on CPAP   Other secondary osteoarthritis of left knee   PAD (peripheral artery disease).   BPH (benign prostatic hyperplasia)     PAST SURGICAL HISTORY:  Coronary angioplasty status PCI with stents 2002 LAD,  2006 x 1  History of left knee replacement   S/P arthroscopy of left knee 2014  Status post arterial stent.  . SIGNIFICANT HOME MEDS.   · aspirin 81 mg oral tablet, chewable: 1 tab(s) orally once a day  · pantoprazole 40 mg oral delayed release tablet: 1 tab(s) orally once a day (before a meal)  · senna oral tablet: 2 tab(s) orally once a day (at bedtime)  · docusate sodium 100 mg oral capsule: 1 cap(s) orally 3 times a day  · aspirin 81 mg oral delayed release tablet: 1 tab(s) orally 2 times a day x 40 more days  · tamsulosin 0.4 mg oral capsule: 1 cap(s) orally once a day (at bedtime)  · oxyCODONE 10 mg oral tablet: 1 tab(s) orally every 3 hours, As needed, Moderate Pain (4 - 6)  · oxyCODONE 5 mg oral tablet: 1 tab(s) orally every 3 hours, As needed, Mild Pain (1 - 3)  · acetaminophen 500 mg oral tablet: 2 tab(s) orally every 12 hours  · Rapaflo 8 mg oral capsule: 1 cap(s) orally once a day (at bedtime)  · memantine 10 mg oral tablet: 1 tab(s) orally once a day  · rosuvastatin 20 mg oral capsule: 1 tab(s) orally once a day  · Detrol: 4 milligram(s) orally once a day  · Aricept 10 mg oral tablet: 1 tab(s) orally once a day (at bedtime)  · Multi Vitamin+: 1 tab(s) orally  · Fish Oil oral capsule: 1 tab(s) orally once a day  · Vitamin B12 50 mcg oral tablet: 1 tab(s) orally once a day  · atenolol 25 mg oral tablet: 1 tab(s) orally once a day  · memantine 5 mg oral tablet: 1 tab(s) orally once a day (at bedtime)  · memantine 10 mg oral tablet: orally 2 times a day  · Crestor 20 mg oral tablet: 1 tab(s) orally once a day (at bedtime)  · escitalopram 5 mg oral tablet: 0.5 tab(s) orally once a day (in the morning)  · Plavix 75 mg oral tablet: 1 tab(s) orally once a day  · clopidogrel 75 mg oral tablet: 1 tab(s) orally once a day  · Fleet Enema 19 g-7 g rectal enema: 133 milliliter(s) rectally once a day as needed for  constipation  · tamsulosin 0.4 mg oral capsule: 1 cap(s) orally once a day  · bisacodyl 10 mg rectal suppository: 1 suppository(ies) rectally once a day as needed for  constipation  · levothyroxine 75 mcg (0.075 mg) oral tablet: 1 tab(s) orally once a day  · levothyroxine 75 mcg (0.075 mg) oral tablet: 1 tab(s) orally once a day    . DEVICES/TUBES/DECUBS/PROCEDURES.    . PROBLEM/PLAN.    . SEPSIS 5/24/2024   . UTI 5/24/2024   . MILD STERCORAL PROCTITIS 5/24/2024 CT   …. Esr 5/24/2024 esr 37   …. W 5/24/2024 w 20   .... ua 5/24/2024 ua w 50 r 50 bact moder   .... pr 5/24/2024 pr 19   .... ct cap 5/24/2024   ........ Tr bl effsns with dependent atelectasis   ........ cholelithiasis and distended gb   ........ bladder wall thickening and perivesicular stranding   ........ small rectal fecal impaction with mild stercoral proctitis   .... rvp 5/24/2024 (-)   ..... 5/24/2024 ERTAPENEM X 3D     . ATELECTASIS 5/24/2024 CT     . CAD.   .... 5/24/2024 ASA 81   .... 5/24/2024 PLAVX 75     . THROMBOCYTOPENIA 5/24/2024 [PLT 93   .... monitor     . RECTAL FECAL IMPACTN 5/24/2024 CT   .... on laxativ    . ELEVATED LFTS 5/24/2024 ALT 70   .... monitor     . CAROLA 5/24/2024 CR 1.4  ..... Fluids monitor     . HYPOTHYROID.  . ALZHEIMERS     . CURRENT ISSUES.  . SEPSIS 5/24/2024   . UTI 5/24/2024 10-49 E coli   . MILD STERCORAL PROCTITIS 5/24/2024 CT   . ATELECTASIS 5/24/2024 CT   . TRACE BILATERAL PLEURAL EFFUSNS 5/24/2024 CT   . CAD.   . THROMBOCYTOPENIA 5/24/2024 [PLT 93   . RECTAL FECAL IMPACTN 5/24/2024 CT   . ELEVATED LFTS 5/24/2024 ALT 70   . CHOLELITHIASIS AND DISTENDED GALLBLADDER 5/24/2024 CT -5/26 US (-)   . CAROLA 5/24-5/25/2024 CR 1.4-1.1  . HYPOTHYROID.  . ALZHEIMERS   . PENICILLIN ALLERGY     . DISPOSN.  .... On 5/24 3 d course ertapenem staretd for UTI 5/24 10-49 E coli   .... Cholecystitis not seen on 5/26 US       TIME SPENT.  . Over 36 minutes aggregate care time spent on encounter; activities included   direct patient care, counseling and/or coordinating care reviewing notes, lab data/ imaging , discussion with multidisciplinary team/ patient  /family and explaining in detail risks, benefits, alternatives  of the recommendations     PATIENT.  . MASTER CARLITOS 78 m 5/24/2024 1947 DR SINA ELY

## 2024-05-28 NOTE — PROGRESS NOTE ADULT - NUTRITIONAL ASSESSMENT
MEDICATIONS  (STANDING):  aspirin  chewable 81 milliGRAM(s) Oral daily  atorvastatin 80 milliGRAM(s) Oral at bedtime  busPIRone 5 milliGRAM(s) Oral three times a day  clopidogrel Tablet 75 milliGRAM(s) Oral daily  ertapenem  IVPB 1000 milliGRAM(s) IV Intermittent every 24 hours  escitalopram 7.5 milliGRAM(s) Oral daily  heparin   Injectable 5000 Unit(s) SubCutaneous every 12 hours  lactobacillus acidophilus 1 Tablet(s) Oral every 12 hours  levothyroxine 75 MICROGram(s) Oral daily  melatonin 5 milliGRAM(s) Oral at bedtime  memantine 10 milliGRAM(s) Oral two times a day  mesalamine Suppository 1000 milliGRAM(s) Rectal at bedtime  pantoprazole    Tablet 40 milliGRAM(s) Oral before breakfast  polyethylene glycol 3350 17 Gram(s) Oral two times a day  senna 2 Tablet(s) Oral at bedtime  sodium chloride 0.9%. 1000 milliLiter(s) (50 mL/Hr) IV Continuous <Continuous>  tamsulosin 0.4 milliGRAM(s) Oral at bedtime
MEDICATIONS  (STANDING):  aspirin  chewable 81 milliGRAM(s) Oral daily  atorvastatin 80 milliGRAM(s) Oral at bedtime  busPIRone 5 milliGRAM(s) Oral three times a day  clopidogrel Tablet 75 milliGRAM(s) Oral daily  ertapenem  IVPB 1000 milliGRAM(s) IV Intermittent every 24 hours  escitalopram 7.5 milliGRAM(s) Oral daily  heparin   Injectable 5000 Unit(s) SubCutaneous every 12 hours  lactobacillus acidophilus 1 Tablet(s) Oral every 12 hours  levothyroxine 75 MICROGram(s) Oral daily  melatonin 5 milliGRAM(s) Oral at bedtime  memantine 10 milliGRAM(s) Oral two times a day  mesalamine Suppository 1000 milliGRAM(s) Rectal at bedtime  pantoprazole    Tablet 40 milliGRAM(s) Oral before breakfast  polyethylene glycol 3350 17 Gram(s) Oral two times a day  senna 2 Tablet(s) Oral at bedtime  sodium chloride 0.9%. 1000 milliLiter(s) (50 mL/Hr) IV Continuous <Continuous>  tamsulosin 0.4 milliGRAM(s) Oral at bedtime
MEDICATIONS  (STANDING):  aspirin  chewable 81 milliGRAM(s) Oral daily  atorvastatin 80 milliGRAM(s) Oral at bedtime  busPIRone 5 milliGRAM(s) Oral three times a day  cefpodoxime 200 milliGRAM(s) Oral every 12 hours  clopidogrel Tablet 75 milliGRAM(s) Oral daily  dextrose 5%. 1000 milliLiter(s) (40 mL/Hr) IV Continuous <Continuous>  escitalopram 7.5 milliGRAM(s) Oral daily  heparin   Injectable 5000 Unit(s) SubCutaneous every 12 hours  lactobacillus acidophilus 1 Tablet(s) Oral every 12 hours  levothyroxine 75 MICROGram(s) Oral daily  melatonin 5 milliGRAM(s) Oral at bedtime  memantine 10 milliGRAM(s) Oral two times a day  mesalamine Suppository 1000 milliGRAM(s) Rectal at bedtime  pantoprazole    Tablet 40 milliGRAM(s) Oral before breakfast  polyethylene glycol 3350 17 Gram(s) Oral two times a day  senna 2 Tablet(s) Oral at bedtime  tamsulosin 0.4 milliGRAM(s) Oral at bedtime
MEDICATIONS  (STANDING):  aspirin  chewable 81 milliGRAM(s) Oral daily  atorvastatin 80 milliGRAM(s) Oral at bedtime  busPIRone 5 milliGRAM(s) Oral three times a day  clopidogrel Tablet 75 milliGRAM(s) Oral daily  ertapenem  IVPB 1000 milliGRAM(s) IV Intermittent every 24 hours  escitalopram 7.5 milliGRAM(s) Oral daily  heparin   Injectable 5000 Unit(s) SubCutaneous every 12 hours  lactobacillus acidophilus 1 Tablet(s) Oral every 12 hours  levothyroxine 75 MICROGram(s) Oral daily  melatonin 5 milliGRAM(s) Oral at bedtime  memantine 10 milliGRAM(s) Oral two times a day  mesalamine Suppository 1000 milliGRAM(s) Rectal at bedtime  pantoprazole    Tablet 40 milliGRAM(s) Oral before breakfast  polyethylene glycol 3350 17 Gram(s) Oral two times a day  senna 2 Tablet(s) Oral at bedtime  sodium chloride 0.9%. 1000 milliLiter(s) (50 mL/Hr) IV Continuous <Continuous>  tamsulosin 0.4 milliGRAM(s) Oral at bedtime

## 2024-05-28 NOTE — DISCHARGE NOTE PROVIDER - HOSPITAL COURSE
Chi Deleon is a 79 yo male with PMH of hypothyroidism, hld, atherosclerotic heart disease, Meier's Palsy, MDD, Alzheimer's disease, BPH, and peripheral vascular disease who was brought in from [facility] for elevated WBC and BUN/Cr levels. The patient has been diagnosed with UTI the past three days and has been given Rocephin for tx which has not improved his WBC. Per patient chart, WBC and BUN/Cr levels were 22.65 and 40/2.84 on 5/22 and 23.12 and 52/2.2 on 5/23. The patient is unable to verbalize any active complaints at this time, his wife at bedside reports that he seems weaker and to be ambulating less often compared to his baseline.    Patient treated for UTI with iv abx  Had metabolic encephalopathy 2/2 to UTI  Also treated with IVF for hypernatremia    >35 minutes spent on discharge

## 2024-05-28 NOTE — CARE COORDINATION ASSESSMENT. - NSPASTMEDSURGHISTORY_GEN_ALL_CORE_FT
PAST MEDICAL & SURGICAL HISTORY:  HLD (hyperlipidemia)      Essential hypertension      Hypothyroid      CAD (coronary artery disease)      Other secondary osteoarthritis of left knee      Memory loss      BPH (benign prostatic hyperplasia)      Myocardial infarction  2002,2006      JILLIAN on CPAP      Coronary angioplasty status  PCI with stents 2002 LAD,  2006 x 1      S/P arthroscopy of left knee  2014      HLD (hyperlipidemia)      HTN (hypertension)      PAD (peripheral artery disease)      Dementia      History of left knee replacement      Status post arterial stent

## 2024-05-28 NOTE — CARE COORDINATION ASSESSMENT. - NSCAREPROVIDERS_GEN_ALL_CORE_FT
CARE PROVIDERS:  Accepting Physician: Marian Moreau  Administration: Asif Matson  Administration: Rigoberto Cardoso  Admitting: Marian Moreau  Attending: Marian Moreau  Case Management: Jarod Lozano  Consultant: Rowdy Davison  Consultant: Axel Comer  Consultant: Shanta Eubanks  Consultant: Akua Hernandez  Covering Team: Angel Luis Washburn ED Attending: Janak Marino  ED Nurse: Akua Bedolla  Nurse: Sarina Mayer  Nurse: Pilar Dwyer  Outpatient Provider: Pahlavan, Mohsen  Outpatient Provider: Eric Colunga  PCA/Nursing Assistant: Do Cheng  Primary Team: Ida Taylor  Registered Dietitian: Aggie Arrieta  : Connie Panchal  : Kymberly Fair  UR// Supp. Assoc.: Lior Herbert  UR// Supp. Assoc.: Sara Padgett

## 2024-05-28 NOTE — DISCHARGE NOTE PROVIDER - CARE PROVIDER_API CALL
Marian Moreau  Internal Medicine  [address], [address]  [place] [address]  Phone: [phone]  Fax: [phone]  Established Patient  Follow Up Time: 1 week   no Independent in bed mobility- supine<>sit, rolling side<>side observing proper body mechanics, proper positioning, body alignment and precautions.

## 2024-05-28 NOTE — DISCHARGE NOTE NURSING/CASE MANAGEMENT/SOCIAL WORK - PATIENT PORTAL LINK FT
You can access the FollowMyHealth Patient Portal offered by [facility] by registering at the following website: [email or web address]. By joining [facility]’s FollowMyHealth portal, you will also be able to view your health information using other applications (apps) compatible with our system.

## 2024-05-28 NOTE — DISCHARGE NOTE NURSING/CASE MANAGEMENT/SOCIAL WORK - NSDCVIVACCINE_GEN_ALL_CORE_FT
Tdap; 12-Sep-2023 21:30; Lamont Stearns (RN); [employer]; [id number] (Exp. Date: 06-Jun-2025); IntraMuscular; Deltoid Right.; 0.5 milliLiter(s); VIS (VIS Published: 09-May-2013, VIS Presented: 12-Sep-2023);

## 2024-05-28 NOTE — PROGRESS NOTE ADULT - ASSESSMENT
79 yo male with PMH of hypothyroidism, hld, atherosclerotic heart disease, Meier's Palsy, MDD, Alzheimer's disease, BPH, and peripheral vascular disease, who was brought in from [facility] for elevated WBC and BUN/Cr levels. Recently diagnosed with UTI the past three days and has been given Rocephin for tx which has not improved his WBC.    CT showed evidence of UTI as well as cholelithiasis with distended gallbladder. RUQ US showed no definite evidence of acute cholecystitis. Remains afebrile and without leukocytosis. Urine culture growing E. coli, sensitivities reviewed. Blood cultures remain no growth.    #Leukocytosis  #UTI  #Cholelithiasis  #Hx of penicillin allergy    -suggest cefpodoxime 200mg PO BID until 6/30 to complete 7 days  -discontinue ertapenem   -follow cultures to completion  -discussed with wife at bedside  -discussed with Dr. Washburn  -will sign off, please call ID if any further questions. Thank you.    Akua Hernandez MD  Division of Infectious Diseases   Cell [phone] between 8am and 6pm   After 6pm and weekends please call ID service at [phone].     35 minutes spent on total encounter assessing patient, examination, chart review, counseling and coordinating care by the attending physician/nurse/care manager.      79 yo male with PMH of hypothyroidism, hld, atherosclerotic heart disease, Meier's Palsy, MDD, Alzheimer's disease, BPH, and peripheral vascular disease, who was brought in from [facility] for elevated WBC and BUN/Cr levels. Recently diagnosed with UTI the past three days and has been given Rocephin for tx which has not improved his WBC.    CT showed evidence of UTI as well as cholelithiasis with distended gallbladder. RUQ US showed no definite evidence of acute cholecystitis. Remains afebrile and without leukocytosis. Urine culture growing E. coli, sensitivities reviewed. Blood cultures remain no growth.    #Leukocytosis  #UTI  #Cholelithiasis  #Hx of penicillin allergy    -suggest cefpodoxime 200mg PO BID until 5/30 to complete 7 days  -discontinue ertapenem   -follow cultures to completion  -discussed with wife at bedside  -discussed with Dr. Washburn  -will sign off, please call ID if any further questions. Thank you.    Akua Hernandez MD  Division of Infectious Diseases   Cell [phone] between 8am and 6pm   After 6pm and weekends please call ID service at [phone].     35 minutes spent on total encounter assessing patient, examination, chart review, counseling and coordinating care by the attending physician/nurse/care manager.      no

## 2024-05-28 NOTE — SOCIAL WORK PROGRESS NOTE - NSSWPROGRESSNOTE_GEN_ALL_CORE
pt stable for dc today. cynthia coordinated for 430 PM with ambuln [phone]. sw confirmed available bed with [facility]. pts wife aware and in agreement with transition today to [facility].  prescription medication

## 2024-05-28 NOTE — DISCHARGE NOTE PROVIDER - NSDCCPCAREPLAN_GEN_ALL_CORE_FT
PRINCIPAL DISCHARGE DIAGNOSIS  Diagnosis: Acute UTI  Assessment and Plan of Treatment: Finish course of antibiotics.  Follow-up with your primary care doctor within 1 week.

## 2024-05-28 NOTE — CARE COORDINATION ASSESSMENT. - OTHER PERTINENT REFERRAL INFORMATION
PT Is [facility] x1- Pts has HX of Alzheimer's Disease and uses no DME. PT is a LTC resident at [facility] on there Memory care unit. PCP and PT/OT sees Pt at facility.

## 2024-05-28 NOTE — DISCHARGE NOTE PROVIDER - NSDCMRMEDTOKEN_GEN_ALL_CORE_FT
Aquaphor topical ointment: Apply topically to affected area 2 times a day  aspirin 81 mg oral tablet, chewable: 1 tab(s) chewed once a day  Bacid oral capsule: 1 cap(s) orally 2 times a day until 5/28/24  busPIRone 5 mg oral tablet: 1 tab(s) orally 3 times a day  cefTRIAXone 1 g injection: 1 gram(s) intravenously 2 times a day for prophylaxis until 5/28/24 (started 5/23 AM)  clopidogrel 75 mg oral tablet: 1 tab(s) orally once a day  Dulcolax Laxative 10 mg rectal suppository: 1 suppository(ies) rectally as needed for  constipation  escitalopram 5 mg oral tablet: 1.5 tab(s) orally once a day  Fleet Enema 19 g-7 g rectal enema: 133 milliliter(s) rectally as needed for  constipation  levothyroxine 75 mcg (0.075 mg) oral tablet: 1 tab(s) orally once a day  melatonin 3 mg oral tablet: 2 tab(s) orally once a day (at bedtime)  memantine 10 mg oral tablet: 1 tab(s) orally 2 times a day  mesalamine 1000 mg rectal suppository: 1 suppository(ies) rectal once a day (at bedtime)  Milk of Magnesia 1200 mg/15 mL oral liquid: 30 milliliter(s) orally once a day as needed for  constipation  rosuvastatin 20 mg oral tablet: 1 tab(s) orally once a day (at bedtime)  tamsulosin 0.4 mg oral capsule: 1 cap(s) orally once a day (in the evening)  Tylenol 500 mg oral tablet: 2 tab(s) orally 3 times a day   aspirin 81 mg oral tablet, chewable: 1 tab(s) chewed once a day  Bacid oral capsule: 1 cap(s) orally 2 times a day until 5/28/24  busPIRone 5 mg oral tablet: 1 tab(s) orally 3 times a day  cefpodoxime 200 mg oral tablet: 1 tab(s) orally every 12 hours for 5 more days  clopidogrel 75 mg oral tablet: 1 tab(s) orally once a day  Dulcolax Laxative 10 mg rectal suppository: 1 suppository(ies) rectally as needed for  constipation  escitalopram 5 mg oral tablet: 1.5 tab(s) orally once a day  Fleet Enema 19 g-7 g rectal enema: 133 milliliter(s) rectally as needed for  constipation  levothyroxine 75 mcg (0.075 mg) oral tablet: 1 tab(s) orally once a day  melatonin 3 mg oral tablet: 2 tab(s) orally once a day (at bedtime)  memantine 10 mg oral tablet: 1 tab(s) orally 2 times a day  mesalamine 1000 mg rectal suppository: 1 suppository(ies) rectal once a day (at bedtime)  rosuvastatin 20 mg oral tablet: 1 tab(s) orally once a day (at bedtime)  tamsulosin 0.4 mg oral capsule: 1 cap(s) orally once a day (in the evening)  Tylenol 500 mg oral tablet: 2 tab(s) orally 3 times a day

## 2024-05-28 NOTE — PROGRESS NOTE ADULT - ASSESSMENT
Chi Deleon is a 77 yo male with PMH of hypothyroidism, hld, atherosclerotic heart disease, Meier's Palsy, MDD, Alzheimer's disease, BPH, and peripheral vascular disease who was brought in from [facility] for elevated WBC and BUN/Cr levels. The patient has been diagnosed with UTI the past three days and has been given Rocephin for tx which has not improved his WBC. Per patient chart, WBC and BUN/Cr levels were 22.65 and 40/2.84 on 5/22 and 23.12 and 52/2.2 on 5/23. The patient is unable to verbalize any active complaints at this time, his wife at bedside reports that he seems weaker and to be ambulating less often compared to his baseline. CT Chest No Cont was done and showed Trace bibasilar pleural effusions with dependent atelectasis. No focal consolidation.Cholelithiasis with distended gallbladder. Correlate with symptomatology. If warranted right upper quadrant ultrasound can be obtained.Bladder wall thickening with perivesicular stranding suggestive of cystitis.Bilateral nonspecific perinephric stranding may also be seen in the setting of UTI.No obstructive uropathy.Small rectal fecal impaction with associated mild stercoral proctitis Admitted for septic workup ,iv hydration ,iv abx       hypernatremia improved   start d5w dextrose 5%. 1000 milliLiter(s) (40 mL/Hr) IV Continuous    ACUTE RENAL FAILURE:   Serum creatinine is  improved   There is no progression . No uremic symptoms  No evidence of anemia .  Fluid status stable.  Will continue to avoid nephrotoxic drugs.  Patient remains asymptomatic.   Continue current therapy.        Admit for septic workup and ID evaluation,send blood and urine cx,serial lactate levels,monitor vitals hero hollins hydration,monitor urine output and renal profile,iv abx as per id cons      BP monitoring,continue current antihypertensive meds, low salt diet,followup with PMD in 1-2 weeks

## 2024-05-29 LAB
CULTURE RESULTS: SIGNIFICANT CHANGE UP
CULTURE RESULTS: SIGNIFICANT CHANGE UP
SPECIMEN SOURCE: SIGNIFICANT CHANGE UP
SPECIMEN SOURCE: SIGNIFICANT CHANGE UP

## 2025-01-14 NOTE — H&P PST ADULT - NS PRO AD PATIENT TYPE
Consent: The patient's consent was obtained including but not limited to risks of crusting, scabbing, blistering, scarring, darker or lighter pigmentary change, recurrence, incomplete removal and infection. Include Z78.9 (Other Specified Conditions Influencing Health Status) As An Associated Diagnosis?: No Show Topical Anesthesia Variable?: Yes Detail Level: Detailed Medical Necessity Clause: This procedure was medically necessary because the lesions that were treated were: Spray Paint Text: The liquid nitrogen was applied to the skin utilizing a spray paint frosting technique. Post-Care Instructions: I reviewed with the patient in detail post-care instructions. Patient is to wear sunprotection, and avoid picking at any of the treated lesions. Pt may apply Vaseline to crusted or scabbing areas. Medical Necessity Information: It is in your best interest to select a reason for this procedure from the list below. All of these items fulfill various CMS LCD requirements except the new and changing color options. Health Care Proxy (HCP)